# Patient Record
Sex: FEMALE | Race: WHITE
[De-identification: names, ages, dates, MRNs, and addresses within clinical notes are randomized per-mention and may not be internally consistent; named-entity substitution may affect disease eponyms.]

---

## 2018-04-04 NOTE — HP
Admitting History and Physical





- Primary Care Physician


PCP: Fred Wynn





- Admission


Chief Complaint: Left breast cancer with axillary node metastatic disease


History of Present Illness: 





42 year old premanapausal  female with strong family H/O breast and ovarian 

cancer. She had a H/O bilateral breast augmentations with implants originally 

placed  with exchange  due to ruptures. The patient noticed a density 

towards the lateral aspect of left breast around 2017. mammogram  and US 

showed suspicious left retroareolar calcifications and mutiple suspicious 

densities. She underwent left breast bx at 3:00 retroareolar region, 2:00 5 cm 

FN (intramammary LN) and axilla showing invasive ductal carcinoma  10/2017. , 

left breast 2:00and 9:00 bx  turned out to be benign.   Breast MRI showed 

exstensive enhancement left breast , right negative She is S/P neoadjuvant 

chemotherapy. 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  3/2018 repeat breast MRI 

showed right breast birad 3 residual  parenchymal enhancement, known residual 

enhancement left 3:00 retroareolar region birad 6 area of cancer , small 

residual enhancement 9:00 previously bx region, and decrease size of axillary 

node. 


History Source: Patient


Limitations to Obtaining History: No Limitations





- Past Medical History


Pulmonary: Yes: Asthma


Rheumatology: Yes: Rheumatoid Arthritis (?autoimmune dz)


Additional Past Medical History: 





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


Mansi dz





- Past Surgical History


Past Surgical History: Yes: Hernia Repair (umbilical hernia incarcerated at 15  

Bilateral breast augmentation  replaced  for ruptures bilateral 

myringotomies )





- Smoking History


Smoking history: Former smoker


Have you smoked in the past 12 months: Yes


Aproximately how many cigarettes per day: 1





- Alcohol/Substance Use


Hx Alcohol Use: Yes (socially)





Home Medications





- Allergies


Allergies/Adverse Reactions: 


 Allergies











Allergy/AdvReac Type Severity Reaction Status Date / Time


 


Penicillins Allergy   Verified 18 12:02


 


silver Allergy   Verified 18 12:03





[From Tegaderm AG Mesh]     














- Home Medications


Home Medications (free text): proair HFA inhaler





Family Disease History





- Family Disease History


Other Family History: mat 2nd cousin breast ca late 40's.  mat [place] ovarian ca.  

mat GF CRC skin ca.  mat [place] mouth and throat ca.  pat GF CRC.  mat [place] skin ca.  

mat  x4 esophageal ca.  pat [place] uterine ca cervical ca.  pat  lung ca





Physical Examination


Constitutional: Yes: Well Nourished


Breast(s): Yes: Other (obviosus bilateral augmentation with ptotic D cup breast 

, on palpitation no longer canc feel any suspicious densities in left breast or 

axilla, right breast negative)





Problem List





- Problems


(1) Breast cancer, left


Code(s): C50.912 - MALIGNANT NEOPLASM OF UNSPECIFIED SITE OF LEFT FEMALE BREAST

   


Qualifiers: 


   Breast location: upper outer quadrant of breast   Patient sex: female 





Assessment/Plan





Bilateral mastectomies with left sentenel node biopsy, lymphoscintogram 

posssible axillary node dissection, needle localization x2 of left axillary 

nodes with reconstruction

## 2018-04-10 ENCOUNTER — HOSPITAL ENCOUNTER (INPATIENT)
Dept: HOSPITAL 74 - FM/S | Age: 43
LOS: 2 days | Discharge: HOME | DRG: 580 | End: 2018-04-12
Attending: SURGERY | Admitting: SURGERY
Payer: COMMERCIAL

## 2018-04-10 VITALS — BODY MASS INDEX: 26.3 KG/M2

## 2018-04-10 DIAGNOSIS — C50.412: Primary | ICD-10-CM

## 2018-04-10 DIAGNOSIS — C77.3: ICD-10-CM

## 2018-04-10 DIAGNOSIS — Z17.0: ICD-10-CM

## 2018-04-10 PROCEDURE — 07B60ZX EXCISION OF LEFT AXILLARY LYMPHATIC, OPEN APPROACH, DIAGNOSTIC: ICD-10-PCS | Performed by: SURGERY

## 2018-04-10 PROCEDURE — 0KXF0Z2 TRANSFER RIGHT TRUNK MUSCLE WITH SKIN AND SUBCUTANEOUS TISSUE, OPEN APPROACH: ICD-10-PCS | Performed by: SURGERY

## 2018-04-10 PROCEDURE — A9541 TC99M SULFUR COLLOID: HCPCS

## 2018-04-10 PROCEDURE — 0HTV0ZZ RESECTION OF BILATERAL BREAST, OPEN APPROACH: ICD-10-PCS | Performed by: SURGERY

## 2018-04-10 PROCEDURE — 0HHV0NZ INSERTION OF TISSUE EXPANDER INTO BILATERAL BREAST, OPEN APPROACH: ICD-10-PCS | Performed by: SURGERY

## 2018-04-10 RX ADMIN — FAMOTIDINE SCH MG: 20 TABLET ORAL at 21:53

## 2018-04-10 RX ADMIN — PANTOPRAZOLE SODIUM SCH MG: 20 TABLET, DELAYED RELEASE ORAL at 21:53

## 2018-04-10 NOTE — OP
Operative Note





- Note:


Operative Date: 04/10/18


Pre-Operative Diagnosis: left breast cancer


Operation: bilateral mastectomy with left sentinel lymph node biopsy, bilateral 

breast reconstruction with alloderm/tissue expanders


Surgeon: Andres Mcgee


Assistant: Ila James


Anesthesiologist/CRNA: Rio Escobar


Anesthesia: General


Estimated Blood Loss (mls): 100


Drains & Tubes with Location: 19French blakmore drains


Fluid Volume Replaced (mls): 900


Operative Report Dictated: Yes

## 2018-04-11 LAB
DEPRECATED RDW RBC AUTO: 12.7 % (ref 11.6–15.6)
HCT VFR BLD CALC: 32.3 % (ref 32.4–45.2)
HGB BLD-MCNC: 10.9 GM/DL (ref 10.7–15.3)
MCH RBC QN AUTO: 35.1 PG (ref 25.7–33.7)
MCHC RBC AUTO-ENTMCNC: 33.9 G/DL (ref 32–36)
MCV RBC: 103.5 FL (ref 80–96)
PLATELET # BLD AUTO: 210 K/MM3 (ref 134–434)
PMV BLD: 7.3 FL (ref 7.5–11.1)
RBC # BLD AUTO: 3.12 M/MM3 (ref 3.6–5.2)
WBC # BLD AUTO: 4.9 K/MM3 (ref 4–10.8)

## 2018-04-11 RX ADMIN — FAMOTIDINE SCH: 20 TABLET ORAL at 21:57

## 2018-04-11 RX ADMIN — CLINDAMYCIN PHOSPHATE SCH MLS/HR: 300 INJECTION, SOLUTION INTRAVENOUS at 17:04

## 2018-04-11 RX ADMIN — DEXTROSE AND SODIUM CHLORIDE SCH: 5; 450 INJECTION, SOLUTION INTRAVENOUS at 08:26

## 2018-04-11 RX ADMIN — SODIUM CHLORIDE, POTASSIUM CHLORIDE, SODIUM LACTATE AND CALCIUM CHLORIDE SCH: 600; 310; 30; 20 INJECTION, SOLUTION INTRAVENOUS at 20:42

## 2018-04-11 RX ADMIN — ACETAMINOPHEN SCH MG: 325 TABLET ORAL at 03:12

## 2018-04-11 RX ADMIN — ACETAMINOPHEN SCH MG: 325 TABLET ORAL at 17:04

## 2018-04-11 RX ADMIN — FAMOTIDINE SCH MG: 20 TABLET ORAL at 09:10

## 2018-04-11 RX ADMIN — PANTOPRAZOLE SODIUM SCH MG: 20 TABLET, DELAYED RELEASE ORAL at 09:09

## 2018-04-11 RX ADMIN — ACETAMINOPHEN SCH MG: 325 TABLET ORAL at 05:23

## 2018-04-11 RX ADMIN — PANTOPRAZOLE SODIUM SCH: 20 TABLET, DELAYED RELEASE ORAL at 21:57

## 2018-04-11 RX ADMIN — HEPARIN SODIUM SCH UNIT: 5000 INJECTION, SOLUTION INTRAVENOUS; SUBCUTANEOUS at 07:52

## 2018-04-11 RX ADMIN — CLINDAMYCIN PHOSPHATE SCH MLS/HR: 300 INJECTION, SOLUTION INTRAVENOUS at 02:12

## 2018-04-11 RX ADMIN — SODIUM CHLORIDE, POTASSIUM CHLORIDE, SODIUM LACTATE AND CALCIUM CHLORIDE SCH: 600; 310; 30; 20 INJECTION, SOLUTION INTRAVENOUS at 08:26

## 2018-04-11 RX ADMIN — ACETAMINOPHEN SCH MG: 325 TABLET ORAL at 11:42

## 2018-04-11 RX ADMIN — CLINDAMYCIN PHOSPHATE SCH MLS/HR: 300 INJECTION, SOLUTION INTRAVENOUS at 09:09

## 2018-04-11 RX ADMIN — CLINDAMYCIN PHOSPHATE SCH: 300 INJECTION, SOLUTION INTRAVENOUS at 08:26

## 2018-04-11 RX ADMIN — ACETAMINOPHEN SCH: 325 TABLET ORAL at 08:26

## 2018-04-11 RX ADMIN — DEXTROSE AND SODIUM CHLORIDE SCH: 5; 450 INJECTION, SOLUTION INTRAVENOUS at 15:21

## 2018-04-11 RX ADMIN — HEPARIN SODIUM SCH UNIT: 5000 INJECTION, SOLUTION INTRAVENOUS; SUBCUTANEOUS at 20:49

## 2018-04-11 NOTE — SURG
Surgery First Assist Note


First Assist: Ila James PA-C


Date of Service: 04/10/18


Diagnosis: 





 left breast cancer





Procedure: 








 bilateral mastectomy with left sentinel lymph node biopsy, bilateral breast 

reconstruction with alloderm/tissue expander


I was present for the entirety of the operative procedure. For further detail, 

please refer to operative report.








Visit type





- Case Type


Case Type: Scheduled Admission





- Emergency


Emergency Visit: No





- New patient


This patient is new to me today: Yes


Date on this admission: 04/10/18

## 2018-04-11 NOTE — OP
DATE OF OPERATION:  04/10/2018 

 

PREOPERATIVE DIAGNOSIS:  Left breast multicentric breast cancer.

 

POSTOPERATIVE DIAGNOSIS:  Left breast multicentric breast cancer.

 

PROCEDURE PERFORMED:  Bilateral total mastectomies through a Wise reduction pattern

incision with left axillary sentinel lymph node biopsy and bilateral expander

reconstruction and AlloDerm. 

 

PRIMARY SURGEON:  Leno Harris M.D. 

 

FIRST ASSISTANT:  SYLVESTER Robin

 

Primary surgeon for the bilateral expander reconstruction and AlloDerm is Dr. Leno Mcgee, with his first assistant SYLVESTER Thorpe.    

 

ANESTHESIA:  General endotracheal anesthesia.

 

ESTIMATED BLOOD LOSS:  About 120 mL. 

 

COMPLICATIONS:  There were no complications. 

 

INDICATIONS:  Briefly, the patient is a 42-year-old , premenopausal white female

with a strong family history with her maternal great aunt who passed away from

ovarian cancer and her maternal 2nd cousin who had breast cancer in her late 40s. 

There is also other cancer in the family.  The patient was found to have suspicious

findings in the lateral left breast in 2017, and has a history of bilateral

augmentation implants.  There were multiple densities in the left breast at the 2 and

3 o'clock regions, as well as a suspicious left axillary lymph node.  The left breast

biopsies came back with invasive duct cancer, and she was found to have metastatic

lower left axillary lymph node and also an intramammary lymph node towards the upper

outer aspect of the left breast, which were both biopsied and positive for metastatic

disease.  MRI showed extensive cancer in the left breast, but the right breast was

negative.  The cancer was ER positive, AR negative and HER-II/ADRIA positive, and she

underwent neoadjuvant chemotherapy with TCHP, and followup MRI just showed some

minimal residual enhancement to the left breast 3 and 9 o'clock regions.  The patient

was advised on undergoing a mastectomy on the left side, and due to the proximity to

the nipple was advised to remove the nipple and decided to undergo prophylactic total

mastectomy on the right side at the same sitting.  She was advised of the need for a

sentinel lymph node biopsy and possible axillary lymph node dissection.  She was seen

by the plastic surgeon preoperatively, and decided to go forward with bilateral

expander reconstructions since she will need radiation on the left chest wall

postoperatively. 

 

DESCRIPTION OF PROCEDURE:  The patient was brought in for the procedure on April 10,

2018.  She underwent lymphoscintigraphy at [facility] prior to the

surgery, with periareolar injections of technetium-99.  She had needle localizations

of the intramammary and left lower axillary lymph node, which had been biopsied prior

to the chemotherapy.

 

She was then brought to the [facility] holding area, and in the holding area, site

verification was made and informed consent was obtained.  She was seen by Dr. Mcgee, who marked the patient preoperatively.  She underwent bilateral prepectoral

nerve blocks for postoperative pain control, and was brought into the operating room

and laid on the OR table in the supine position.  Venodynes were placed on the lower

extremities prior to induction.  She did receive 1 g of Ancef prior to incision, with

the KNOWN PENICILLIN ALLERGY, but had no reaction.  She was given general

endotracheal anesthesia.  Both breasts were sterilely prepped and draped in the usual

fashion.  Then 3 mL of Lymphazurin blue was injected intradermally, around the

periareolar region of the left breast nipple-areolar complex.

 

The sentinel lymph node biopsy was first performed.  We first remained the

intramammary lymph node with needle localization towards the upper outer aspect of

the left breast over the implant.  This was removed with the wire intact.  Specimen

radiograph showed removal of the clip in question, and frozen section of this came

back with just benign breast tissue with no daniel seen anymore.

 

An incision was made just below the hair-bearing area of the left axilla at this

point, and dissection was undertaken, and the wire was removed with the surrounding

lymph nodes, but specimen radiograph did not show any clip in that initial specimen. 

These had no significant radioactive count and were sent to Pathology as non-sentinel

lymph nodes, in formalin.

 

Using the Navigator probe, I was able to find 2 other hot nodes, labeled sentinel

lymph node number 2 and 3, with 10-second gamma counts of 2513 and 4111.  The 3rd

specimen did have the clip, which was seen within the node.  All 3 sentinel nodes

were sent for frozen section and came back negative.  No other blue or hot nodes were

found and background count was 46 after removal of these 3 sentinel nodes and the

non-sentinel lymph nodes.  

 

Hemostasis was achieved.  At this point the left mastectomy was performed through a

Wise-pattern reduction incision, removing the nipple-areolar complex.  Skin flaps

were raised superiorly to the level of the clavicle, medially to the level of the

sternum, laterally to the level of the latissimus and inferiorly below the level of

the inframammary fold.  The breast was taken off the pectoralis major muscle, after

removing the prior augmentation implant.  Electrocautery was used to remove the

breast off the muscle from inferomedial to superolateral.  It was completely removed

intact and oriented with a long-lateral and short-superior surgery.  Specimen

radiograph of the breast showed removal of the remaining clips in the left breast.  A

separate anterior margin was taken just on the upper skin flap and sent to Pathology

as anterior margin with suture marking the biopsy cavity side.  Hemostasis was

achieved and the wound was copiously irrigated with warm sterile saline.  The breast

was placed in formalin, after the specimen radiograph, to be sent to Pathology.

 

At this point, gloves, gowns and instruments were changed, and the right breast was

approached.  Again, using a Wise-pattern reduction mastopexy incision, the

nipple-areolar complex was completely removed and a skin flap was raised superiorly

to the level of the clavicle, medially to the level of the sternum, laterally to the

level of the latissimus and inferiorly below the level of the inframammary fold.  The

breast was taken down off the pectoralis major muscle, after removing the prior

augmentation implant.  The breast was taken down off the muscle from inferior-medial

to superior-lateral, using electrocautery.  The specimen with the breast and nipple

was removed and oriented with a long-lateral and short-superior suture, and weighed

to allow for an appropriate cosmetic result, placed in formalin and sent to Pathology

as "right total mastectomy."  Hemostasis was achieved and the wound was copiously

irrigated with warm sterile saline.   

 

At this point Dr. Mcgee became the primary surgeon for the bilateral augmentation

implants placed in the subpectoral location using AlloDerm to allow for ease in

expansion.  Two Buck drains were placed around each implant, one on each side, and

brought through separate stab incisions on the lateral skin flap and secured in place

using 3-0 nylon suture. 

 

The wounds were all closed by Plastic Surgery using interrupted 3-0 deep dermal PDS

suture and a running 4-0 subcuticular PDS suture.  The axillary wound was closed in a

similar fashion, and Dermabond was placed over the skin, since SHE HAD AN ADHESIVE

ALLERGY.  Compressive dressing and bra were placed over the patient.  We did use the

Spy skin perfusion device, which showed good bilateral skin perfusion after the

mastectomy as well as after the implant reconstruction.  

 

The patient was extubated at the end of the case and brought to the postanesthesia

care unit in stable condition.  She will be admitted postoperatively for pain

management and wound management.  

 

Estimated blood loss was about 120 mL, and she was hemodynamically stable throughout.

 

 

 

LENO HARRIS M.D.

 

[id number]

DD: 04/10/2018 20:28

DT: 2018 06:52

Job #:  [id number]

## 2018-04-11 NOTE — PN
Progress Note, Physician


Chief Complaint: 





Left breast cancer S/P neoadjuvant chemotherapy ,Bilateral totlal mastectomies 

left sentenel node biopsy expander and alloderm reconstruction POD #1


History of Present Illness: 





patient is  eating, no nausea or vomiting,pain managed with currrent protocol





- Current Medication List


Current Medications: 


Active Medications





Acetaminophen (Tylenol -)  650 mg PO Q6HPO [facility]


   Last Admin: 04/11/18 08:26 Dose:  Not Given


Diazepam (Valium -)  5 mg PO BID [facility]


   Last Admin: 04/10/18 21:54 Dose:  Not Given


Famotidine (Pepcid -)  20 mg PO BID [facility]


   Last Admin: 04/10/18 21:53 Dose:  20 mg


Heparin Sodium (Porcine) (Heparin -)  5,000 unit SQ BID@0800,2000 [facility]


   Last Admin: 04/11/18 07:52 Dose:  5,000 unit


Dextrose/Sodium Chloride (D5-1/2ns -)  1,000 mls @ 100 mls/hr IV ASDIR [facility]


   Last Admin: 04/11/18 08:26 Dose:  Not Given


Clindamycin Phosphate (Cleocin 300 Mg Premix Ivpb)  300 mg in 50 mls @ 100 mls/

hr IVPB Q8H-IV [facility]


   Last Admin: 04/11/18 08:26 Dose:  Not Given


Lactated Ringer's (Lactated Ringers Solution)  1,000 mls @ 75 mls/hr IV ASDIR 

[facility]


   Last Admin: 04/11/18 08:26 Dose:  Not Given


Ondansetron HCl (Zofran Injection)  4 mg IVPUSH Q6H PRN


   PRN Reason: NAUSEA AND/OR VOMITING


Oxycodone HCl (Roxicodone -)  5 mg PO Q4H PRN


   PRN Reason: PAIN LEVEL 1-5


   Last Admin: 04/11/18 07:47 Dose:  5 mg


Oxycodone HCl (Roxicodone -)  10 mg PO Q4H PRN


   PRN Reason: PAIN LEVEL 6-10


Pantoprazole Sodium (Protonix -)  20 mg PO BID [facility]


   Last Admin: 04/10/18 21:53 Dose:  20 mg


Tramadol HCl (Ultram -)  50 mg PO Q6H [facility]


   Last Admin: 04/11/18 08:25 Dose:  Not Given


Zolpidem Tartrate (Ambien -)  5 mg PO HS PRN


   PRN Reason: Insomnia











- Objective


Vital Signs: 


 Vital Signs











Temperature  98.3 F   04/11/18 04:00


 


Pulse Rate  57 L  04/11/18 04:00


 


Respiratory Rate  18   04/11/18 04:00


 


Blood Pressure  99/54   04/11/18 04:00


 


O2 Sat by Pulse Oximetry (%)  100   04/11/18 08:03











Constitutional: Yes: No Distress


Breast(s): Yes: Other (Flaps viable incision intact drains inplace and 

funtioning some echymosis bilaterally)





Problem List





- Problems


(1) Breast cancer, left


Code(s): C50.912 - MALIGNANT NEOPLASM OF UNSPECIFIED SITE OF LEFT FEMALE BREAST

   


Qualifiers: 


   Breast location: upper outer quadrant of breast   Patient sex: female 





Assessment/Plan





continue current pain management protocol


spirometry


OOb


SCD whiile in bed ,heparin SQ


IV antibiotics


Plan for discharge tomorrow


CBC pending

## 2018-04-11 NOTE — PN
Progress Note, Physician


Chief Complaint: 


Pt. pain controlled, no GA complaints.








- Current Medication List


Current Medications: 


Active Medications





Acetaminophen (Tylenol -)  650 mg PO Q6HPO [facility]


   Last Admin: 04/11/18 11:42 Dose:  650 mg


Diazepam (Valium -)  5 mg PO BID [facility]


   Last Admin: 04/11/18 09:08 Dose:  5 mg


Famotidine (Pepcid -)  20 mg PO BID [facility]


   Last Admin: 04/11/18 09:10 Dose:  20 mg


Heparin Sodium (Porcine) (Heparin -)  5,000 unit SQ BID@0800,2000 [facility]


   Last Admin: 04/11/18 07:52 Dose:  5,000 unit


Dextrose/Sodium Chloride (D5-1/2ns -)  1,000 mls @ 100 mls/hr IV ASDIR [facility]


   Last Admin: 04/11/18 08:26 Dose:  Not Given


Clindamycin Phosphate (Cleocin 300 Mg Premix Ivpb)  300 mg in 50 mls @ 100 mls/

hr IVPB Q8H-IV [facility]


   Last Admin: 04/11/18 09:09 Dose:  100 mls/hr


Lactated Ringer's (Lactated Ringers Solution)  1,000 mls @ 75 mls/hr IV ASDIR 

[facility]


   Last Admin: 04/11/18 08:26 Dose:  Not Given


Ondansetron HCl (Zofran Injection)  4 mg IVPUSH Q6H PRN


   PRN Reason: NAUSEA AND/OR VOMITING


Oxycodone HCl (Roxicodone -)  5 mg PO Q4H PRN


   PRN Reason: PAIN LEVEL 1-5


   Last Admin: 04/11/18 07:47 Dose:  5 mg


Oxycodone HCl (Roxicodone -)  10 mg PO Q4H PRN


   PRN Reason: PAIN LEVEL 6-10


   Last Admin: 04/11/18 11:46 Dose:  10 mg


Pantoprazole Sodium (Protonix -)  20 mg PO BID [facility]


   Last Admin: 04/11/18 09:09 Dose:  20 mg


Tramadol HCl (Ultram -)  50 mg PO Q6H [facility]


   Last Admin: 04/11/18 09:08 Dose:  50 mg


Zolpidem Tartrate (Ambien -)  5 mg PO HS PRN


   PRN Reason: Insomnia











- Objective


Vital Signs: 


 Vital Signs











Temperature  98.3 F   04/11/18 04:00


 


Pulse Rate  57 L  04/11/18 04:00


 


Respiratory Rate  18   04/11/18 04:00


 


Blood Pressure  99/54   04/11/18 04:00


 


O2 Sat by Pulse Oximetry (%)  100   04/11/18 08:03











Constitutional: Yes: Well Nourished, No Distress, Calm


Musculoskeletal: Yes: WNL


Neurological: Yes: WNL, Alert, Oriented


Labs: 


 CBC, BMP





 04/11/18 07:50 











Assessment/Plan


POD#1 s/p bilateral breast mastectomy under GA with PEC blocks. Doing well.


D/C from anesthesia care.

## 2018-04-12 VITALS — DIASTOLIC BLOOD PRESSURE: 55 MMHG | SYSTOLIC BLOOD PRESSURE: 91 MMHG | HEART RATE: 68 BPM | TEMPERATURE: 98.9 F

## 2018-04-12 RX ADMIN — FAMOTIDINE SCH MG: 20 TABLET ORAL at 09:27

## 2018-04-12 RX ADMIN — ACETAMINOPHEN SCH MG: 325 TABLET ORAL at 02:40

## 2018-04-12 RX ADMIN — ACETAMINOPHEN SCH MG: 325 TABLET ORAL at 06:32

## 2018-04-12 RX ADMIN — PANTOPRAZOLE SODIUM SCH MG: 20 TABLET, DELAYED RELEASE ORAL at 09:27

## 2018-04-12 RX ADMIN — CLINDAMYCIN PHOSPHATE SCH MLS/HR: 300 INJECTION, SOLUTION INTRAVENOUS at 02:42

## 2018-04-12 RX ADMIN — HEPARIN SODIUM SCH UNIT: 5000 INJECTION, SOLUTION INTRAVENOUS; SUBCUTANEOUS at 08:00

## 2018-04-12 RX ADMIN — CLINDAMYCIN PHOSPHATE SCH MLS/HR: 300 INJECTION, SOLUTION INTRAVENOUS at 09:27

## 2018-04-12 NOTE — PN
Progress Note, Physician


Chief Complaint: 





Left breast cancer S/P neoadjuvant chemotherapy and bilateral total 

mastectomies sentenel node biopsy reconstruction alloderm and expander POD#2


History of Present Illness: 





patient is using valium and oxycodone for pain eating no nausea OOb, ready for 

discharge today





- Current Medication List


Current Medications: 


Active Medications





Acetaminophen (Tylenol -)  650 mg PO Q6HPO [facility]


   Last Admin: 04/12/18 06:32 Dose:  650 mg


Diazepam (Valium -)  5 mg PO BID [facility]


   Last Admin: 04/12/18 09:27 Dose:  5 mg


Diphenhydramine HCl (Benadryl -)  25 mg PO HS [facility]


   Last Admin: 04/11/18 21:55 Dose:  25 mg


Famotidine (Pepcid -)  20 mg PO BID [facility]


   Last Admin: 04/12/18 09:27 Dose:  20 mg


Heparin Sodium (Porcine) (Heparin -)  5,000 unit SQ BID@0800,2000 [facility]


   Last Admin: 04/11/18 20:49 Dose:  5,000 unit


Dextrose/Sodium Chloride (D5-1/2ns -)  1,000 mls @ 100 mls/hr IV ASDIR [facility]


   Last Admin: 04/11/18 15:21 Dose:  Not Given


Clindamycin Phosphate (Cleocin 300 Mg Premix Ivpb)  300 mg in 50 mls @ 100 mls/

hr IVPB Q8H-IV [facility]


   Last Admin: 04/12/18 09:27 Dose:  100 mls/hr


Lactated Ringer's (Lactated Ringers Solution)  1,000 mls @ 75 mls/hr IV ASDIR 

[facility]


   Last Admin: 04/11/18 20:42 Dose:  Not Given


Ondansetron HCl (Zofran Injection)  4 mg IVPUSH Q6H PRN


   PRN Reason: NAUSEA AND/OR VOMITING


Oxycodone HCl (Roxicodone -)  5 mg PO Q4H PRN


   PRN Reason: PAIN LEVEL 1-5


   Last Admin: 04/11/18 07:47 Dose:  5 mg


Oxycodone HCl (Roxicodone -)  10 mg PO Q4H PRN


   PRN Reason: PAIN LEVEL 6-10


   Last Admin: 04/12/18 06:32 Dose:  10 mg


Pantoprazole Sodium (Protonix -)  20 mg PO BID [facility]


   Last Admin: 04/12/18 09:27 Dose:  20 mg


Tramadol HCl (Ultram -)  50 mg PO Q6H [facility]


   Last Admin: 04/12/18 09:26 Dose:  50 mg


Zolpidem Tartrate (Ambien -)  5 mg PO HS PRN


   PRN Reason: Insomnia











- Objective


Vital Signs: 


 Vital Signs











Temperature  98.9 F   04/12/18 06:00


 


Pulse Rate  68   04/12/18 06:00


 


Respiratory Rate  18   04/12/18 06:00


 


Blood Pressure  91/55   04/12/18 06:00


 


O2 Sat by Pulse Oximetry (%)  97   04/12/18 06:00











Constitutional: Yes: Well Nourished


Breast(s): Yes: Other (flaps viable bilaterally incision intact,FELIPE drains 

functioning dressing changed no infection or drainage some echymosis bilaterally

)


Labs: 


 CBC, BMP





 04/11/18 07:50 











Problem List





- Problems


(1) Breast cancer, left


Code(s): C50.912 - MALIGNANT NEOPLASM OF UNSPECIFIED SITE OF LEFT FEMALE BREAST

   


Qualifiers: 


   Breast location: upper outer quadrant of breast   Patient sex: female 





Assessment/Plan





patient ready for discharge today


follow up next week with Dr cMgee and Dr Kingsley dunne showpeggy


cipro BID


VNS

## 2018-04-12 NOTE — DS
Physical Examination


Vital Signs: 


 Vital Signs











Temperature  98.9 F   04/12/18 06:00


 


Pulse Rate  68   04/12/18 06:00


 


Respiratory Rate  18   04/12/18 06:00


 


Blood Pressure  91/55   04/12/18 06:00


 


O2 Sat by Pulse Oximetry (%)  97   04/12/18 06:00











Constitutional: Yes: No Distress


Breast(s): Yes: Other (bilateral flaps viable incision intact marshal drains 

functioning no infection or drainage)


Labs: 


 CBC, BMP





 04/11/18 07:50 











Discharge Summary


Reason For Visit: LEFT BREAST CA


Condition: Good





- Instructions


Diet, Activity, Other Instructions: 


Post Operative Instructions - [facility]





We hope your recovery will be uneventful. For those of you who have been given


general anesthesia, there is a possibility you might have some lightheadedness


and possibly nausea.





It is important that each patient, especially those who have had general 


anesthesia, follow these instructions, please:





1. Do NOT operate a motor vehicle for 24 hours.


2. Do NOT drink any alcoholic beverages for 24 hours.


3. Do NOT take any sedatives, narcotics, or tranquilizers for 24 hours


    unless specifically ordered by your surgeon.


4. Do NOT undertake any strenuous exercise or outside activity for 24 hours 


    unless specifically permitted by your surgeon.


5. Eat light foods that are easy to digest. If you have any problems with nausea


   and vomiting, lie down and rest. If it continues, call your surgeon.


6. Call your surgeon AT ONCE if you have problems with:


   a. Bleeding


   b. Urinating


   c. Excessive pain or drainage


   d. Numbness


      


If any problems occur, call your physician first. If you cannot reach him/her, 

call


the Ambulatory Surgery Unit at [phone], or the Emergency Room at [phone].


Follow up with Lexie Wynn / Carly in 7 days.





Medication: Vicodin E-S OR Percocet 1-2 tablets every 4-6 hrs as needed for 5-7 

days.





Wound Care: Keep wound dry and clean for 48 hours. You may remove the dressing 

after 


                  48 hours and may shower. Keep steri-strips in place until 

follow-up appointment


                  No heavy lifting or strenuous activities.                    

                                  BREAST SURGERY INSTRUCTIONS





Isidro Wynn M.D., KEYSHAWN Wynn M.D., FACS         Naye Carroll M.D., FACS





1. Please call the office at [phone] to make a follow up appointment 

with your surgeon.


    This number can be also used for any urgent issues you may have.





2. Call us immediately if any of the following occur:


   *Bleeding from the incision or drain site (a small amount is normal)


   *Fever or chills


   *Redness and worsening tenderness around the surgical site


   *Drainage of pus or fluid from the incision or drain site





3. You may change the surgical dressing two (2) days after your surgery, and 

may shower then.


    If you have drains, you may shower after they have been removed, until then 

take a sponge


    bath.





4. It is normal for there to be some bruising and tenderness around the 

surgical site, and the 


    breast may also be firm in this area.





5. Please wear a comfortable bra (sports or surgical bra) all day and all night 

until your first 


    follow-up visit with your surgeon.





6. The pain medicine you have been prescribed may make you constipated; make 

sure you


    drink plenty of water. You may use an over the counter laxative if needed.





7. You may resume your normal diet after surgery, although you may want to 

avoid rich foods


    for the first twenty-four (24) hours after surgery. Alcoholic drinks should 

be avoided while 


    taking the prescribed pain medicine.





8. You may resume normal activities as long as there is no discomfort, but do 

not do upper body


    exercises until after your follow-up appointment. Do not lift anything 

heavier than a large 


    phone book. You may resume driving once you have stopped taking the 

prescribed pain medicine


    and feel comfortable doing arm movements. NO SHOWER,WEAR BRA, EMPTY AND 

RECORD MARSHAL OUTPUT TWICE DAILY


Referrals: 


Fred Wynn MD [Staff Physician] - 


Andres Mcgee MD [Staff Physician] - 


Disposition: HOME





- Home Medications


Comprehensive Discharge Medication List: 


Ambulatory Orders





Acetaminophen [Tylenol] 650 mg PO ASDIR PRN 04/05/18 


Diphenhydramine [Benadryl Capsule -] 50 mg PO HS 04/05/18 


Famotidine [Pepcid] 20 mg PO BID 04/05/18 


Lansoprazole [Prevacid] 15 mg PO BID 04/05/18 


Ciprofloxacin HCl [Cipro] 500 mg PO BID #20 tablet 04/12/18 


Diazepam [Valium] 5 mg PO Q8H PRN #30 tablet MDD 3 04/12/18 


Oxycodone HCl 5 mg PO Q4HWA PRN #30 tablet MDD 6 04/12/18

## 2018-04-13 NOTE — PATH
Surgical Pathology Report



Patient Name:  MICHAEL MATIAS

Accession #:  

Med. Rec. #:  [id number]                                                        

   /Age/Gender:  1975 (Age: 42) / F

Account:  [id number]                                                          

             Location: [facility] MED-SURG

Taken:  4/10/2018

Received:  4/10/2018

Reported:  2018

Physicians:  Fred Wynn M.D.

  



Specimen(s) Received

A: LEFT BREAST SENTINAL NODE #1(299 COUNT) 

B: LEFT BREAST SENTINAL NODE #2 (2513 COUNT) 

C: LEFT BREAST SENTINAL NODE #3 (4117 count) 

D: LEFT BREAST NON SENTINAL NODES 

E: LEFT BREAST ANTERIORIOR MARGIN 

F: LEFT BREAST SUTURE PERALES 

G: RIGHT BREAST SUTURE PERALES 

H: BILATERAL BREAST IMPLANTS 





Clinical History

Status post neoadjuvant chemotherapy, known positive lymph node

pre-chemotherapy.





Intraoperative Consult Diagnosis

A. Left breast sentinel node #1, frozen section and touch prep: Benign breast

tissue. No lymph node identified on representative frozen section or touch prep.

STEVE Morrison M.D., 4/10/2018



B. Left breast sentinel node #2, touch prep: Benign lymph node.

STEVE Morrison M.D., 4/10/2018



C. Left breast sentinel node #3, touch prep: Benign lymph node.

STEVE Morrison M.D., 4/10/2018











Final Diagnosis

A. LYMPH NODE, LEFT AXILLARY SENTINEL #1 EXCISION:

ONE BENIGN INTRAMAMMARY LYMPH NODE (0/1) BY STANDARD HEMATOXYLIN AND EOSIN STAIN

(MULTIPLE LEVELS EXAMINED) AND AE1/3 IMMUNOSTAIN.

AREA OF SCLEROSIS SUGGESTIVE OF PRIOR BIOPSY SITE OR RESOLVED TUMOR BED PRESENT.

REMAINING BREAST TISSUE WITH FIBROCYSTIC CHANGES AND INCLUDES THE CHANGE,

STROMAL FIBROSIS, DUCTAL DILATATION, AND CYSTIC APOCRINE METAPLASIA.



B. LYMPH NODE, LEFT AXILLARY SENTINEL NODE #2, EXCISION:

ONE BENIGN LYMPH NODE (0/1) BY STANDARD HEMATOXYLIN AND EOSIN STAIN (MULTIPLE

LEVELS EXAMINED) AND AE1/3 IMMUNOSTAIN.



C. LYMPH NODE, LEFT AXILLARY SENTINEL NODE #3, EXCISION:

ONE BENIGN LYMPH NODE (0/1) BY STANDARD HEMATOXYLIN AND EOSIN STAIN (MULTIPLE

LEVELS EXAMINED) AND AE1/3 IMMUNOSTAIN.



D. LYMPH NODES, LEFT AXILLARY, EXCISION:

NO METASTATIC CARCINOMA IDENTIFIED IN 12 LYMPH NODES (0/12).



E. LEFT BREAST, ANTERIOR MARGIN, EXCISION:

BENIGN FIBROFATTY TISSUE.



F. LEFT BREAST, MASTECTOMY:

BENIGN BREAST TISSUE WITH MULTIPLE FIBROADENOMATA, ALONG WITH FIBROCYSTIC

CHANGES INCLUDING USUAL DUCTAL HYPERPLASIA, STROMAL FIBROSIS, DUCTAL DILATATION,

AND CYSTIC AND CYSTIC APOCRINE METAPLASIA.

FIBROUS TISSUE CONSISTENT WITH BREAST IMPLANT CAPSULE PRESENT.

NO RESIDUAL CARCINOMA IDENTIFIED.



G. RIGHT BREAST, MASTECTOMY:

SCLEROSING ADENOSIS WITH ASSOCIATED CAUSE PATIENT, ARISING IN A BACKGROUND OF

FIBROCYSTIC CHANGES INCLUDING COLUMNAR CELL CHANGE, FIBROADENOMATOID CHANGE,

STROMAL FIBROSIS, DUCTAL DILATATION, AND ASSOCIATED CALCIFICATION.

FIBROUS TISSUE CONSISTENT WITH BREAST IMPLANT CAPSULE PRESENT.



H. MEDICAL DEVICE, BILATERAL BREAST, REMOVAL:

TWO BREAST IMPLANTS (GROSS ONLY).



Comment: Also see Slide Review .





Comments

Breast Invasive Carcinoma: Surgical Pathology Cancer Case Summary

Based on AJCC/UICC TNM, 7th edition



Procedure 

_X__ Total mastectomy (including nipple and skin)



Lymph Node Sampling (select all that apply) (required only if lymph nodes are

present in the specimen)

_X__ [place] lymph node(s) 

_X__ Axillary dissection (partial or complete dissection) 

_X__ Lymph nodes present within the breast specimen (ie, intramammary lymph

nodes)



Specimen Laterality

_X__ Left



Tumor Size: Size of Largest Invasive Carcinoma 

_X__ No residual invasive carcinoma 



Tumor Focality 

_X__ No residual invasive carcinoma 



Macroscopic and Microscopic Extent of Tumor 



Skin

     _X__ Invasive carcinoma does not invade into the dermis or epidermis



Nipple

     _X__ DCIS does not involve the nipple epidermis



Ductal Carcinoma In Situ (DCIS) 

_X__ No DCIS is present 



Histologic Type of Invasive Carcinoma : 

_X__ Invasive carcinoma of no special type (ductal, not otherwise specified)



Histologic Grade: ([place] Histologic Score) 

Tubular Differentiation

_X__ No residual invasive carcinoma 



Nuclear Pleomorphism

_X__ No residual invasive carcinoma 



Mitotic Rate

_X__ No residual invasive carcinoma 



Overall Grade

_X__ No residual invasive carcinoma after presurgical (neoadjuvant) therapy



Margins 

_X__ Margins uninvolved by invasive carcinoma (required only if residual

invasive carcinoma is present in specimen)

     Distance from closest margin: Not applicable (No residual carcinoma)



_X__ Margins uninvolved by DCIS (required only if residual DCIS is present in

specimen) 

     Distance from closest margin: Not applicable (No residual carcinoma)



Lymph-Vascular Invasion 

_X__ Not identified



Lymph Nodes 

Total number of lymph nodes examined (sentinel and nonsentinel): 15

Number of sentinel lymph nodes examined: 3

Number of lymph nodes with macrometastases ( > 2 mm):  0

     Number of lymph nodes with micrometastases (>0.2 mm to 2 mm and/or

>200cells):0

Number of lymph nodes with isolated tumor cells (=0.2 mm and =200 cells): 0



Extranodal Extension

_X__ Not applicable



Pathologic Staging (pTNM) 

Primary Tumor (Invasive Carcinoma): ypT0

Regional Lymph Nodes (pN): ypN0

     

Biomarker Studies 

Results of ER and OK studies performed on a prior biopsy (Slide Review )

[facility] [place], [place] are as follows:

ER (clone [id number]): 80-90% nuclear staining with moderate intensity (Positive).

OK ([id number]) :   up to 25% nuclear staining weak to moderate intensity

(Positive).

Her2 IHC (EP3 from [employer], formerly known as [id number]): 3+ Positive

Positive and negative controls (internal if applicable) show appropriate

results.

Formalin fixation and cold ischemic times are within current ASCO/CAP

recommendations for ER, OK and Her2 testing.



***Electronically Signed***

Darnell Morrison M.D.





Gross Description

A. Received fresh labeled "left breast sentinel node #1" is a 3.5 x 2.7 x 0.9 cm

portion of yellow and grey fibrofatty tissue with blue discoloration. The

specimen is accompanied by a localizing wire. Cut surface reveals yellow and

grey tissue with no distinct lymph node identified. Touch preps are prepared. A

representative portion is frozen and frozen the remainder is submitted in

cassette FSA1. All of the remaining tissue is submitted in cassettes 2-5.



B. Received fresh labeled "left breast sentinel node #2" is a 2.8 x 2.8 x 1.2 cm

portion of yellow fatty tissue containing a 1.2 cm greatest dimension lymph node

with a fatty hilum. Cut surface reveals no focal lesions. Touch imprints are

prepared. The lymph node is sectioned and totally smooth in cassette B.



C. Received fresh labeled "left breast sentinel node #3" is a 1.5 x 1.1 x 0.8 cm

portion of fatty tissue containing a 0.8 cm in greatest dimension ovoid lymph

node. Cut surface reveals a uniform surface with no lesion identified. Touch

preps are prepared. The specimen is sectioned and totally submitted in a

cassette C.



D. Received in formalin labeled "left breast non-sentinel nodes" is a 6 x 3.5 x

2.5 cm aggregate of yellow fatty tissue with a localizing wire in place.

Multiple possible lymph nodes are noted. All of the possible lymph nodes are

submitted as follows: 1 through 4-one sectioned lymph node each cassette; 5

through 8-multiple lymph nodes each cassette.



E.  Received in formalin labelled "left breast anterior margin" is a 4.5 x 3.3 x

0.9 cm portion of yellow fibrofatty tissue with a suture marking one aspect

indicated to be the biopsy cavity side. The opposite margin is inked blue.

Sectioned and totally submitted in 6 cassettes.



F.  Received in formalin, labeled "left breast mastectomy," is a 409 gram, 18 x

22 x 4 cm. left mastectomy specimen with a short suture marking the superior

aspect and a long suture marking the lateral aspect of the specimen, per the

surgeon. There is a 12 x 10 cm triangular shaped portion of skin present which

includes the nipple and areola at its apex. The areola shows blue discoloration.

There is attached fibromembranous tissue at the inferior aspect of the deep

margin suggestive of a breast implant capsule. The deep margin is inked black

and the anterior soft tissue margin is inked blue. The specimen is serially

sectioned from superior to inferior. Sectioning reveals yellow adipose tissue

and gray fibrous tissue. A 0.8 cm in greatest dimension apparent fibroadenoma is

present in the superior aspect of the breast. Elsewhere, vaguely nodular tissue

is present but no grossly obvious invasive carcinoma is identified. Ectatic

ducts are note. In area with a biopsy clip is identified in the retroareolar

region. Representative sections are submitted in 12 cassettes as follows: 1

through 3-nipple and retroareolar tissue; 4-retroareolar tissue with biopsy

clip; 5 through 8-additional periareolar tissue; 9 and 10-fibroadenoma; 11-deep

margin with fibromembranous tissue; 12-deep margin towards lateral aspect with

ectatic duct material.



G.  Received in formalin, labeled "right breast mastectomy," is a 307 gram, 15 x

14.5 x 3.5 cm. right mastectomy specimen with a short suture marking the

superior aspect and a long suture marking the lateral aspect of the specimen,

per the surgeon. There is an 11 x 9.5 cm ellipse of skin with deep nipple and

areola at its apex present. Fibromembranous tissue is present at the inferior

aspect of the deep margin. The deep margin is inked black and the anterior soft

tissue margin is inked blue. An additional 9 x 3.5 x 1.3 cm portion of yellow

fatty tissue is present within the specimen container The specimen is serially

sectioned from superior to inferior. Sectioning reveals gray fibrous tissue and

yellow adipose tissue. Representative sections are submitted in 10 cassettes as

follows: 1 and 2-nipple; 3-upper outer quadrant; 4-lower outer quadrant; 5-upper

inner quadrant; 6-lower inner quadrant; 7 through 9-central breast tissue;

10-additional tissue from specimen container.



H. Received dry labeled "bilateral breast implants" are 2 gel filled plastic

sacs consistent with breast implants. Each of these is designated [place] 380 cc.

This is for gross identification only.

 

[facility]/4/10/2018



[facility]/4/10/2018

## 2018-06-18 ENCOUNTER — HOSPITAL ENCOUNTER (INPATIENT)
Dept: HOSPITAL 74 - FM/S | Age: 43
LOS: 3 days | Discharge: HOME | DRG: 920 | End: 2018-06-21
Attending: SURGERY | Admitting: SURGERY
Payer: COMMERCIAL

## 2018-06-18 VITALS — BODY MASS INDEX: 26.3 KG/M2

## 2018-06-18 DIAGNOSIS — Z80.7: ICD-10-CM

## 2018-06-18 DIAGNOSIS — Z85.3: ICD-10-CM

## 2018-06-18 DIAGNOSIS — Z87.891: ICD-10-CM

## 2018-06-18 DIAGNOSIS — Z80.3: ICD-10-CM

## 2018-06-18 DIAGNOSIS — C79.89: ICD-10-CM

## 2018-06-18 DIAGNOSIS — J45.909: ICD-10-CM

## 2018-06-18 DIAGNOSIS — Z98.890: ICD-10-CM

## 2018-06-18 DIAGNOSIS — M06.9: ICD-10-CM

## 2018-06-18 DIAGNOSIS — Z88.0: ICD-10-CM

## 2018-06-18 DIAGNOSIS — E80.4: ICD-10-CM

## 2018-06-18 DIAGNOSIS — L76.82: Primary | ICD-10-CM
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
## 2018-06-18 DIAGNOSIS — Q92.9: ICD-10-CM

## 2018-06-18 DIAGNOSIS — C50.412: ICD-10-CM

## 2018-06-18 DIAGNOSIS — N61.0: ICD-10-CM

## 2018-06-18 LAB
ALBUMIN SERPL-MCNC: 4 G/DL (ref 3.5–5)
ALP SERPL-CCNC: 58 U/L (ref 32–92)
ALT SERPL-CCNC: 14 U/L (ref 10–40)
ANION GAP SERPL CALC-SCNC: 7 MMOL/L (ref 8–16)
AST SERPL-CCNC: 15 U/L (ref 10–42)
BASOPHILS # BLD: 0.6 % (ref 0–2)
BILIRUB SERPL-MCNC: 0.6 MG/DL (ref 0.2–1)
BUN SERPL-MCNC: 17 MG/DL (ref 7–18)
CALCIUM SERPL-MCNC: 9.2 MG/DL (ref 8.4–10.2)
CHLORIDE SERPL-SCNC: 104 MMOL/L (ref 98–107)
CO2 SERPL-SCNC: 29 MMOL/L (ref 22–28)
CREAT SERPL-MCNC: 0.8 MG/DL (ref 0.6–1.3)
DEPRECATED RDW RBC AUTO: 11.8 % (ref 11.6–15.6)
EOSINOPHIL # BLD: 5.9 % (ref 0–4.5)
GLUCOSE SERPL-MCNC: 116 MG/DL (ref 74–106)
HCT VFR BLD CALC: 34.6 % (ref 32.4–45.2)
HGB BLD-MCNC: 11.7 GM/DL (ref 10.7–15.3)
LYMPHOCYTES # BLD: 46.2 % (ref 8–40)
MCH RBC QN AUTO: 31.9 PG (ref 25.7–33.7)
MCHC RBC AUTO-ENTMCNC: 33.7 G/DL (ref 32–36)
MCV RBC: 94.6 FL (ref 80–96)
MONOCYTES # BLD AUTO: 6.8 % (ref 3.8–10.2)
NEUTROPHILS # BLD: 40.5 % (ref 42.8–82.8)
PLATELET # BLD AUTO: 221 K/MM3 (ref 134–434)
PMV BLD: 7.7 FL (ref 7.5–11.1)
POTASSIUM SERPLBLD-SCNC: 3.5 MMOL/L (ref 3.5–5.1)
PROT SERPL-MCNC: 6.6 G/DL (ref 6.4–8.3)
RBC # BLD AUTO: 3.66 M/MM3 (ref 3.6–5.2)
SODIUM SERPL-SCNC: 140 MMOL/L (ref 136–145)
WBC # BLD AUTO: 3.9 K/MM3 (ref 4–10.8)

## 2018-06-18 NOTE — PN
Progress Note (short form)





- Note


Progress Note: 


ID Consult dictated


Cellulitis 


PCN allergy


"Red man syndrome" secondary to Vancomycin


Benadryl ordered    Head and neck rash improving


Continue vancomycin with benadryl pre-medication

## 2018-06-18 NOTE — HP
Admitting History and Physical





- Primary Care Physician


PCP: Fred Wynn





- Admission


Chief Complaint: Left breast cancer S/P bilateral mastectomies left breast  

revision now with bilateral  cellulitis


History of Present Illness: 





42 year old  premenapuausal female S/P bilateral  mastectomies  4/10/2018 

and Bilateral expanders for  Left breast invasive ductal carcinoma with 

axillary metastatic dz. She is currently on projetta and herceptin. She 

required a small wound revision left breast  by Dr Espitia on . . 

Sutures were removed 2018 and at that time there was right breast redness 

and she was started on levaquin for a cellulitis.  On 6/15/2018  when she 

returned to plastics for suture removal left breast redness/ cellulitis was 

noted with petichiae and  levaquin was changed to  clindamycin.  She reports 

some night sweats but no fever.  She saw Dr Wynn in office today and was 

admitted for left breast cellulitis and petichia. Additionally she was 

receiving Physical therapy for left arm banding and paresthesia which has shown 

some improvement with the PT. She does have a history of bilateral augmentation 

with implants  and mutiplle ruptures and replacements with last exchange 

 prior to her cancer surgery. 


History Source: Patient


Limitations to Obtaining History: No Limitations





- Past Medical History


Pulmonary: Yes: Asthma


Rheumatology: Yes: Rheumatoid Arthritis (?autoimmune dz)


Additional Past Medical History: 





chromosone duplication syndrome zakia zdz





- Past Surgical History


Past Surgical History: Yes: Hernia Repair (umbilical hernia incarcerated at 15  

Bilateral breast augmentation  replaced  for ruptures bilateral 

myringotomies ), Mastectomy (Bilateral mastectomies  left breast cancer)


Additional Past Surgical History: 





Bilateral augmentation  requiring mutiple replacements for ruptures last 

exchange being . 





- Smoking History


Smoking history: Former smoker


Have you smoked in the past 12 months: Yes


Aproximately how many cigarettes per day: 1


If you are a former smoker, when did you quit?: 10/17





- Alcohol/Substance Use


Hx Alcohol Use: Yes (socially)





Home Medications





- Allergies


Allergies/Adverse Reactions: 


 Allergies











Allergy/AdvReac Type Severity Reaction Status Date / Time


 


adhesive Allergy Severe BLISTERS,HI Verified 18 09:59





   VES,ITCHING  


 


latex Allergy Severe Itching,IRR Verified 18 09:57





   ITATED  


 


mold Allergy Severe THROAT Verified 18 09:57





   SWELLING/ITCHING  


 


Penicillins Allergy Severe THROAT Verified 18 09:56





   SWELLING/ITCHING  


 


CHEESE AdvReac   Uncoded 18 13:26


 


TEGADERM AdvReac   Uncoded 18 13:26














- Home Medications


Home Medications: 


Ambulatory Orders





Diphenhydramine [Benadryl Capsule -] 50 mg PO HS 18 


Famotidine [Pepcid] 20 mg PO BID 18 


Loperamide HCl [Imodium -] 2 mg PO Q8H 18 


Ondansetron HCl [Zofran] 4 mg PRN 18 











Family Disease History





- Family Disease History


Family Disease History: CA: Mother (breast ca 55), Brother (CLL), Sister (

breast ca 45)


Other Family History: nephew  lymphoma





Physical Examination


Constitutional: Yes: No Distress


Breast(s): Yes: Other (Bilateral Bilateral wise pattern reduction incisions 

with expanders and steristrips in place left due to small revision. Redness 

inferior aspect left breast with some petichiae axillary banding left . Right 

breast minimal resolvng erythema lowere inner aspect.)





Problem List





- Problems


(1) Cellulitis of left breast


Code(s): N61.0 - MASTITIS WITHOUT ABSCESS   





Assessment/Plan





CBC with diff, chemistry


Infectious disease consult


Vancomycin 1 gram  x once


dilaudid 2mg prn


bacid probiotic


immodium prn


plastic consult


Bilateral breast US

## 2018-06-19 LAB
ANION GAP SERPL CALC-SCNC: 3 MMOL/L (ref 8–16)
BASOPHILS # BLD: 0.6 % (ref 0–2)
BUN SERPL-MCNC: 16 MG/DL (ref 7–18)
CALCIUM SERPL-MCNC: 9 MG/DL (ref 8.4–10.2)
CHLORIDE SERPL-SCNC: 106 MMOL/L (ref 98–107)
CO2 SERPL-SCNC: 28 MMOL/L (ref 22–28)
CREAT SERPL-MCNC: 0.8 MG/DL (ref 0.6–1.3)
DEPRECATED RDW RBC AUTO: 12.2 % (ref 11.6–15.6)
EOSINOPHIL # BLD: 6.6 % (ref 0–4.5)
GLUCOSE SERPL-MCNC: 101 MG/DL (ref 74–106)
HCT VFR BLD CALC: 34.8 % (ref 32.4–45.2)
HGB BLD-MCNC: 12.1 GM/DL (ref 10.7–15.3)
LYMPHOCYTES # BLD: 39.4 % (ref 8–40)
MCH RBC QN AUTO: 33 PG (ref 25.7–33.7)
MCHC RBC AUTO-ENTMCNC: 34.9 G/DL (ref 32–36)
MCV RBC: 94.6 FL (ref 80–96)
MONOCYTES # BLD AUTO: 7.8 % (ref 3.8–10.2)
NEUTROPHILS # BLD: 45.6 % (ref 42.8–82.8)
PLATELET # BLD AUTO: 207 K/MM3 (ref 134–434)
PMV BLD: 7.3 FL (ref 7.5–11.1)
POTASSIUM SERPLBLD-SCNC: 4 MMOL/L (ref 3.5–5.1)
RBC # BLD AUTO: 3.68 M/MM3 (ref 3.6–5.2)
SODIUM SERPL-SCNC: 137 MMOL/L (ref 136–145)
WBC # BLD AUTO: 3.9 K/MM3 (ref 4–10.8)

## 2018-06-19 RX ADMIN — DIPHENHYDRAMINE HCL SCH MG: 25 CAPSULE ORAL at 02:21

## 2018-06-19 RX ADMIN — DIPHENHYDRAMINE HCL SCH MG: 25 CAPSULE ORAL at 14:02

## 2018-06-19 RX ADMIN — VANCOMYCIN HYDROCHLORIDE SCH MLS/HR: 1 INJECTION, POWDER, LYOPHILIZED, FOR SOLUTION INTRAVENOUS at 03:02

## 2018-06-19 RX ADMIN — VANCOMYCIN HYDROCHLORIDE SCH MLS/HR: 1 INJECTION, POWDER, LYOPHILIZED, FOR SOLUTION INTRAVENOUS at 14:05

## 2018-06-19 RX ADMIN — Medication SCH TAB: at 09:34

## 2018-06-19 NOTE — PN
Progress Note, Physician


History of Present Illness: 





No c/o breast pain


No c/o fever/ chills


Tolerated second dose of vancomycin 


  after benadryl premedication





- Current Medication List


Current Medications: 


Active Medications





Diphenhydramine HCl (Benadryl -)  25 mg PO BID@0200,1400 [facility]


   Last Admin: 06/19/18 02:21 Dose:  25 mg


Hydromorphone HCl (Dilaudid -)  2 mg PO Q6H PRN


   PRN Reason: PAIN LEVEL 1-5


Vancomycin HCl (Vancomycin (Pre-Docked))  1,000 mg in 250 mls @ 166.667 mls/hr 

IVPB BID@0230,1430 MELISSA; Protocol


   Last Admin: 06/19/18 03:02 Dose:  166.667 mls/hr


Lactobacillus Acidophilus (Bacid -)  1 tab PO DAILY [facility]


   Last Admin: 06/19/18 09:34 Dose:  1 tab


Loperamide HCl (Imodium -)  2 mg PO Q8H PRN


   PRN Reason: DIARRHEA


Ondansetron HCl (Zofran -)  4 mg PO Q8H PRN


   PRN Reason: NAUSEA AND/OR VOMITING











- Objective


Vital Signs: 


 Vital Signs











Temperature  97.8 F   06/19/18 06:00


 


Pulse Rate  65   06/19/18 06:00


 


Respiratory Rate  16   06/19/18 08:09


 


Blood Pressure  109/73   06/19/18 06:00


 


O2 Sat by Pulse Oximetry (%)  98   06/19/18 08:09











Constitutional: Yes: No Distress


Eyes: Yes: Conjunctiva Clear


Cardiovascular: Yes: Regular Rate and Rhythm, S1, S2


Respiratory: Yes: CTA Bilaterally


Gastrointestinal: Yes: Normal Bowel Sounds, Soft.  No: Tenderness


Breast(s): Yes: Other (small residual  area of erythema L  breast outer , lower 

quadrant)


Labs: 


 CBC, BMP





 06/18/18 12:58 





 06/18/18 12:58 











Assessment/Plan





Breast cellulitis


PCN allergy


S/P " red man syndrome" secondary to vancomycin





Await c/s


Continue vancomycin with benadryl premedication

## 2018-06-19 NOTE — CONS
INFECTIOUS DISEASE CONSULTATION

 

DATE OF CONSULTATION:

 

DATE OF DICTATION:  06/19/2018

 

The patient is a 42-year-old female evaluated for breast cellulitis.

 

Patient was diagnosed with left breast invasive ductal carcinoma with axillary lymph

node involvement.  She underwent a bilateral mastectomy on April 10, 2018, with

placement of bilateral tissue expanders.

 

She reports that a wound revision was performed in late May.  Sutures were removed on

or about June 13, 2018.  She developed erythema involving the left lower outer

quadrant as well as the right breast inner upper and lower quadrants.  She was

evaluated as an outpatient, was felt to have cellulitis.  She was given Levaquin and

later clindamycin without significant improvement.  She is now admitted for IV

antibiotic therapy.  A sonogram was performed and showed fluid outside the left

tissue expander.  She denies any wound drainage.  No associated fever or chills.

 

PAST MEDICAL HISTORY:  Positive for left breast invasive ductal carcinoma with

axillary lymph node involvement, asthma, rheumatoid arthritis.

 

PAST SURGICAL HISTORY:  Status post bilateral breast augmentation in 1996.

 

ALLERGIES:  PENICILLIN (throat swelling) and LATEX.

 

MEDICATIONS:  Benadryl, Pepcid, Imodium, Zofran.

 

SOCIAL HISTORY:  Former smoker.

 

SYSTEMS REVIEW:

Neurologic:  No loss of consciousness, seizure activity, focal weakness.

Cardiac:  Negative chest pain or palpitations.

Respiratory:  Negative cough or sputum production.

Gastrointestinal:  Negative vomiting or diarrhea.

Genitourinary:  Negative for urinary tract infection.

 

LABORATORY DATA:  White count 3.9; neutrophils 40, lymphocytes 46, monocytes 6,

eosinophils 6; hematocrit 34.6; platelet count 221.  BUN 17, creatinine 0.8.  Liver

enzymes normal.

 

PHYSICAL EXAMINATION:

General:  Patient is awake and alert, in no acute distress.

Vital Signs:  Temperature 98.8; blood pressure 117/75; pulse 82, regular;

respirations 18 per minute.

HEENT:  Sclerae are anicteric.

Heart:  Sounds S1, S2.

Lungs:  Clear.

Breasts:  There is ill-defined erythema present on the right inner upper and lower

quadrants as well as the left lower quadrant of the breast.  The surgical wound, left

breast, with Steri-Strips in place.  No drainage is noted.

Abdomen:  Soft and nontender.

Extremities:  Negative for edema.

 

IMPRESSION:

1.  Cellulitis.

2.  PENICILLIN allergy.

3.  Left breast invasive ductal carcinoma.

 

Continue empiric vancomycin.  Patient had developed a "red man syndrome" secondary to

the vancomycin with erythema and flushing of the head and neck.  Benadryl was

ordered.  We will continue vancomycin with Benadryl premedication pending cultures. 

Surgical followup.

 

Thank you for the kind referral.

 

 

YUNIOR MURPHY M.D.

 

POLLY/[id number]

DD: 06/19/2018 11:05

DT: 06/19/2018 11:27

Job #:  [id number]

## 2018-06-19 NOTE — PN
Progress Note, Physician


Chief Complaint: 





Left breast cellulitis S/P bilateral mastectomies for left breast cancer left 

revision  with bilateral  breast cellulitis


History of Present Illness: 





Patient  had reaction to vancomycin "Red man syndrome" and was treated with 

benadryl   with improved symptoms, pain managed with dilaudid prn,  CBC 3.9 

from 6/18/2018, CBC today pending and blood cultures from 6/18/2018.Bilateral 

breast us 6/18/2018 showed small amount of fluid left side but not enough to 

aspirate according to Dr Wynn. 





- Current Medication List


Current Medications: 


Active Medications





Diphenhydramine HCl (Benadryl -)  25 mg PO BID@0200,1400 [facility]


   Last Admin: 06/19/18 02:21 Dose:  25 mg


Hydromorphone HCl (Dilaudid -)  2 mg PO Q6H PRN


   PRN Reason: PAIN LEVEL 1-5


Vancomycin HCl (Vancomycin (Pre-Docked))  1,000 mg in 250 mls @ 166.667 mls/hr 

IVPB BID@0230,1430 [facility]EL[facility]SA; Protocol


   Last Admin: 06/19/18 03:02 Dose:  166.667 mls/hr


Lactobacillus Acidophilus (Bacid -)  1 tab PO DAILY [facility]


   Last Admin: 06/19/18 09:34 Dose:  1 tab


Loperamide HCl (Imodium -)  2 mg PO Q8H PRN


   PRN Reason: DIARRHEA


Ondansetron HCl (Zofran -)  4 mg PO Q8H PRN


   PRN Reason: NAUSEA AND/OR VOMITING











- Objective


Vital Signs: 


 Vital Signs











Temperature  97.8 F   06/19/18 06:00


 


Pulse Rate  65   06/19/18 06:00


 


Respiratory Rate  16   06/19/18 08:09


 


Blood Pressure  109/73   06/19/18 06:00


 


O2 Sat by Pulse Oximetry (%)  98   06/19/18 08:09











Constitutional: Yes: No Distress


Breast(s): Yes: Other (Dr Carrasco was also present during exam Bilateral 

erythema with mild petichiae left more than right improving, patient is afebrile

)


Labs: 


 CBC, BMP





 06/18/18 12:58 





 06/18/18 12:58 











Problem List





- Problems


(1) Cellulitis of left breast


Code(s): N61.0 - MASTITIS WITHOUT ABSCESS   





Assessment/Plan





continue  IV vancomycin


cont dilaudid prn


CBC pending


blood cultures pending

## 2018-06-19 NOTE — PN
Progress Note (short form)





- Note


Progress Note: 





Pt well known during reconstruction had sutures out last week with Dr. Espitia....developed dereck/redness over weekend and given po Levaquin


Admitted yesterday to r/o infection. Now on Vanco,no fluid to speak of and no 

real improvement in quality of her feeling. 





This may be low grade but because she needs to start RT beest to treat 

aggressively





Shape and size good.


Will follow in 48 hrs and prob dc.

## 2018-06-20 RX ADMIN — VANCOMYCIN HYDROCHLORIDE SCH MLS/HR: 1 INJECTION, POWDER, LYOPHILIZED, FOR SOLUTION INTRAVENOUS at 14:53

## 2018-06-20 RX ADMIN — DIPHENHYDRAMINE HCL SCH MG: 25 CAPSULE ORAL at 14:17

## 2018-06-20 RX ADMIN — Medication SCH TAB: at 09:56

## 2018-06-20 RX ADMIN — VANCOMYCIN HYDROCHLORIDE SCH MLS/HR: 1 INJECTION, POWDER, LYOPHILIZED, FOR SOLUTION INTRAVENOUS at 02:08

## 2018-06-20 RX ADMIN — DIPHENHYDRAMINE HCL SCH MG: 25 CAPSULE ORAL at 01:33

## 2018-06-20 NOTE — PN
Progress Note, Physician


History of Present Illness: 


Feeling better


No c/o breast pain


No c/o fever/ chills


Tolerated  vancomycin 


  after benadryl premedication


Afebrile


Leukopenic





- Current Medication List


Current Medications: 


Active Medications





Diphenhydramine HCl (Benadryl -)  25 mg PO BID@0200,1400 [facility]


   Last Admin: 06/20/18 01:33 Dose:  25 mg


Hydromorphone HCl (Dilaudid -)  2 mg PO Q6H PRN


   PRN Reason: PAIN LEVEL 1-5


Vancomycin HCl (Vancomycin (Pre-Docked))  1,000 mg in 250 mls @ 166.667 mls/hr 

IVPB BID@0230,1430 MEL[facility]; Protocol


   Last Admin: 06/20/18 02:08 Dose:  166.667 mls/hr


Lactobacillus Acidophilus (Bacid -)  1 tab PO DAILY [facility]


   Last Admin: 06/20/18 09:56 Dose:  1 tab


Loperamide HCl (Imodium -)  2 mg PO Q8H PRN


   PRN Reason: DIARRHEA


Ondansetron HCl (Zofran -)  4 mg PO Q8H PRN


   PRN Reason: NAUSEA AND/OR VOMITING











- Objective


Vital Signs: 


 Vital Signs











Temperature  97.5 F L  06/20/18 06:00


 


Pulse Rate  64   06/20/18 06:00


 


Respiratory Rate  18   06/20/18 06:00


 


Blood Pressure  124/72   06/20/18 06:00


 


O2 Sat by Pulse Oximetry (%)  100   06/20/18 06:00











Constitutional: Yes: No Distress


Eyes: Yes: Conjunctiva Clear


Cardiovascular: Yes: Regular Rate and Rhythm, S1, S2


Respiratory: Yes: CTA Bilaterally


Gastrointestinal: Yes: Normal Bowel Sounds, Soft.  No: Tenderness


Breast(s): Yes: Other (minimal residual erythema L lower breast)


Edema: No


Labs: 


 CBC, BMP





 06/19/18 12:25 





 06/19/18 12:25 











Assessment/Plan





Breast cellulitis  improved


PCN allergy


S/P " red man syndrome" secondary to vancomycin








Continue vancomycin with benadryl premedication


? po Bactrim next 24hr

## 2018-06-20 NOTE — PN
Progress Note, Physician


Chief Complaint: 





Left breast cancer S/P bilateral mastectomies 4/2018, left breast revision with 

bilateral breast cellulitis


History of Present Illness: 





patient is comfortable,no pain, tolerating vancomycin with benadryl, blood 

cultures no growth after 24 hours with continued incubation, CBC 3.9,ready for 

discharge tomorrow from surgical standpoint 





- Current Medication List


Current Medications: 


Active Medications





Diphenhydramine HCl (Benadryl -)  25 mg PO BID@0200,1400 [facility]


   Last Admin: 06/20/18 01:33 Dose:  25 mg


Hydromorphone HCl (Dilaudid -)  2 mg PO Q6H PRN


   PRN Reason: PAIN LEVEL 1-5


Vancomycin HCl (Vancomycin (Pre-Docked))  1,000 mg in 250 mls @ 166.667 mls/hr 

IVPB BID@0230,1430 MEL[facility]SA; Protocol


   Last Admin: 06/20/18 02:08 Dose:  166.667 mls/hr


Lactobacillus Acidophilus (Bacid -)  1 tab PO DAILY [facility]


   Last Admin: 06/19/18 09:34 Dose:  1 tab


Loperamide HCl (Imodium -)  2 mg PO Q8H PRN


   PRN Reason: DIARRHEA


Ondansetron HCl (Zofran -)  4 mg PO Q8H PRN


   PRN Reason: NAUSEA AND/OR VOMITING











- Objective


Vital Signs: 


 Vital Signs











Temperature  97.5 F L  06/20/18 06:00


 


Pulse Rate  64   06/20/18 06:00


 


Respiratory Rate  18   06/20/18 06:00


 


Blood Pressure  124/72   06/20/18 06:00


 


O2 Sat by Pulse Oximetry (%)  100   06/20/18 06:00











Constitutional: Yes: No Distress


Breast(s): Yes: Other (Bilateral incisions intact, decrease redness and 

petichiae bilaterallly no discharge or pain on palpation)


Labs: 


 CBC, BMP





 06/19/18 12:25 





 06/19/18 12:25 











Problem List





- Problems


(1) Cellulitis of left breast


Code(s): N61.0 - MASTITIS WITHOUT ABSCESS   





Assessment/Plan





continue IV vanvomycin with benadryl


dilaudid prn pain


blood cultures currently no growth will follow results


ready for discharge tomorrow from surgical standpoint

## 2018-06-21 VITALS — SYSTOLIC BLOOD PRESSURE: 104 MMHG | HEART RATE: 72 BPM | TEMPERATURE: 97.7 F | DIASTOLIC BLOOD PRESSURE: 60 MMHG

## 2018-06-21 RX ADMIN — Medication SCH TAB: at 09:53

## 2018-06-21 RX ADMIN — DIPHENHYDRAMINE HCL SCH MG: 25 CAPSULE ORAL at 01:55

## 2018-06-21 RX ADMIN — VANCOMYCIN HYDROCHLORIDE SCH MLS/HR: 1 INJECTION, POWDER, LYOPHILIZED, FOR SOLUTION INTRAVENOUS at 02:30

## 2018-06-21 NOTE — PN
Progress Note, Physician


History of Present Illness: 





No c/o breast pain


No c/o fever/ chills


Tolerated  vancomycin 


  after benadryl premedication


Afebrile


BC (-)





- Current Medication List


Current Medications: 


Active Medications





Diphenhydramine HCl (Benadryl -)  25 mg PO BID@0200,1400 [facility]


   Last Admin: 06/21/18 01:55 Dose:  25 mg


Hydromorphone HCl (Dilaudid -)  2 mg PO Q6H PRN


   PRN Reason: PAIN LEVEL 1-5


Vancomycin HCl (Vancomycin (Pre-Docked))  1,000 mg in 250 mls @ 166.667 mls/hr 

IVPB BID@0230,1430 MELISSA; Protocol


   Last Admin: 06/21/18 02:30 Dose:  166.667 mls/hr


Lactobacillus Acidophilus (Bacid -)  1 tab PO DAILY [facility]


   Last Admin: 06/21/18 09:53 Dose:  1 tab


Loperamide HCl (Imodium -)  2 mg PO Q8H PRN


   PRN Reason: DIARRHEA


Ondansetron HCl (Zofran -)  4 mg PO Q8H PRN


   PRN Reason: NAUSEA AND/OR VOMITING











- Objective


Vital Signs: 


 Vital Signs











Temperature  97.7 F   06/21/18 06:52


 


Pulse Rate  72   06/21/18 06:52


 


Respiratory Rate  18   06/21/18 06:52


 


Blood Pressure  104/60   06/21/18 06:52


 


O2 Sat by Pulse Oximetry (%)  100   06/21/18 06:52











Constitutional: Yes: No Distress


Cardiovascular: Yes: Regular Rate and Rhythm, S1, S2


Respiratory: Yes: CTA Bilaterally


Gastrointestinal: Yes: Normal Bowel Sounds, Soft


Breast(s): Yes: Other (Bilateral breast erythema resolved)


Edema: No


Labs: 


 CBC, BMP





 06/19/18 12:25 





 06/19/18 12:25 











Assessment/Plan





Breast cellulitis resolved 


PCN allergy


S/P " red man syndrome" secondary to vancomycin











Substitute  po Bactrim DS bid x 7d


Outpatient surgical follow up

## 2018-06-21 NOTE — PN
Progress Note, Physician


Chief Complaint: 





Cellulitis of chest flaps HD#4


History of Present Illness: 





Patient was seen at the bedside with Dr. Woodruff and reports no chest pain, fever 

or chills.  Patient denies SOB as well.  





- Current Medication List


Current Medications: 


Active Medications





Diphenhydramine HCl (Benadryl -)  25 mg PO BID@0200,1400 [facility]


   Last Admin: 06/21/18 01:55 Dose:  25 mg


Hydromorphone HCl (Dilaudid -)  2 mg PO Q6H PRN


   PRN Reason: PAIN LEVEL 1-5


Vancomycin HCl (Vancomycin (Pre-Docked))  1,000 mg in 250 mls @ 166.667 mls/hr 

IVPB BID@0230,1430 [facility]EL[facility]; Protocol


   Last Admin: 06/21/18 02:30 Dose:  166.667 mls/hr


Lactobacillus Acidophilus (Bacid -)  1 tab PO DAILY [facility]


   Last Admin: 06/20/18 09:56 Dose:  1 tab


Loperamide HCl (Imodium -)  2 mg PO Q8H PRN


   PRN Reason: DIARRHEA


Ondansetron HCl (Zofran -)  4 mg PO Q8H PRN


   PRN Reason: NAUSEA AND/OR VOMITING











- Objective


Vital Signs: 


 Vital Signs











Temperature  97.7 F   06/21/18 06:52


 


Pulse Rate  72   06/21/18 06:52


 


Respiratory Rate  18   06/21/18 06:52


 


Blood Pressure  104/60   06/21/18 06:52


 


O2 Sat by Pulse Oximetry (%)  100   06/21/18 06:52











Constitutional: Yes: Well Nourished, Calm


Breast(s): Yes: Other (Bilateral flaps with good color.  No erythema or 

discharge noted.  Well healed incisions.)


Labs: 


 CBC, BMP





 06/19/18 12:25 





 06/19/18 12:25 











Problem List





- Problems


(1) Breast cancer, left


Code(s): C50.912 - MALIGNANT NEOPLASM OF UNSPECIFIED SITE OF LEFT FEMALE BREAST

   


Qualifiers: 


   Breast location: upper outer quadrant of breast   Patient sex: female 





(2) Cellulitis of left breast


Code(s): N61.0 - MASTITIS WITHOUT ABSCESS   





Assessment/Plan





Assessment/Plan:


Cellulitis of chest has resolved


   -plan to discharge today on po Bactrim DS x 7 days


   -patient to followup with Dr. Wynn and Dr. Mcgee next week


   -patient instructed to go to the [facility] if she notices recurrent erythema, 

fevers or chills.

## 2019-02-14 ENCOUNTER — HOSPITAL ENCOUNTER (OUTPATIENT)
Dept: HOSPITAL 74 - FASU | Age: 44
Discharge: HOME | End: 2019-02-14
Attending: SURGERY
Payer: COMMERCIAL

## 2019-02-14 VITALS — SYSTOLIC BLOOD PRESSURE: 109 MMHG | HEART RATE: 67 BPM | DIASTOLIC BLOOD PRESSURE: 68 MMHG

## 2019-02-14 VITALS — BODY MASS INDEX: 30.4 KG/M2

## 2019-02-14 VITALS — TEMPERATURE: 98.1 F

## 2019-02-14 DIAGNOSIS — N65.1: ICD-10-CM

## 2019-02-14 DIAGNOSIS — M95.4: ICD-10-CM

## 2019-02-14 DIAGNOSIS — Z92.3: ICD-10-CM
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
## 2019-02-14 DIAGNOSIS — Z90.13: Primary | ICD-10-CM

## 2019-02-14 DIAGNOSIS — Z85.3: ICD-10-CM

## 2019-02-14 DIAGNOSIS — Z92.21: ICD-10-CM

## 2019-02-14 DIAGNOSIS — T85.41XA: ICD-10-CM

## 2019-02-14 PROCEDURE — 0HRV07Z REPLACEMENT OF BILATERAL BREAST WITH AUTOLOGOUS TISSUE SUBSTITUTE, OPEN APPROACH: ICD-10-PCS | Performed by: SURGERY

## 2019-02-14 PROCEDURE — 0HRV0JZ REPLACEMENT OF BILATERAL BREAST WITH SYNTHETIC SUBSTITUTE, OPEN APPROACH: ICD-10-PCS | Performed by: SURGERY

## 2019-02-14 PROCEDURE — 0HPT0JZ REMOVAL OF SYNTHETIC SUBSTITUTE FROM RIGHT BREAST, OPEN APPROACH: ICD-10-PCS | Performed by: SURGERY

## 2019-02-14 PROCEDURE — 05H533Z INSERTION OF INFUSION DEVICE INTO RIGHT SUBCLAVIAN VEIN, PERCUTANEOUS APPROACH: ICD-10-PCS | Performed by: SURGERY

## 2019-02-14 PROCEDURE — 0HPU0JZ REMOVAL OF SYNTHETIC SUBSTITUTE FROM LEFT BREAST, OPEN APPROACH: ICD-10-PCS | Performed by: SURGERY

## 2019-02-14 NOTE — SURG
Surgery First Assist Note


First Assist: Jocy Amezcua PA-C


Date of Service: 02/14/19


Diagnosis: 





B/L Acquired chest wall abnormality and asymmetry s/p mastectomy with tissue 

expanders


Procedure: 





B/L breast capsulectomies, removal of tissue expanders and insertion of implants

, Subcutaneous tissue transfer from Abdomen and flanks to both breasts.


I was present for the entirety of the operative procedure. For further detail, 

please refer to operative report.








Visit type





- Case Type


Case Type: Scheduled





- Emergency


Emergency Visit: No





- New patient


This patient is new to me today: Yes


Date on this admission: 02/14/19

## 2019-02-14 NOTE — OP
Operative Note





- Note:


Operative Date: 02/14/19


Pre-Operative Diagnosis: B/L Aquired chest wall abnormality and asymmetry s/p 

mastectomy with tissue expanders


Operation: B/L breast capsulectomies, removal of tissue expanders and insertion 

of implants, Subcutaneous tissue transfer from Abdomen and flanks to both 

breasts.


Post-Operative Diagnosis: Same as Pre-op


Surgeon: Andres Mcgee


Assistant: Jocy Amezcua


Anesthesiologist/CRNA: Leventhal,Joshua


Anesthesia: General, Local


Specimens Removed: b/l tissue expanders


Estimated Blood Loss (mls): 20


Fluid Volume Replaced (mls): 900


Operative Report Dictated: Yes

## 2019-02-15 NOTE — OP
DATE OF OPERATION:  02/14/2019

 

PREOPERATIVE DIAGNOSIS:  History of left breast upper outer quadrant breast cancer

status post bilateral mastectomies and chemotherapy.

 

POSTOPERATIVE DIAGNOSIS:  History of left breast upper outer quadrant breast cancer

status post bilateral mastectomies and chemotherapy.

 

PROCEDURE:  Removal of right-sided subclavian vein single-lumen portacath.  

 

ANESTHESIA:  General laryngeal mask airway anesthesia.  

 

PRIMARY SURGEON:  Leno Wynn MD

 

COMPLICATIONS:  There were no complications.  

 

DESCRIPTION OF PROCEDURE:  Briefly, the patient is a 43-year-old G-5, P-2 white

female with a history of a left breast cancer, which was diagnosed back in October of 2017.  This developed over an augmentation implant.  She had a few separate foci of

cancer in the left breast and was found to have no positive disease, which was ER

positive, TX negative, and HER2 3+.  She underwent neoadjuvant chemotherapy and

eventually underwent bilateral total mastectomies through a reduction pattern

incision on April 10, 2018, with bilateral expander reconstructions.  The patient has

been doing well and continues on Herceptin and Perjeta through her right-sided

portacath.  The patient then required exchange of the expanders for implants, had

finished chemotherapy and now presents for removal of her right-sided portacath at

the same time as her exchange of expanders for implants.  

 

The patient was brought in through ambulatory surgery on February 14, 2019.  She was

marked preoperatively by the plastic surgeons for the expander exchange for implants.

 Consent was obtained, and site verification was made.  She was brought into the

operating room and laid on the OR table in the supine position.  Venodynes were

placed on the lower extremities prior to induction.  She received general laryngeal

mask airway anesthesia.  Both breasts were sterilely prepped and draped in the usual

fashion with the abdominal wall prepped as well for fat grafting, which was being

performed by the plastic surgeon.  The breasts were sterilely prepped and draped in

the usual fashion.  

 

The upper right chest wall was approached, and the previous portacath incision was

used, and the portacath was completely removed from the infraclavicular pocket intact

and sent to Pathology.  The capsule was excised, and hemostasis was achieved.  The

incision was then closed using some deep 3-0 Vicryl suture, and the skin was closed

using interrupted 3-0 deep dermal Vicryl suture and a running 4-0 subcuticular Biosyn

suture.  Steri-Strips were placed over the wound.  

 

The patient tolerated the procedure well without difficulty and estimated blood loss

was minimal.  The rest of the procedure will be dictated by Plastic Surgery with the

exchange of the expanders for implants and fat grafting.  The patient will be

discharged home the same day once discharge criteria are met and should follow up in

the office for wound evaluation in about 1 week.  All sponge and needle counts were

correct at this point in the case.  

 

 

LENO WYNN M.D.

 

[id number]

DD: 02/14/2019 12:25

DT: 02/15/2019 07:20

Job #:  [id number]

## 2019-02-15 NOTE — OP
DATE OF OPERATION:  02/14/2019

 

SURGEON:  Leno Mcgee MD 

 

ASSISTANT SURGEON:  Jocy Amezcua PA-C

 

PREOPERATIVE DIAGNOSES:  

1.  Bilateral acquired chest wall deformities status post bilateral mastectomy with

history of breast cancer, chemotherapy, and radiation therapy.  This is a combined

dictation with Dr. Leno Wynn, who removed the right chest wall port.  

2.  Asymmetry of reconstructed chest wall.

3.  Mechanical complication of breast implant.

 

POSTOPERATIVE DIAGNOSES:   

1.  Bilateral acquired chest wall deformities status post bilateral mastectomy with

history of breast cancer, chemotherapy, and radiation therapy.  This is a combined

dictation with Dr. Leno Wynn, who removed the right chest wall port.  

2.  Asymmetry of reconstructed chest wall.

3.  Mechanical complication of breast implant.

 

OPERATIVE PROCEDURE: 

1.  Right breast reconstruction utilizing other technique.

2.  Left breast reconstruction utilizing other technique.

3.  Capsulectomy, removal of right breast implant and replacement.

4.  Capsulectomy, removal and replacement of the left breast implant.  

 

OPERATIVE INDICATIONS:   The patient is a young woman who had been diagnosed with a

breast cancer which was severe in nature, requiring chemotherapy and radiation

therapy.  The patient now presents with gross asymmetry of the chest wall, need for

symmetry, and removal of the Port-A-Cath on the right chest wall by Dr. Leno Wynn.  A combined procedure was carried out to facilitate these procedures.  All

questions were asked and answered.  Patient agreed to the planned procedure.  Was

marked in the standing position preoperatively with the patient's and 's

knowledge.  

 

OPERATIVE PROCEDURE IN DETAIL:  Patient was taken to the operating room, and after

induction of general anesthesia in the usual fashion, attention was turned to the

chest wall and abdomen.  The entire chest wall including the upper chest wall for

port removal was prepped with ChloraPrep solution over its entire extent, as well as

all the way down to the pubis and flanks.  At this point sterile drapes were placed

in usual fashion, and attention was turned to timeout.  After prepping and draping

and timeout, attention was turned to the mastectomy scars.  These were injected with

1% local lidocaine anesthesia with 1:100,000 epinephrine, and then incisions were

planned in the flank area in order to harvest tissue.  These incisions were injected,

and a dilute solution injected for hemostasis.  

 

At this point, Dr. Leno Wynn performed removal of the chest wall Port-A-Cath

from chemotherapy and will be dictated under separate cover.  

 

At this point, attention was turned back to the right chest wall mastectomy scar. 

Incision was made down through skin and subcutaneous tissue in the lateral portion of

the chest wall scar, down through the skin into the subcutaneous tissue and down to

the underlying capsule.  The capsule was then opened and the textured device was

removed.  This showed some minor amounts of fluid around the implant, but the capsule

itself required revision.  Portions of the capsule were then removed and the superior

portion of the breast itself and sent for pathologic diagnosis.  Also the lateral

portion of the capsule required capsulorrhaphy.  After 1% local lidocaine anesthesia,

a No. 1 V-Loc suture was used to perform capsulorrhaphy in the lateral gutter of the

right chest wall.  In 3-layer fashion was tacked down to a new position in order to

centralize the implant and prevent malposition which had previously occurred.  

 

Once this was accomplished, triple antibiotic solution, hemostasis was meticulously

obtained throughout the pocket, and then attention was turned to the left chest wall.

 After incising the chest wall mastectomy scar in similar fashion, attention was

turned to the abdomen.  

 

Incisions were made in the abdominal wall down through the skin into the deep tissue

over the rectus and abdominus fascia, as well as the external oblique fascia

laterally.  Tissue was then harvested from both sides of the abdominal wall,

connecting to the midline.  This tissue was then transferred to the back table,

washed, cleansed, and prepared for reconstruction.  

 

After harvest of the tissue, the donor sites were closed with interrupted running

sutures, and attention was turned back to the chest wall.  The exact same procedure

was carried out, symmetrically removing the implant device on the left chest wall. 

This was the radiated side, so careful attention was paid to tissue handling of the

skin and subcutaneous tissue, which appeared to be indurated from the radiation

effect.  At this point an implant was chosen for the patient.  As per the patient's

wishes, a [employer] [place] SoftTouch implant, style SSX, 650-mL volume, was placed

into the right chest wall pocket using the West funnel, and then the left chest

wall pocket using the West funnel.  At this point good symmetry was seen, although

the status required transfer.  Tissue was then transferred from the back table to the

right breast in the superomedial and central portions of the right breast and to the

lateral, central, medial, and inferior portions of the right breast.  Good shape and

contour was then seen again in the sitting position.  The wounds were closed

symmetrically with 2-0 Vicryl suture on the deep tissue fascia, 3-0 Biosyn in a deep

dermal fashion, and a subcuticular suture with V-Loc on the left breast of the

radiated side and 4-0 Biosyn on the right side.  All wounds were dressed sterilely

with Dermabond and Steri-Strip dressings, including the chest wall Port-A-Cath.  The

patient tolerated the procedure well.  She was awakened, extubated, and transferred

to the recovery room in a Surgi-Bra with fluff dressing.

 

 

LENO MCGEE M.D.

 

[id number]

DD: 02/14/2019 15:39

DT: 02/15/2019 09:57

Job #:  [id number]

## 2019-02-18 NOTE — PATH
Surgical Pathology Report



Patient Name:  MICHAEL MATIAS

Accession #:  

Med. Rec. #:  [id number]                                                        

   /Age/Gender:  1975 (Age: 43) / F

Account:  [id number]                                                          

             Location: [facility] AMBULATORY 

Taken:  2019

Received:  2019

Reported:  2019

Physicians:  Andres Mcgee

  



Specimen(s) Received

A: PORTACATH 

B: BILATERAL BREAST IMPLANTS 

C: LEFT BREAST CAPSULE 

D: LEFT BREAST CAPSULE 





Clinical History

History of left breast cancer status post bilateral mastectomies with expanders







Final Diagnosis

A. PORT-A-CATH, REMOVAL:

PORT-A-CATH, AS DESCRIBED (GROSS EXAMINATION ONLY).



B. BILATERAL BREAST IMPLANTS, REMOVAL:

IMPLANTS, AS DESCRIBED (GROSS EXAMINATION ONLY).



C. CAPSULE, LEFT BREAST, CAPSULECTOMY:

FIBROUS CAPSULE SHOWING MODERATE ACUTE AND CHRONIC INFLAMMATION WITH

HISTIOCYTIC/FOREIGN BODY GIANT CELL REACTION. 



D. CAPSULE, RIGHT BREAST, CAPSULECTOMY:

FIBROUS CAPSULE.







***Electronically Signed***

Jocy Kemp M.D.





Gross Description

A. Received fresh labeled "Port-A-Cath," is a 3.2 cm in diameter x 1.4 cm in

depth a white, circular device, consistent with a bhavin cath. The Port-A-Cath

displays a 19 cm in length portion of blue tubing extending from one aspect. No

soft tissue is present. No sections are submitted, gross only.



B. Received fresh labeled "bilateral breast implants," are 2 tan, rubbery,

intact breast implants averaging 14.0 x 11.5 x 6.0 cm. No soft tissue is

present. No sections are submitted, gross only.



C. Received in formalin labeled "left breast capsule," is a 2.5 x 2.0 x 0.4 cm

aggregate of tan-brown portions of fibrous capsule. Representative sections are

submitted in one cassette.



D. Received in formalin labeled "right breast capsule," is a 2.5 x 0.8 x 0.3 cm

tan-pink portion of fibrous capsule. The specimen is sectioned and entirely

submitted in one cassette.

/2/15/2019



saudi/2/15/2019

## 2019-09-19 ENCOUNTER — FORM ENCOUNTER (OUTPATIENT)
Age: 44
End: 2019-09-19

## 2019-11-14 ENCOUNTER — FORM ENCOUNTER (OUTPATIENT)
Age: 44
End: 2019-11-14

## 2020-05-14 ENCOUNTER — FORM ENCOUNTER (OUTPATIENT)
Age: 45
End: 2020-05-14

## 2020-11-05 ENCOUNTER — FORM ENCOUNTER (OUTPATIENT)
Age: 45
End: 2020-11-05

## 2020-11-20 ENCOUNTER — FORM ENCOUNTER (OUTPATIENT)
Age: 45
End: 2020-11-20

## 2020-12-22 DIAGNOSIS — E80.4 GILBERT SYNDROME: ICD-10-CM

## 2020-12-22 DIAGNOSIS — Z80.8 FAMILY HISTORY OF MALIGNANT NEOPLASM OF OTHER ORGANS OR SYSTEMS: ICD-10-CM

## 2020-12-22 DIAGNOSIS — Z80.41 FAMILY HISTORY OF MALIGNANT NEOPLASM OF OVARY: ICD-10-CM

## 2020-12-22 DIAGNOSIS — T85.43XA LEAKAGE OF BREAST PROSTHESIS AND IMPLANT, INITIAL ENCOUNTER: ICD-10-CM

## 2020-12-22 DIAGNOSIS — Q99.8 OTHER SPECIFIED CHROMOSOME ABNORMALITIES: ICD-10-CM

## 2020-12-22 DIAGNOSIS — Z80.3 FAMILY HISTORY OF MALIGNANT NEOPLASM OF BREAST: ICD-10-CM

## 2020-12-22 DIAGNOSIS — Z80.49 FAMILY HISTORY OF MALIGNANT NEOPLASM OF OTHER GENITAL ORGANS: ICD-10-CM

## 2020-12-22 DIAGNOSIS — Z87.891 PERSONAL HISTORY OF NICOTINE DEPENDENCE: ICD-10-CM

## 2020-12-22 DIAGNOSIS — Z80.0 FAMILY HISTORY OF MALIGNANT NEOPLASM OF DIGESTIVE ORGANS: ICD-10-CM

## 2020-12-22 RX ORDER — ALBUTEROL SULFATE 90 UG/1
AEROSOL, METERED RESPIRATORY (INHALATION)
Refills: 0 | Status: ACTIVE | COMMUNITY

## 2020-12-23 ENCOUNTER — APPOINTMENT (OUTPATIENT)
Dept: HEMATOLOGY ONCOLOGY | Facility: CLINIC | Age: 45
End: 2020-12-23
Payer: COMMERCIAL

## 2020-12-23 VITALS
DIASTOLIC BLOOD PRESSURE: 100 MMHG | HEIGHT: 63 IN | TEMPERATURE: 98.6 F | OXYGEN SATURATION: 97 % | WEIGHT: 196 LBS | HEART RATE: 87 BPM | SYSTOLIC BLOOD PRESSURE: 160 MMHG | BODY MASS INDEX: 34.73 KG/M2 | RESPIRATION RATE: 18 BRPM

## 2020-12-23 PROCEDURE — 99072 ADDL SUPL MATRL&STAF TM PHE: CPT

## 2020-12-23 PROCEDURE — 99205 OFFICE O/P NEW HI 60 MIN: CPT

## 2020-12-23 RX ORDER — GOSERELIN ACETATE 3.6 MG/1
3.6 IMPLANT SUBCUTANEOUS
Refills: 0 | Status: ACTIVE | COMMUNITY

## 2020-12-23 RX ORDER — LOPERAMIDE HCL 2 MG
2 CAPSULE ORAL
Refills: 0 | Status: ACTIVE | COMMUNITY

## 2020-12-23 NOTE — REASON FOR VISIT
[Initial Consultation] : an initial consultation [FreeTextEntry2] : clinical T1cN1a Stage IIa left breast cancer.

## 2020-12-23 NOTE — PHYSICAL EXAM
[Restricted in physically strenuous activity but ambulatory and able to carry out work of a light or sedentary nature] : Status 1- Restricted in physically strenuous activity but ambulatory and able to carry out work of a light or sedentary nature, e.g., light house work, office work [Normal] : affect appropriate [de-identified] : bilateral mastecomty no adenopathy no skin lesions,  no masses

## 2020-12-23 NOTE — REVIEW OF SYSTEMS
[SOB on Exertion] : shortness of breath during exertion [Diarrhea] : diarrhea [Hot Flashes] : hot flashes [Easy Bleeding] : a tendency for easy bleeding [Negative] : Psychiatric [Fever] : no fever [Chills] : no chills [Recent Change In Weight] : ~T no recent weight change [Shortness Of Breath] : no shortness of breath [Wheezing] : no wheezing [Cough] : no cough [Anxiety] : no anxiety [Easy Bruising] : no tendency for easy bruising [Swollen Glands] : no swollen glands [FreeTextEntry2] : frequent hot flashes [FreeTextEntry4] : occasional spasms, trouble swallowing large pills, since radiaiton [FreeTextEntry7] : with Nerlynx, controlled with 2 Imodium daily [FreeTextEntry8] : frequent UTIs on Nerlynx, on prophylactic ABX [de-identified] : numbness and tingling hands and feet - neuropathy

## 2020-12-23 NOTE — HISTORY OF PRESENT ILLNESS
[de-identified] : Ms Jonas is a 45 year old woman who was diagnosed with a pT1cN1 stage 2a , ER + NM+ HER 2 nicole positive breast cancer in October 2017, after self palpating a lump in the left. She had breast imaging at [facility] in [place]. Left breast biopsy was done at [facility] [place] [facility]par \par She had neoadjuvant chemotherapy with Dr Pacheco, 11/2017 [facility] had pCR  followed by a year completion of HP.  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 On b12 shots .  Bilateral mastectomies with reconstruction with Askikari at [facility] which revealed no evidence of residual disease. She underwent post-operative radiation therapy with Dr Purdy at [facility].  She underwent menopause during chemotherapy, and then menses resumed 2019.  She then started ovarian suppression with Zoladex in March of 2019. She  then began exemestane in 2019.  \par Hx of chest pain - [facility] \par \par Began Nerlynx in January of 2020 .  + diarrhea, intermittent toe infections.  She had several breaks in Nerlyx treatments because of infection and respiratory illness.  Tested negative for COVID. Total of 13 weeks of breaks from Nerlynx.  She believes due to complete late April 2021. currently on 4 pills a day \par \par There have been several post operative infections and complication requiring multiple reconstructive surgeries.  Chronic lymphedema of both arms, also has had several episodes of bilateral chest wall cellullitis.\par hx of uti on spressive nitrofuriton.  hx of high bp \par \par She is here to transfer care. \par \par Sees rheumatology. a lot of joint pains.  [de-identified] : Hx of chest pain - [facility] echo , hasn’t seen cardiology ( has family hx ) , dr okeefe \par last year dexa done \par urine test every visit \par on zoladex every month (get auth) \par gained weight 70 pounds  the  lost 14 pounds \par lymphedema - was going to PT has sleeves , needs new ones \par saw laura a few weeks ago \par worsening back and hip pain \par

## 2020-12-23 NOTE — HISTORY OF PRESENT ILLNESS
[de-identified] : Ms Jonas is a 45 year old woman who was diagnosed with a pT1cN1 stage 2a , ER + WI+ HER 2 nicole positive breast cancer in October 2017, after self palpating a lump in the left. She had breast imaging at [facility] in [place]. Left breast biopsy was done at [facility] in [facility].\par \par She had neoadjuvant chemotherapy with Dr Pacheco, 11/2017 [facility] had pCR  followed by a year completion of HP.  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 On b12 shots .  Bilateral mastectomies with reconstruction with Askikari at [facility] which revealed no evidence of residual disease. She underwent post-operative radiation therapy with Dr Purdy at [facility].  She underwent menopause during chemotherapy, and then menses resumed 2019.  She then started ovarian suppression with Zoladex in March of 2019. She  then began exemestane in 2019.  \par Hx of chest pain - [facility] \par \par Began Nerlynx in January of 2020 .  + diarrhea, intermittent toe infections.  She had several breaks in Nerlyx treatments because of infection and respiratory illness.  Tested negative for COVID. Total of 13 weeks of breaks from Nerlynx.  She believes due to complete late April 2021. currently on 4 pills a day \par \par There have been several post operative infections and complication requiring multiple reconstructive surgeries.  Chronic lymphedema of both arms, also has had several episodes of bilateral chest wall cellullitis.\par hx of uti on spressive nitrofuriton.  hx of high bp \par \par She is here to transfer care. \par \par Sees rheumatology. a lot of joint pains.  [de-identified] : Hx of chest pain - [facility] echo , hasn’t seen cardiology ( has family hx ) , dr okeefe \par last year dexa done \par urine test every visit \par on zoladex every month (get auth) \par gained weight 70 pounds  the  lost 14 pounds \par lymphedema - was going to PT has sleeves , needs new ones \par saw laura a few weeks ago \par worsening back and hip pain \par

## 2020-12-23 NOTE — ASSESSMENT
[FreeTextEntry1] : THis is a 46 y/o woman with a hx of her positive er positive a pT1cN1 stage 2a  of left breast s/p neoadjuvant TCHP followed by bilateral mastecomty with node dissection , negative for residual disease , pCR. \par S/p 12 additional doses of Herceptin and perjeta. THen  radiation. Then started on nerlynx in jan 2020 . \par SHe also started on zoladex and exemestane in 2019. \par \par 1) breast cancer \par doing well overalll \par records reviewed from previous onc, consistant with very good treatment so far  agree with henny \par completed 1 year of nerlynx , discussed improvement in DFS particularly in er pos scpace , there may also be CNS penetration \par discussed joint pains may be due to exemestane,  reviewed change to letrozole  but patient declines for now \par cont zoladex , discussed oophorectomy \par genetics negative get report \par repeat markers \par check petct for chest pain and back pain rule out mets \par proceed with zoladex and exemestane \par cont nerlynx until april 2021 \par \par 2) back pain \par could be due to antiestrogen therapy \par discussed change ai \par check petct to rule out mets \par \par 3) lymphedema \par follow up PT \par \par 4) chest pain \par echo ok \par get petct \par advise cardiology consult \par \par 5) diarrhea \par due to nerlynx , cont imodium \par advise colonoscopy given famly hx colon cancer \par \par 6) obesity \par advise weight loss and exercise \par \par 7) chronic uti \par check ua and uc \par \par 8) osteopenia \par consider zometa or prolia \par get result for dexa \par \par 9) vit d def \par cont vit d \par check level \par \par \par dw patient at length , follow up in 2 weeks to review petct \par

## 2020-12-23 NOTE — REVIEW OF SYSTEMS
[SOB on Exertion] : shortness of breath during exertion [Diarrhea] : diarrhea [Hot Flashes] : hot flashes [Easy Bleeding] : a tendency for easy bleeding [Negative] : Psychiatric [Fever] : no fever [Chills] : no chills [Recent Change In Weight] : ~T no recent weight change [Shortness Of Breath] : no shortness of breath [Wheezing] : no wheezing [Cough] : no cough [Anxiety] : no anxiety [Easy Bruising] : no tendency for easy bruising [Swollen Glands] : no swollen glands [FreeTextEntry2] : frequent hot flashes [FreeTextEntry4] : occasional spasms, trouble swallowing large pills, since radiaiton [FreeTextEntry7] : with Nerlynx, controlled with 2 Imodium daily [FreeTextEntry8] : frequent UTIs on Nerlynx, on prophylactic ABX [de-identified] : numbness and tingling hands and feet - neuropathy

## 2020-12-23 NOTE — PHYSICAL EXAM
[Restricted in physically strenuous activity but ambulatory and able to carry out work of a light or sedentary nature] : Status 1- Restricted in physically strenuous activity but ambulatory and able to carry out work of a light or sedentary nature, e.g., light house work, office work [Normal] : affect appropriate [de-identified] : bilateral mastecomty no adenopathy no skin lesions,  no masses

## 2020-12-24 LAB
25(OH)D3 SERPL-MCNC: 18.2 NG/ML
ALBUMIN SERPL ELPH-MCNC: 4.6 G/DL
ALP BLD-CCNC: 104 U/L
ALT SERPL-CCNC: 17 U/L
ANION GAP SERPL CALC-SCNC: 16 MMOL/L
AST SERPL-CCNC: 18 U/L
BASOPHILS # BLD AUTO: 0.03 K/UL
BASOPHILS NFR BLD AUTO: 0.6 %
BILIRUB SERPL-MCNC: 0.7 MG/DL
BUN SERPL-MCNC: 15 MG/DL
CALCIUM SERPL-MCNC: 9.6 MG/DL
CANCER AG15-3 SERPL-ACNC: 9.7 U/ML
CEA SERPL-MCNC: 1.4 NG/ML
CHLORIDE SERPL-SCNC: 101 MMOL/L
CO2 SERPL-SCNC: 25 MMOL/L
CREAT SERPL-MCNC: 1.01 MG/DL
EOSINOPHIL # BLD AUTO: 0.29 K/UL
EOSINOPHIL NFR BLD AUTO: 5.4 %
GLUCOSE SERPL-MCNC: 64 MG/DL
HCT VFR BLD CALC: 38 %
HGB BLD-MCNC: 12.8 G/DL
IMM GRANULOCYTES NFR BLD AUTO: 0.2 %
LYMPHOCYTES # BLD AUTO: 1.85 K/UL
LYMPHOCYTES NFR BLD AUTO: 34.2 %
MAN DIFF?: NORMAL
MCHC RBC-ENTMCNC: 31 PG
MCHC RBC-ENTMCNC: 33.7 GM/DL
MCV RBC AUTO: 92 FL
MONOCYTES # BLD AUTO: 0.37 K/UL
MONOCYTES NFR BLD AUTO: 6.8 %
NEUTROPHILS # BLD AUTO: 2.86 K/UL
NEUTROPHILS NFR BLD AUTO: 52.8 %
PLATELET # BLD AUTO: 230 K/UL
POTASSIUM SERPL-SCNC: 4.2 MMOL/L
PROT SERPL-MCNC: 7.1 G/DL
RBC # BLD: 4.13 M/UL
RBC # FLD: 12 %
SODIUM SERPL-SCNC: 142 MMOL/L
TSH SERPL-ACNC: 2.15 UIU/ML
VIT B12 SERPL-MCNC: 997 PG/ML
WBC # FLD AUTO: 5.41 K/UL

## 2021-01-11 ENCOUNTER — TRANSCRIPTION ENCOUNTER (OUTPATIENT)
Age: 46
End: 2021-01-11

## 2021-01-25 ENCOUNTER — APPOINTMENT (OUTPATIENT)
Dept: HEMATOLOGY ONCOLOGY | Facility: CLINIC | Age: 46
End: 2021-01-25
Payer: COMMERCIAL

## 2021-01-25 VITALS
TEMPERATURE: 98.5 F | HEART RATE: 85 BPM | OXYGEN SATURATION: 97 % | SYSTOLIC BLOOD PRESSURE: 137 MMHG | RESPIRATION RATE: 18 BRPM | DIASTOLIC BLOOD PRESSURE: 96 MMHG | WEIGHT: 196 LBS | BODY MASS INDEX: 34.72 KG/M2

## 2021-01-25 PROCEDURE — 99072 ADDL SUPL MATRL&STAF TM PHE: CPT

## 2021-01-25 PROCEDURE — 99214 OFFICE O/P EST MOD 30 MIN: CPT

## 2021-01-25 NOTE — ASSESSMENT
[FreeTextEntry1] :  THis is a 44 y/o woman with a hx of her positive er positive a pT1cN1 stage 2a  of left breast s/p neoadjuvant TCHP followed by bilateral mastecomty with node dissection , negative for residual disease , pCR. \par S/p 12 additional doses of Herceptin and perjeta. THen  radiation. Then started on nerlynx in jan 2020 . \par SHe also started on zoladex and exemestane in 2019. \par \par 1) breast cancer \par doing well overalll \par PET/CT negative for any evidence of metastases, discussed with patient at length\par Markers negative we will continue to monitor every 3 months\par proceed with zoladex and continue exemestane \par cont nerlynx until april 2021 \par \par 2) back pain \par PET/CT negative for metastases discussed with patient at length\par Exemestane may be contributing as is Zoladex, consider possible switch to a different AI patient would like to wait for now\par \par 3) lymphedema \par Continue PT\par \par 4) chest pain \par echo ok \par PET/CT negative for any evidence of lung or chest wall or bony disease discussed with patient\par Given her family history of cardiac disease she absolutely needs to see a cardiologist, referred her to Dr. sukumar corey \par \par 5) diarrhea \par due to nerlynx , cont imodium \par advise colonoscopy given famly hx colon cancer \par \par 6) obesity \par advise weight loss and exercise \par \par 7) chronic uti \par check ua and uc today\par \par 8) osteopenia \par Continue vitamin D and exercise\par dexa normal feb 2020  \par \par 9) vit d def \par Start vitamin D 2000 units a day, level was low discussed with patient\par \par Discussed with patient at length, follow-up in 1 month while on Nerlynx, once complete completes Nerlynx will change to every 3 months.  Repeat CBC CMP today

## 2021-01-25 NOTE — REVIEW OF SYSTEMS
[SOB on Exertion] : shortness of breath during exertion [Diarrhea] : diarrhea [Hot Flashes] : hot flashes [Easy Bleeding] : a tendency for easy bleeding [Negative] : Psychiatric [Fever] : no fever [Chills] : no chills [Recent Change In Weight] : ~T no recent weight change [Shortness Of Breath] : no shortness of breath [Wheezing] : no wheezing [Cough] : no cough [Anxiety] : no anxiety [Easy Bruising] : no tendency for easy bruising [Swollen Glands] : no swollen glands [FreeTextEntry4] : occasional spasms, trouble swallowing large pills, since radiaiton [FreeTextEntry2] : frequent hot flashes [FreeTextEntry7] : with Nerlynx, controlled with 2 Imodium daily [FreeTextEntry8] : frequent UTIs on Nerlynx, on prophylactic ABX [de-identified] : numbness and tingling hands and feet - neuropathy

## 2021-01-25 NOTE — HISTORY OF PRESENT ILLNESS
[de-identified] : Ms Jonas is a 45 year old woman who was diagnosed with a pT1cN1 stage 2a , ER + [place]+ HER 2 nicole positive breast cancer in October 2017, after self palpating a lump in the left. She had breast imaging at [facility] in [place]. Left breast biopsy was done at [facility] in [facility]par \par She had neoadjuvant chemotherapy with Dr Pacheco, 11/2017 [facility] had pCR  followed by a year completion of HP.  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 On b12 shots .  Bilateral mastectomies with reconstruction with Askikari at [facility] which revealed no evidence of residual disease. She underwent post-operative radiation therapy with Dr Purdy at [facility].  She underwent menopause during chemotherapy, and then menses resumed 2019.  She then started ovarian suppression with Zoladex in March of 2019. She  then began exemestane in 2019.  \par Hx of chest pain - [facility] \par \par Began Nerlynx in January of 2020 .  + diarrhea, intermittent toe infections.  She had several breaks in Nerlyx treatments because of infection and respiratory illness.  Tested negative for COVID. Total of 13 weeks of breaks from Nerlynx.  She believes due to complete late April 2021. currently on 4 pills a day \par \par There have been several post operative infections and complication requiring multiple reconstructive surgeries.  Chronic lymphedema of both arms, also has had several episodes of bilateral chest wall cellullitis.\par hx of uti on spressive nitrofuriton.  hx of high bp \par \par She is here to transfer care. \par \par Sees rheumatology. a lot of joint pains.  [de-identified] : \par \par \par INTERVAL HX \par PETCT negative for any mets or lesions jan 2021 \par vit d is low \par Patient has not restarted PT yet\par Continues on Nerlynx continues to have mild diarrhea controlled with 2 Imodium a day\par Labs from last visit show a low vitamin D level markers are normal

## 2021-01-25 NOTE — REASON FOR VISIT
[Initial Consultation] : an initial consultation [FreeTextEntry2] : clinical T1cN1a Stage IIa left breast cancer.,  Here to review PET scan and blood work

## 2021-02-09 ENCOUNTER — NON-APPOINTMENT (OUTPATIENT)
Age: 46
End: 2021-02-09

## 2021-02-23 ENCOUNTER — APPOINTMENT (OUTPATIENT)
Dept: HEMATOLOGY ONCOLOGY | Facility: CLINIC | Age: 46
End: 2021-02-23
Payer: COMMERCIAL

## 2021-02-23 VITALS
WEIGHT: 195 LBS | SYSTOLIC BLOOD PRESSURE: 140 MMHG | BODY MASS INDEX: 34.54 KG/M2 | DIASTOLIC BLOOD PRESSURE: 89 MMHG | OXYGEN SATURATION: 97 % | RESPIRATION RATE: 18 BRPM | HEART RATE: 81 BPM | TEMPERATURE: 98 F

## 2021-02-23 PROCEDURE — 99072 ADDL SUPL MATRL&STAF TM PHE: CPT

## 2021-02-23 PROCEDURE — 99214 OFFICE O/P EST MOD 30 MIN: CPT

## 2021-02-23 NOTE — PHYSICAL EXAM
[Restricted in physically strenuous activity but ambulatory and able to carry out work of a light or sedentary nature] : Status 1- Restricted in physically strenuous activity but ambulatory and able to carry out work of a light or sedentary nature, e.g., light house work, office work [Normal] : affect appropriate [de-identified] : Mild ungual swelling on the right large toe, there is no erythema or drainage, and the prior photo the area was much more erythematous

## 2021-02-23 NOTE — HISTORY OF PRESENT ILLNESS
Nursing notes reviewed and accepted.    Jessee Holm is a 16 month old male who presents for 15 month well child exam.  Patient presents with Mother and with sibling(s).    Concerns raised today include: none     Diet:   Milk - refuses all milk, but eat all dairy products and drinks some smoothies.  Mom stopped nursing about 2 months ago.   Vegetables-good   Fruit-good   Meat/Protein--good   No food allergies or reactions.  No bowel or bladder problems.    Developmental:   He is just starting to walk and can take about 5 steps at a time.  10+ words, no concerns with hearing or vision.  Good with spoon, OK with fork.  Drinking from a sippy cup.      NKDA  Meds: none    Birth history, medical history, surgical history, and family history reviewed and updated.    PHYSICAL EXAM:  Height 32.25\" (81.9 cm), weight 10.5 kg, head circumference 48 cm (18.9\").    GEN:  Well appearing  male child, nontoxic, no acute distress.  Alert and interactive.  SKIN: Warm, normal turgor.  No cyanosis.  No bruises or lesions.  HEAD:  Normocephalic, atraumatic.  Anterior fontannel open, soft and flat.  EYES:  Conjunctiva appear normal, non-injected, non-icteric.  NOSE:  Appears normal, no flaring.  EARS:  Normal pinnae.  TMs are transparent with good landmarks.  THROAT:  Oropharynx with moist mucus membranes and no lesions.  NECK:  Supple, no lymphadenopathy or masses.  HEART:  Regular rate and rhythm.  Quiet precordium.  Normal S1, S2.  No murmurs, rubs, gallops.   LUNGS:  Clear to auscultation bilaterally.  No wheezes, rales, rhonchi.  Normal work of breathing.  ABD:  Soft, nontender.  No organomegaly or masses.  :  Magdaleno 1 male and Testes descended bilaterally.   MSK:  Hips within normal range of motion. Spine straight.    EXT:  Warm, dry, without abnormalities.  NEURO:  Normal tone, bulk, strength.    ASSESSMENT:  16 month old male well child.    PLAN:    All parental concerns and questions discussed.    Anticipatory guidance  [de-identified] : Ms Jonas is a 45 year old woman who was diagnosed with a pT1cN1 stage 2a , ER + ID+ HER 2 nicole positive breast cancer in October 2017, after self palpating a lump in the left. She had breast imaging at [facility] in [place]. Left breast biopsy was done at [facility] in [facility]par \par She had neoadjuvant chemotherapy with Dr Pacheco, 11/2017 [facility]P had pCR  followed by a year completion of HP.  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 On b12 shots .  Bilateral mastectomies with reconstruction with Askikari at [facility] which revealed no evidence of residual disease. She underwent post-operative radiation therapy with Dr Purdy at [facility].  She underwent menopause during chemotherapy, and then menses resumed 2019.  She then started ovarian suppression with Zoladex in March of 2019. She  then began exemestane in 2019.  \par Hx of chest pain - [facility] \par \par Began Nerlynx in January of 2020 .  + diarrhea, intermittent toe infections.  She had several breaks in Nerlyx treatments because of infection and respiratory illness.  Tested negative for COVID. Total of 13 weeks of breaks from Nerlynx.  She believes due to complete late April 2021. currently on 4 pills a day \par \par There have been several post operative infections and complication requiring multiple reconstructive surgeries.  Chronic lymphedema of both arms, also has had several episodes of bilateral chest wall cellullitis.\par hx of uti on spressive nitrofuriton.  hx of high bp \par \par She is here to transfer care. \par \par Sees rheumatology. a lot of joint pains. \par \par PETCT negative for any mets or lesions jan 2021  provided, handout given.              Development              Diet - no change.              Accident prevention: childproof home, car seats, etc              Drink from cup              Analgesics/antipyretics for teething, vaccines              Dental care              Lead exposure risk: none    The patient/parents were counseled about each component of the following vaccines:    DTaP  Hib  Hepatitis A vaccine  Influenza Vaccine  This discussion included benefits and possible side effects.      Current VIS sheets provided as well for all vaccine given today.    RTC for 18 MO  WCE or sooner prn illness/concerns.       [de-identified] : \par poor sleep \par Recently had a toe infection\par Completed clindamycin for toe infection , was soaking toe also , not any better however in looking at her photos the erythema and induration are improved\par nerlynx on 2 imodium a day for diarrhea \par has appt with cardiology \par due for zoladex today  \par \par

## 2021-02-23 NOTE — REASON FOR VISIT
[Follow-Up Visit] : a follow-up [FreeTextEntry2] : clinical T1cN1a Stage IIa left breast cancer.,  Here for follow up and zoladex

## 2021-02-23 NOTE — ASSESSMENT
[FreeTextEntry1] :  THis is a 44 y/o woman with a hx of her positive er positive a pT1cN1 stage 2a  of left breast s/p neoadjuvant TCHP followed by bilateral mastecomty with node dissection , negative for residual disease , pCR. \par S/p 12 additional doses of Herceptin and perjeta. THen  radiation. Then started on nerlynx in jan 2020 . \par SHe also started on zoladex and exemestane in 2019. \par \par 1) breast cancer \par doing well overalll , DUYEN \par PET/CT negative for any evidence of metastases, discussed with patient at length\par Markers negative we will continue to monitor every 3 months\par proceed with zoladex and continue exemestane , again discussed possible switch to a different aromatase inhibitor due to the patient's joint pains but she continues to decline so for now we will continue with the exemestane\par cont nerlynx until april 2021 , hold for 1 week to allow for healing of the toe infection then resume\par \par 2) back pain \par PET/CT negative for metastases discussed with patient at length\par still sore but same not worse \par Exemestane may be contributing as is Zoladex, consider possible switch to a different AI patient would like to wait for now\par \par 3) lymphedema \par advise PT \par \par 4) chest pain \par echo ok \par PET/CT negative for any evidence of lung or chest wall or bony disease discussed with patient\par Given her family history of cardiac disease she absolutely needs to see a cardiologist, referred her to Dr. sukumar corey \par will see in march 2021 \par \par 5) diarrhea \par due to nerlynx , cont imodium \par advise colonoscopy given famly hx colon cancer will do once she completes the Nerlynx in April\par \par 6) obesity \par advise weight loss and exercise \par \par 7) chronic uti \par check ua and uc again today today\par \par 8) osteopenia \par Continue vitamin D and exercise\par dexa normal feb 2020  \par \par 9) vit d def \par cont vit d \par \par 10) cellultitis right toe \par see podiatry, there is not appear to be any active infection now so I have not prescribed any additional antibiotics but we will hold the Nerlynx for 1 week to allow the wound to heal more completely\par \par 11) joint pains \par likely due to exemestane \par Again discussed the possibility of changing to a different aromatase inhibitor the patient declines for the moment\par \par advised  gyn and colonosocpy \par \par Repeat labs today\par Follow-up in 1 month\par

## 2021-02-23 NOTE — REVIEW OF SYSTEMS
[SOB on Exertion] : shortness of breath during exertion [Diarrhea] : diarrhea [Hot Flashes] : hot flashes [Easy Bleeding] : a tendency for easy bleeding [Negative] : Psychiatric [Fever] : no fever [Chills] : no chills [Recent Change In Weight] : ~T no recent weight change [Shortness Of Breath] : no shortness of breath [Wheezing] : no wheezing [Cough] : no cough [Anxiety] : no anxiety [Easy Bruising] : no tendency for easy bruising [Swollen Glands] : no swollen glands [FreeTextEntry2] : hot flashes [FreeTextEntry4] : occasional spasms [FreeTextEntry7] : with Nerlynx, controlled with 2 Imodium daily [FreeTextEntry8] : frequent UTIs on Nerlynx, on prophylactic ABX [FreeTextEntry9] : Big toe infection on the right [de-identified] : numbness and tingling hands and feet - neuropathy

## 2021-02-24 ENCOUNTER — APPOINTMENT (OUTPATIENT)
Dept: HEMATOLOGY ONCOLOGY | Facility: CLINIC | Age: 46
End: 2021-02-24

## 2021-02-26 ENCOUNTER — NON-APPOINTMENT (OUTPATIENT)
Age: 46
End: 2021-02-26

## 2021-03-15 ENCOUNTER — APPOINTMENT (OUTPATIENT)
Dept: BREAST CENTER | Facility: CLINIC | Age: 46
End: 2021-03-15
Payer: COMMERCIAL

## 2021-03-15 VITALS
DIASTOLIC BLOOD PRESSURE: 90 MMHG | HEIGHT: 63 IN | WEIGHT: 196 LBS | SYSTOLIC BLOOD PRESSURE: 149 MMHG | BODY MASS INDEX: 34.73 KG/M2

## 2021-03-15 DIAGNOSIS — Z87.898 PERSONAL HISTORY OF OTHER SPECIFIED CONDITIONS: ICD-10-CM

## 2021-03-15 DIAGNOSIS — N63.20 UNSPECIFIED LUMP IN THE LEFT BREAST, UNSPECIFIED QUADRANT: ICD-10-CM

## 2021-03-15 PROCEDURE — 99214 OFFICE O/P EST MOD 30 MIN: CPT

## 2021-03-15 PROCEDURE — 99072 ADDL SUPL MATRL&STAF TM PHE: CPT

## 2021-03-15 NOTE — REVIEW OF SYSTEMS
[Recent Weight Gain (___ Lbs)] : recent [unfilled] ~Ulb weight gain [Diarrhea] : diarrhea [As Noted in HPI] : as noted in HPI [Joint Pain] : joint pain [Joint Swelling] : joint swelling [Joint Stiffness] : joint stiffness [Limb Pain] : limb pain [Breast Pain] : breast pain [Breast Lump] : breast lump [Negative] : Heme/Lymph

## 2021-03-15 NOTE — HISTORY OF PRESENT ILLNESS
[FreeTextEntry1] : The patient is a 45-year-old  still premenopausal white female of Sudanese, English, swished, Barbadian, [place], Spanish, and  descent.  She underwent menarche at age 12 and had her first child at age 32.  She has a strong family history of breast and ovarian cancer with her maternal great aunt who had ovarian cancer who passed away.  She has a maternal second cousin who had breast cancer in her late 40s.  Her paternal grandfather passed away from colorectal cancer at age 65.  Her maternal grandfather had colon cancer around age 75.  She has 4 maternal great uncles who had esophageal and throat cancer and a paternal great aunt who had uterine/cervical cancer.  A paternal great uncle had lung cancer.  The patient underwent bilateral augmentation implants in  requiring multiple replacements for ruptures last exchanged in .  She noticed a density towards the lateral aspect of the left breast and mammography showed suspicious left retroareolar calcifications.  Ultrasound showed multiple suspicious areas in the left breast 2:00, 3:00, as well as a suspicious left axillary lymph node.  Ultrasound-guided core biopsies on 2017 showed the left retroareolar density to be an intermediate grade invasive duct cancer with surrounding DCIS which was ER positive at 80%, OR negative, and HER-2 3+ by IHC.  A separate left breast 2:00 density 5 cm from the nipple turned out to be metastatic cancer and an intramammary lymph node which was ER/OR positive and HER-2 positive.  The left axillary suspicious lymph node was also positive.  She had clips placed on all the biopsied lesions as well as in a separate left breast 2:00 and 9:00 density which turned out to be a fibroadenoma and fibrocystic change.  An MRI performed on 2017 showed the right breast to be negative but the left breast showed extensive enhancement and PET scan showed no distant disease.  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  She underwent neoadjuvant TCHP chemotherapy through Dr. Pacheco.  MRI after neoadjuvant chemotherapy on 2018 showed no further enhancement.  I then performed bilateral total mastectomies to a Wise reduction pattern incision on April 10, 2018 and she had bilateral expander reconstructions with AlloDerm.  She had a sentinel lymph node biopsy with localization of the nodes preoperatively and both the intramammary as well as axillary lymph nodes were all negative.  She ended up with another 12 nodes removed which were negative since the localization was not perfectly placed.  The left mastectomy showed no residual cancer in the right mastectomy was negative.  She did undergo a small wound revision of the left breast by Dr. Nava on May 23, 2018.  She is following up with Dr. Pacheco and finished Herceptin and Perjeta and underwent postmastectomy radiation therapy at [facility] which finished on 2018.  She developed some redness in the left breast and was admitted to [place] and placed on IV vancomycin for 3 days and the redness resolved.  She developed bilateral upper extremity lymphedema for which she wears compression sleeves.  She developed redness over the left implant and was placed on Levaquin in 2018 and the redness improved.  She again was placed on Cipro in 2019 for some cellulitis over the right implant.  She underwent fat grafting and removal of her port in 2019 and then had her implants removed and exchanged by Dr. Ye in 2019 for cosmesis.  She now follows up with Dr. Holly Perrin and is continuing on Zoladex as well as Aromasin and is finishing out neratinib.  She did have some neuropathy and follows up with Dr. Stephen.  She comes in now for routine follow-up.

## 2021-03-15 NOTE — ASSESSMENT
[FreeTextEntry1] : The patient is a 45-year-old  still premenopausal white female of Danish, English, swished, Cypriot, Darlin, Albanian, and  descent.  She underwent menarche at age 12 and had her first child at age 32.  She has a strong family history of breast and ovarian cancer with her maternal great aunt who had ovarian cancer who passed away.  She has a maternal second cousin who had breast cancer in her late 40s.  Her paternal grandfather passed away from colorectal cancer at age 65.  Her maternal grandfather had colon cancer around age 75.  She has 4 maternal great uncles who had esophageal and throat cancer and a paternal great aunt who had uterine/cervical cancer.  A paternal great uncle had lung cancer.  The patient underwent bilateral augmentation implants in  requiring multiple replacements for ruptures last exchanged in .  She noticed a density towards the lateral aspect of the left breast and mammography showed suspicious left retroareolar calcifications.  Ultrasound showed multiple suspicious areas in the left breast 2:00, 3:00, as well as a suspicious left axillary lymph node.  Ultrasound-guided core biopsies on 2017 showed the left retroareolar density to be an intermediate grade invasive duct cancer with surrounding DCIS which was ER positive at 80%, IA negative, and HER-2 3+ by IHC.  A separate left breast 2:00 density 5 cm from the nipple turned out to be metastatic cancer and an intramammary lymph node which was ER/IA positive and HER-2 positive.  The left axillary suspicious lymph node was also positive.  She had clips placed on all the biopsied lesions as well as in a separate left breast 2:00 and 9:00 density which turned out to be a fibroadenoma and fibrocystic change.  An MRI performed on 2017 showed the right breast to be negative but the left breast showed extensive enhancement and PET scan showed no distant disease.  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  She underwent neoadjuvant TCHP chemotherapy through Dr. Pacheco.  MRI after neoadjuvant chemotherapy on 2018 showed no further enhancement.  I then performed bilateral total mastectomies to a Wise reduction pattern incision on April 10, 2018 and she had bilateral expander reconstructions with AlloDerm.  She had a sentinel lymph node biopsy with localization of the nodes preoperatively and both the intramammary as well as axillary lymph nodes were all negative.  She ended up with another 12 nodes removed which were negative since the localization was not perfectly placed.  The left mastectomy showed no residual cancer in the right mastectomy was negative.  She did undergo a small wound revision of the left breast by Dr. Nava on May 23, 2018.  She is following up with Dr. Pacheco and finished Herceptin and Perjeta and underwent postmastectomy radiation therapy at [facility] which finished on 2018.  She developed some redness in the left breast and was admitted to [place] and placed on IV vancomycin for 3 days and the redness resolved.  She developed bilateral upper extremity lymphedema for which she wears compression sleeves.  She developed redness over the left implant and was placed on Levaquin in 2018 and the redness improved.  She again was placed on Cipro in 2019 for some cellulitis over the right implant.  She underwent fat grafting and removal of her port in 2019 and then had her implants removed and exchanged by Dr. Ye in 2019 for cosmesis.  She now follows up with Dr. Holly Perrin and is continuing on Zoladex as well as Aromasin and is finishing out neratinib.  She did have some neuropathy and follows up with Dr. Stephen.  On exam, I cannot feel any suspicious densities over either implant.  She has stable bilateral arm lymphedema.  There is a small density felt over the upper inner aspect of the left implant which I believe is related to the skin but there were no suspicious findings on directed ultrasound.  I would like her to get an official ultrasound at radiology.  This is likely benign and I will see her again in May.  If she notices worsening of the density she is to call me for an earlier appointment.  Otherwise she should remain on Zoladex, Aromasin, and neratinib through Dr. Perrin.

## 2021-03-15 NOTE — PAST MEDICAL HISTORY
[Menarche Age ____] : age at menarche was [unfilled] [Menopause Age____] : age at menopause was [unfilled] [History of Hormone Replacement Treatment] : has a history of hormone replacement treatment [Total Preg ___] : G[unfilled] [Live Births ___] : P[unfilled]  [Age At Live Birth ___] : Age at live birth: [unfilled]

## 2021-03-15 NOTE — REASON FOR VISIT
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  The cancer was ER positive, MA negative, and HER-2 3+ and she underwent neoadjuvant TCHP chemotherapy through Dr. Pacheco.  She had bilateral total mastectomies through a Wise reduction pattern on April 10, 2018 and had bilateral expander reconstructions with AlloDerm.  Her nodes were all negative after neoadjuvant chemotherapy.  She finished Herceptin and Perjeta and had postmastectomy radiation and did develop some bouts of cellulitis and bilateral arm lymphedema.  She had her expanders exchanged for implants in 2019 and is currently on Zoladex, Aromasin, and is on neratinib and comes in for routine follow-up.

## 2021-03-15 NOTE — PHYSICAL EXAM
[Normocephalic] : normocephalic [Atraumatic] : atraumatic [EOMI] : extra ocular movement intact [Supple] : supple [No Supraclavicular Adenopathy] : no supraclavicular adenopathy [No Cervical Adenopathy] : no cervical adenopathy [Examined in the supine and seated position] : examined in the supine and seated position [Breast Mass Right Breast ___cm] : no masses [Breast Mass Left Breast ___cm] : no masses [___cm] : ~M [unfilled] ~Ucm upper inner quadrant mass [No Axillary Lymphadenopathy] : no left axillary lymphadenopathy [de-identified] : On exam, the patient has obvious bilateral mastectomies with implant reconstructions iwith a Nielsen pattern incision.  I cannot feel any suspicious densities over either implant.  She has no axillary, supraclavicular, or cervical adenopathy.  She does have this reddened slightly tender area in the upper inner aspect of the left implant and I performed a directed ultrasound but cannot see any suspicious findings.  I think this is just a superficial inflammation related to the skin. [de-identified] : Status post mastectomy and implant reconstruction with Wise pattern approach [de-identified] : Status post mastectomy and implant reconstruction with Wise pattern approach [de-identified] : Stable upper extremity mild lymphedema [de-identified] : Slight rash on upper inner aspect of left breast with superficial palpable density

## 2021-03-23 ENCOUNTER — APPOINTMENT (OUTPATIENT)
Dept: HEMATOLOGY ONCOLOGY | Facility: CLINIC | Age: 46
End: 2021-03-23
Payer: COMMERCIAL

## 2021-03-23 PROCEDURE — 99214 OFFICE O/P EST MOD 30 MIN: CPT

## 2021-03-23 PROCEDURE — 99072 ADDL SUPL MATRL&STAF TM PHE: CPT

## 2021-03-23 NOTE — HISTORY OF PRESENT ILLNESS
[de-identified] : Ms Jonas is a 45 year old woman who was diagnosed with a pT1cN1 stage 2a , ER + KS+ HER 2 nicole positive breast cancer in October 2017, after self palpating a lump in the left. She had breast imaging at [facility] in [place]. [place] [facility] [place] [facility] [place] \par She had neoadjuvant chemotherapy with Dr Pacheco, 11/2017 TCHP had pCR  followed by a year completion of HP.  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 On b12 shots .  Bilateral mastectomies with reconstruction with Askikari at [facility] which revealed no evidence of residual disease. She underwent post-operative radiation therapy with Dr Purdy at [facility].  She underwent menopause during chemotherapy, and then menses resumed 2019.  She then started ovarian suppression with Zoladex in March of 2019. She  then began exemestane in 2019.  \par Hx of chest pain - [facility] \par \par Began Nerlynx in January of 2020 .  + diarrhea, intermittent toe infections.  She had several breaks in Nerlyx treatments because of infection and respiratory illness.  Tested negative for COVID. Total of 13 weeks of breaks from Nerlynx.  She believes due to complete late April 2021. currently on 4 pills a day \par \par There have been several post operative infections and complication requiring multiple reconstructive surgeries.  Chronic lymphedema of both arms, also has had several episodes of bilateral chest wall cellullitis.\par hx of uti on spressive nitrofuriton.  hx of high bp \par \par She is here to transfer care. \par \par Sees rheumatology. a lot of joint pains. \par \par PETCT negative for any mets or lesions jan 2021  [de-identified] : \par saw podiatry , healed well , removed part of toe  thought was infections \par went back to see again , now better no antibotics \par got shakila and shakila shot \par urinary  symptoms \par nerlynx restarted - march 10th - diarrhea , imodium , nothing new \par saw dr corey , insurance denied cardiac CT , has to do a ct and stress test first (friday nuclear stress, then has second half tomorrow) \par thurs is ct scans \par felt a lump on right chest wall did ultraosund  negative \par rheumatologist on the next month \par here for zoladex \par \par

## 2021-03-23 NOTE — PHYSICAL EXAM
[Restricted in physically strenuous activity but ambulatory and able to carry out work of a light or sedentary nature] : Status 1- Restricted in physically strenuous activity but ambulatory and able to carry out work of a light or sedentary nature, e.g., light house work, office work [Normal] : affect appropriate [de-identified] : Left mastectomy, tiny rubbery 1 to 2 mm nodule in the inner upper portion of the left chest, right breast is unremarkable, no adenopathy [de-identified] : toe surgey

## 2021-03-23 NOTE — ASSESSMENT
[FreeTextEntry1] :  THis is a 46 y/o woman with a hx of her positive er positive a pT1cN1 stage 2a  of left breast s/p neoadjuvant TCHP followed by bilateral mastecomty with node dissection , negative for residual disease , pCR. \par S/p 12 additional doses of Herceptin and perjeta. THen  radiation. Then started on nerlynx in jan 2020 . \par SHe also started on zoladex and exemestane in 2019. \par \par 1) breast cancer \par doing well overalll , DUYEN \par PET/CT negative for any evidence of metastases, jan 2021 \par Markers negative we will continue to monitor every 3 months\par proceed with zoladex and continue exemestane\par Nodule on left chest wall is rubbery and movable and not overly suspicious, ultrasound was unrevealing.  I will monitor again next month when she comes in however she was encouraged to follow-up with Dr. Combs  if he feels that any additional imaging or biopsy is indicated, I did text him today to let him know that the ultrasound was completed, in case he did not see result \par cont nerlynx until april 2021 , to complete 1 full year which excludes numerous interuptions \par \par 2) back pain \par PET/CT negative for metastases discussed with patient at length\par stable \par repeat markers \par Exemestane may be contributing as is Zoladex, consider possible switch to a different AI patient would like to wait for now\par \par 3) lymphedema \par advise PT \par \par 4) chest pain \par echo ok \par PET/CT negative for any evidence of lung or chest wall or bony disease discussed with patient\par Status post follow-up with cardiology, will be going for a stress chest and numerous other exams patient will request that the reports be sent to me.\par \par 5) diarrhea \par due to nerlynx , cont imodium \par advise colonoscopy given famly hx colon cancer will do once she completes the Nerlynx in April\par \par 6) obesity \par advise weight loss and exercise \par \par 7) chronic uti \par check ua and uc again today today\par \par 8) osteopenia \par Continue vitamin D and exercise\par dexa normal feb 2020  \par \par 9) vit d def \par cont vit d \par \par 10) cellultitis right toe \par improving status post surgery with podiatry\par \par 11) joint pains \par likely due to exemestane \par Again discussed the possibility of changing to a different aromatase inhibitor the patient declines for the moment\par \par advised  gyn and colonosocpy \par \par Repeat labs today\par Follow-up in 1 month\par

## 2021-03-23 NOTE — REVIEW OF SYSTEMS
[SOB on Exertion] : shortness of breath during exertion [Diarrhea] : diarrhea [Hot Flashes] : hot flashes [Easy Bleeding] : a tendency for easy bleeding [Fever] : no fever [Chills] : no chills [Recent Change In Weight] : ~T no recent weight change [Shortness Of Breath] : no shortness of breath [Wheezing] : no wheezing [Cough] : no cough [Anxiety] : no anxiety [Easy Bruising] : no tendency for easy bruising [Swollen Glands] : no swollen glands [Negative] : ENT [FreeTextEntry2] : hot flashes [FreeTextEntry7] : diarrhea with Nerlynx, controlled with 2 Imodium daily, stable  [FreeTextEntry8] : frequent UTIs on Nerlynx, on prophylactic ABX [FreeTextEntry9] : s/p surgery right first toe  [de-identified] : numbness and tingling hands and feet - neuropathy

## 2021-03-30 ENCOUNTER — NON-APPOINTMENT (OUTPATIENT)
Age: 46
End: 2021-03-30

## 2021-04-01 ENCOUNTER — NON-APPOINTMENT (OUTPATIENT)
Age: 46
End: 2021-04-01

## 2021-04-09 ENCOUNTER — APPOINTMENT (OUTPATIENT)
Dept: BREAST CENTER | Facility: CLINIC | Age: 46
End: 2021-04-09
Payer: COMMERCIAL

## 2021-04-09 VITALS
WEIGHT: 196 LBS | DIASTOLIC BLOOD PRESSURE: 92 MMHG | SYSTOLIC BLOOD PRESSURE: 144 MMHG | HEIGHT: 63 IN | BODY MASS INDEX: 34.73 KG/M2

## 2021-04-09 DIAGNOSIS — L53.9 ERYTHEMATOUS CONDITION, UNSPECIFIED: ICD-10-CM

## 2021-04-09 PROCEDURE — 99213 OFFICE O/P EST LOW 20 MIN: CPT

## 2021-04-09 PROCEDURE — 99072 ADDL SUPL MATRL&STAF TM PHE: CPT

## 2021-04-09 NOTE — ASSESSMENT
[FreeTextEntry1] : The patient is a 45-year-old  still premenopausal white female of Serbian, English, swished, British, Darlin, Bengali, and  descent.  She underwent menarche at age 12 and had her first child at age 32.  She has a strong family history of breast and ovarian cancer with her maternal great aunt who had ovarian cancer who passed away.  She has a maternal second cousin who had breast cancer in her late 40s.  Her paternal grandfather passed away from colorectal cancer at age 65.  Her maternal grandfather had colon cancer around age 75.  She has 4 maternal great uncles who had esophageal and throat cancer and a paternal great aunt who had uterine/cervical cancer.  A paternal great uncle had lung cancer.  The patient underwent bilateral augmentation implants in  requiring multiple replacements for ruptures last exchanged in .  She noticed a density towards the lateral aspect of the left breast and mammography showed suspicious left retroareolar calcifications.  Ultrasound showed multiple suspicious areas in the left breast 2:00, 3:00, as well as a suspicious left axillary lymph node.  Ultrasound-guided core biopsies on 2017 showed the left retroareolar density to be an intermediate grade invasive duct cancer with surrounding DCIS which was ER positive at 80%, DC negative, and HER-2 3+ by IHC.  A separate left breast 2:00 density 5 cm from the nipple turned out to be metastatic cancer and an intramammary lymph node which was ER/DC positive and HER-2 positive.  The left axillary suspicious lymph node was also positive.  She had clips placed on all the biopsied lesions as well as in a separate left breast 2:00 and 9:00 density which turned out to be a fibroadenoma and fibrocystic change.  An MRI performed on 2017 showed the right breast to be negative but the left breast showed extensive enhancement and PET scan showed no distant disease.  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  She underwent neoadjuvant TCHP chemotherapy through Dr. Pacheco.  MRI after neoadjuvant chemotherapy on 2018 showed no further enhancement.  I then performed bilateral total mastectomies to a Wise reduction pattern incision on April 10, 2018 and she had bilateral expander reconstructions with AlloDerm.  She had a sentinel lymph node biopsy with localization of the nodes preoperatively and both the intramammary as well as axillary lymph nodes were all negative.  She ended up with another 12 nodes removed which were negative since the localization was not perfectly placed.  The left mastectomy showed no residual cancer in the right mastectomy was negative.  She did undergo a small wound revision of the left breast by Dr. Nava on May 23, 2018.  She is following up with Dr. Pacheco and finished Herceptin and Perjeta and underwent postmastectomy radiation therapy at [facility] which finished on 2018.  She developed some redness in the left breast and was admitted to [place] and placed on IV vancomycin for 3 days and the redness resolved.  She developed bilateral upper extremity lymphedema for which she wears compression sleeves.  She developed redness over the left implant and was placed on Levaquin in 2018 and the redness improved.  She again was placed on Cipro in 2019 for some cellulitis over the right implant.  She underwent fat grafting and removal of her port in 2019 and then had her implants removed and exchanged by Dr. Ye in 2019 for cosmesis.  She now follows up with Dr. Holly Perrin and is continuing on Zoladex as well as Aromasin and is finishing out neratinib.  She did have some neuropathy and follows up with Dr. Stephen.  On exam, I cannot feel any suspicious densities over either implant.  She has stable bilateral arm lymphedema.  There was a small density felt over the upper inner aspect of the left implant and I sent her for directed left breast ultrasound which was performed on 3/22/2021 showing no suspicious findings in that area.  Her redness has since improved and the density is no longer palpable.  She was reassured and can just follow-up again in 6 months.  She can give me a call back if she notices any worsening redness or return of any mass. Otherwise she should remain on Zoladex, Aromasin, and neratinib through Dr. Kay.

## 2021-04-09 NOTE — REASON FOR VISIT
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  The cancer was ER positive, WA negative, and HER-2 3+ and she underwent neoadjuvant TCHP chemotherapy through Dr. Pacheco.  She had bilateral total mastectomies through a Wise reduction pattern on April 10, 2018 and had bilateral expander reconstructions with AlloDerm.  Her nodes were all negative after neoadjuvant chemotherapy.  She finished Herceptin and Perjeta and had postmastectomy radiation and did develop some bouts of cellulitis and bilateral arm lymphedema.  She had her expanders exchanged for implants in 2019 and is currently on Zoladex, Aromasin, and is on neratinib and comes in for an interval follow-up of some left upper inner quadrant breast redness.

## 2021-04-09 NOTE — REVIEW OF SYSTEMS
[Negative] : Heme/Lymph [Recent Weight Gain (___ Lbs)] : recent [unfilled] ~Ulb weight gain [Diarrhea] : diarrhea [As Noted in HPI] : as noted in HPI [Joint Pain] : joint pain [Joint Swelling] : joint swelling [Joint Stiffness] : joint stiffness [Limb Pain] : limb pain [Breast Pain] : breast pain [Breast Lump] : breast lump

## 2021-04-09 NOTE — PHYSICAL EXAM
[Normocephalic] : normocephalic [Atraumatic] : atraumatic [EOMI] : extra ocular movement intact [Supple] : supple [No Supraclavicular Adenopathy] : no supraclavicular adenopathy [No Cervical Adenopathy] : no cervical adenopathy [Examined in the supine and seated position] : examined in the supine and seated position [No dominant masses] : no dominant masses in right breast  [No dominant masses] : no dominant masses left breast [Breast Mass Right Breast ___cm] : no masses [Breast Mass Left Breast ___cm] : no masses [___cm] : ~M [unfilled] ~Ucm upper inner quadrant mass [No Axillary Lymphadenopathy] : no left axillary lymphadenopathy [de-identified] : On exam, the patient has obvious bilateral mastectomies with implant reconstructions with a Nielsen pattern incision.  I cannot feel any suspicious densities over either implant.  She has no axillary, supraclavicular, or cervical adenopathy.  She does have this reddened slightly tender area in the upper inner aspect of the left implant which seems to be improved since her last visit about a month ago [de-identified] : Status post mastectomy and implant reconstruction with Wise pattern approach [de-identified] : Status post mastectomy and implant reconstruction with Wise pattern approach.  Improving redness over upper aspect of the left implant with resolution of density [de-identified] : Stable upper extremity mild lymphedema [de-identified] : Slight rash on upper inner aspect of left breast which has improved

## 2021-04-09 NOTE — HISTORY OF PRESENT ILLNESS
[FreeTextEntry1] : The patient is a 45-year-old  still premenopausal white female of Albanian, English, swished, Pakistani, Darlin, Kazakh, and  descent.  She underwent menarche at age 12 and had her first child at age 32.  She has a strong family history of breast and ovarian cancer with her maternal great aunt who had ovarian cancer who passed away.  She has a maternal second cousin who had breast cancer in her late 40s.  Her paternal grandfather passed away from colorectal cancer at age 65.  Her maternal grandfather had colon cancer around age 75.  She has 4 maternal great uncles who had esophageal and throat cancer and a paternal great aunt who had uterine/cervical cancer.  A paternal great uncle had lung cancer.  The patient underwent bilateral augmentation implants in  requiring multiple replacements for ruptures last exchanged in .  She noticed a density towards the lateral aspect of the left breast and mammography showed suspicious left retroareolar calcifications.  Ultrasound showed multiple suspicious areas in the left breast 2:00, 3:00, as well as a suspicious left axillary lymph node.  Ultrasound-guided core biopsies on 2017 showed the left retroareolar density to be an intermediate grade invasive duct cancer with surrounding DCIS which was ER positive at 80%, FL negative, and HER-2 3+ by IHC.  A separate left breast 2:00 density 5 cm from the nipple turned out to be metastatic cancer and an intramammary lymph node which was ER/FL positive and HER-2 positive.  The left axillary suspicious lymph node was also positive.  She had clips placed on all the biopsied lesions as well as in a separate left breast 2:00 and 9:00 density which turned out to be a fibroadenoma and fibrocystic change.  An MRI performed on 2017 showed the right breast to be negative but the left breast showed extensive enhancement and PET scan showed no distant disease.  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  She underwent neoadjuvant TCHP chemotherapy through Dr. Pacheco.  MRI after neoadjuvant chemotherapy on 2018 showed no further enhancement.  I then performed bilateral total mastectomies to a Wise reduction pattern incision on April 10, 2018 and she had bilateral expander reconstructions with AlloDerm.  She had a sentinel lymph node biopsy with localization of the nodes preoperatively and both the intramammary as well as axillary lymph nodes were all negative.  She ended up with another 12 nodes removed which were negative since the localization was not perfectly placed.  The left mastectomy showed no residual cancer in the right mastectomy was negative.  She did undergo a small wound revision of the left breast by Dr. Nava on May 23, 2018.  She is following up with Dr. Pacheco and finished Herceptin and Perjeta and underwent postmastectomy radiation therapy at [facility] which finished on 2018.  She developed some redness in the left breast and was admitted to [facility] and placed on IV vancomycin for 3 days and the redness resolved.  She developed bilateral upper extremity lymphedema for which she wears compression sleeves.  She developed redness over the left implant and was placed on Levaquin in 2018 and the redness improved.  She again was placed on Cipro in 2019 for some cellulitis over the right implant.  She underwent fat grafting and removal of her port in 2019 and then had her implants removed and exchanged by Dr. Ye in 2019 for cosmesis.  She now follows up with Dr. Holly Perrin and is continuing on Zoladex as well as Aromasin and is finishing out neratinib.  She did have some neuropathy and follows up with Dr. Stephen.  She comes in now for an interval follow-up due to some redness on the upper inner aspect over the left implant.

## 2021-04-09 NOTE — PAST MEDICAL HISTORY
[Menarche Age ____] : age at menarche was [unfilled] [Menopause Age____] : age at menopause was [unfilled] [Total Preg ___] : G[unfilled] [Live Births ___] : P[unfilled]  [Age At Live Birth ___] : Age at live birth: [unfilled] [History of Hormone Replacement Treatment] : has a history of hormone replacement treatment

## 2021-04-22 ENCOUNTER — APPOINTMENT (OUTPATIENT)
Dept: HEMATOLOGY ONCOLOGY | Facility: CLINIC | Age: 46
End: 2021-04-22
Payer: COMMERCIAL

## 2021-04-22 ENCOUNTER — LABORATORY RESULT (OUTPATIENT)
Age: 46
End: 2021-04-22

## 2021-04-22 VITALS
HEART RATE: 82 BPM | WEIGHT: 198 LBS | OXYGEN SATURATION: 98 % | RESPIRATION RATE: 17 BRPM | BODY MASS INDEX: 35.08 KG/M2 | TEMPERATURE: 98.1 F | HEIGHT: 63 IN | DIASTOLIC BLOOD PRESSURE: 97 MMHG | SYSTOLIC BLOOD PRESSURE: 146 MMHG

## 2021-04-22 PROCEDURE — 99214 OFFICE O/P EST MOD 30 MIN: CPT

## 2021-04-22 PROCEDURE — 99072 ADDL SUPL MATRL&STAF TM PHE: CPT

## 2021-04-22 NOTE — HISTORY OF PRESENT ILLNESS
[de-identified] : Ms Jonas is a 45 year old woman who was diagnosed with a pT1cN1 stage 2a , ER + IN+ HER 2 nicole positive breast cancer in October 2017, after self palpating a lump in the left. She had breast imaging at [facility] in [place]. Left breast biopsy was done at [facility] in [facility].\par \par She had neoadjuvant chemotherapy with Dr Pacheco, 11/2017 TCHP had pCR  followed by a year completion of HP.  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 On b12 shots .  Bilateral mastectomies with reconstruction with Askikari at [facility] which revealed no evidence of residual disease. She underwent post-operative radiation therapy with Dr Purdy at [facility].  She underwent menopause during chemotherapy, and then menses resumed 2019.  She then started ovarian suppression with Zoladex in March of 2019. She  then began exemestane in 2019.  \par Hx of chest pain - [facility] \par \par Began Nerlynx in January of 2020 .  + diarrhea, intermittent toe infections.  She had several breaks in Nerlyx treatments because of infection and respiratory illness.  Tested negative for COVID. Total of 13 weeks of breaks from Nerlynx.  She believes due to complete late April 2021. currently on 4 pills a day \par \par There have been several post operative infections and complication requiring multiple reconstructive surgeries.  Chronic lymphedema of both arms, also has had several episodes of bilateral chest wall cellullitis.\par hx of uti on spressive nitrofuriton.  hx of high bp \par \par She is here to transfer care. \par \par Sees rheumatology. a lot of joint pains. \par \par PETCT negative for any mets or lesions jan 2021  [de-identified] : saw podiatry, healed well , removed part of toe  thought was infections .\par got shakila and shakila shot on right arm\par still feels a lump on right chest anterior aspect. Saw Dr. Combs \par urinary symptoms still having frequency  on macrobid\par nerlynx to be continued until 05/2021- pt will have reached a full year due to stopping  intermittantly \par cont to have diarrhea \par had stress test/ cardiac cath/ ECHO. per patient, "everything was good." \par had a horrible experience with Delfin post cath\par rheumatologist rescheduled for 4/30/2021. \par wants to find a dermatologist, concerned about a growing lesion in the inner aspect of her right eyelid. Has had it for 1.5 years \par here for zoladex \par \par

## 2021-04-22 NOTE — PHYSICAL EXAM
[Restricted in physically strenuous activity but ambulatory and able to carry out work of a light or sedentary nature] : Status 1- Restricted in physically strenuous activity but ambulatory and able to carry out work of a light or sedentary nature, e.g., light house work, office work [Normal] : affect appropriate [de-identified] : i [de-identified] : Left mastectomy, tiny rubbery 2 mm nodule in the inner upper portion of the left chest, right breast is unremarkable,  nodule near port scar measuring 3-4mm  small node in lefft axilla none on right [de-identified] : toe surgey  [de-identified] : irregular papular 0.2cm lesion noted to inner cantus of R eye lid. no drainage or erythema

## 2021-04-22 NOTE — ASSESSMENT
[FreeTextEntry1] :  THis is a 44 y/o woman with a hx of her positive er positive a pT1cN1 stage 2a  of left breast s/p neoadjuvant TCHP followed by bilateral mastecomty with node dissection , negative for residual disease , pCR. \par S/p 12 additional doses of Herceptin and perjeta. THen  radiation. Then started on nerlynx in jan 2020 . \par SHe also started on zoladex and exemestane in 2019. \par \par 1) breast cancer \par doing well overalll , DUYEN \par PET/CT negative for any evidence of metastases, jan 2021 \par Markers negative we will continue to monitor every 3 months\par proceed with zoladex and continue exemestane, overall tolerating well \par cont having intermittent tiny nodule in the left chest of unclear etiology, this did not does not appear to be growing significantly.  Patient will let Dr. Fidencio Anaya know however I will be following up with her next month to monitor the size.\par In addition she has a nodular area near Port-A-Cath scar which is also likely not significant but will continue to monitor and check at next visit\par cont nerlynx until end of may 2021 ( due to intereptions) \par \par 2) back pain \par PET/CT negative for metastases discussed with patient at length\par stable , no increase \par repeat markers \par Continue exemestane, patient declines switch at this point until she is stable after completing the Nerlynx\par \par 3) lymphedema \par advise PT \par \par 4) chest pain \par echo ok \par PET/CT negative for any evidence of lung or chest wall or bony disease discussed with patient\par s/p extensive cardiac evaluation , get records fan \par \par 5) diarrhea \par due to nerlynx , cont imodium \par advise colonoscopy given famly hx colon cancer will do once she completes the Nerlynx in April\par \par 6) obesity \par advise weight loss and exercise \par \par 7) chronic uti \par check ua and uc again today today\par \par 8) osteopenia \par Continue vitamin D and exercise\par dexa normal feb 2020  \par \par 9) vit d def \par cont vit d \par \par 10) cellultitis right toe \par improving status post surgery with podiatry. \par \par 11) joint pains \par likely due to exemestane \par Again discussed the possibility of changing to a different aromatase inhibitor\par pt cont to decline \par \par advised gyn and colonosocpy \par \par Repeat labs today\par Follow-up in 1 month\par

## 2021-04-22 NOTE — REASON FOR VISIT
[Follow-Up Visit] : a follow-up [FreeTextEntry2] : clinical T1cN1a Stage IIa left breast cancer.,  Here for tox check on nerlynx and zoladex

## 2021-04-22 NOTE — REVIEW OF SYSTEMS
[SOB on Exertion] : shortness of breath during exertion [Diarrhea] : diarrhea [Hot Flashes] : hot flashes [Easy Bleeding] : a tendency for easy bleeding [Negative] : Psychiatric [Fever] : no fever [Chills] : no chills [Recent Change In Weight] : ~T no recent weight change [Shortness Of Breath] : shortness of breath [Wheezing] : no wheezing [Cough] : no cough [Anxiety] : no anxiety [Easy Bruising] : no tendency for easy bruising [Swollen Glands] : no swollen glands [FreeTextEntry2] : hot flashes [FreeTextEntry7] : diarrhea only  with Nerlynx, controlled with 2 Imodium daily, stable  [FreeTextEntry8] : frequent UTIs on Nerlynx, on prophylactic ABX [FreeTextEntry9] : s/p surgery right first toe  [de-identified] : numbness and tingling hands and feet - neuropathy

## 2021-04-25 LAB
25(OH)D3 SERPL-MCNC: 19.6 NG/ML
APPEARANCE: CLEAR
BILIRUBIN URINE: NEGATIVE
BLOOD URINE: NEGATIVE
COLOR: NORMAL
GLUCOSE QUALITATIVE U: NEGATIVE
KETONES URINE: NEGATIVE
LEUKOCYTE ESTERASE URINE: ABNORMAL
NITRITE URINE: NEGATIVE
PH URINE: 7
PROTEIN URINE: NEGATIVE
SPECIFIC GRAVITY URINE: 1.01
UROBILINOGEN URINE: NORMAL

## 2021-04-27 ENCOUNTER — NON-APPOINTMENT (OUTPATIENT)
Age: 46
End: 2021-04-27

## 2021-05-07 ENCOUNTER — NON-APPOINTMENT (OUTPATIENT)
Age: 46
End: 2021-05-07

## 2021-05-07 ENCOUNTER — APPOINTMENT (OUTPATIENT)
Dept: BREAST CENTER | Facility: CLINIC | Age: 46
End: 2021-05-07

## 2021-05-07 ENCOUNTER — APPOINTMENT (OUTPATIENT)
Dept: BREAST CENTER | Facility: CLINIC | Age: 46
End: 2021-05-07
Payer: COMMERCIAL

## 2021-05-07 VITALS
DIASTOLIC BLOOD PRESSURE: 92 MMHG | SYSTOLIC BLOOD PRESSURE: 134 MMHG | BODY MASS INDEX: 35.26 KG/M2 | HEIGHT: 63 IN | OXYGEN SATURATION: 96 % | WEIGHT: 199 LBS | HEART RATE: 94 BPM

## 2021-05-07 PROCEDURE — 99072 ADDL SUPL MATRL&STAF TM PHE: CPT

## 2021-05-07 PROCEDURE — 99213 OFFICE O/P EST LOW 20 MIN: CPT

## 2021-05-07 NOTE — PAST MEDICAL HISTORY
[Menarche Age ____] : age at menarche was [unfilled] [Menopause Age____] : age at menopause was [unfilled] [Total Preg ___] : G[unfilled] [Live Births ___] : P[unfilled]  [Abortions ___] : Abortions:[unfilled] [Age At Live Birth ___] : Age at live birth: [unfilled] [History of Hormone Replacement Treatment] : has a history of hormone replacement treatment

## 2021-05-07 NOTE — PHYSICAL EXAM
[Normocephalic] : normocephalic [Atraumatic] : atraumatic [EOMI] : extra ocular movement intact [Supple] : supple [No Supraclavicular Adenopathy] : no supraclavicular adenopathy [No Cervical Adenopathy] : no cervical adenopathy [Examined in the supine and seated position] : examined in the supine and seated position [No dominant masses] : no dominant masses in right breast  [No dominant masses] : no dominant masses left breast [Breast Mass Right Breast ___cm] : no masses [___cm] : ~M [unfilled] ~Ucm upper inner quadrant mass [Breast Mass Left Breast ___cm] : no masses [No Axillary Lymphadenopathy] : no left axillary lymphadenopathy [No Rashes] : no rashes [No Ulceration] : no ulceration [de-identified] : On exam, the patient has obvious bilateral mastectomies with implant reconstructions with a Nielsen pattern incision.  I cannot feel any suspicious densities over either implant.  She does have a slightly enlarged lymph node felt higher up in the right axilla but this is fairly soft and slightly tender.  She did get her COVID vaccination second dose in March 2021.  She has no supraclavicular, or cervical adenopathy.  Her left skin redness has improved and the previous medial density is no longer palpable. [de-identified] : Status post mastectomy and implant reconstruction with Wise pattern approach [de-identified] : Status post mastectomy and implant reconstruction with Wise pattern approach.  Improved redness over upper aspect of the left implant with resolution of density [de-identified] : Soft slightly enlarged right axillary lymph node [de-identified] : Stable upper extremity mild lymphedema

## 2021-05-07 NOTE — REASON FOR VISIT
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  The cancer was ER positive, CT negative, and HER-2 3+ and she underwent neoadjuvant TCHP chemotherapy through Dr. Pacheco.  She had bilateral total mastectomies through a Wise reduction pattern on April 10, 2018 and had bilateral expander reconstructions with AlloDerm.  Her nodes were all negative after neoadjuvant chemotherapy.  She finished Herceptin and Perjeta and had postmastectomy radiation and did develop some bouts of cellulitis and bilateral arm lymphedema.  She had her expanders exchanged for implants in 2019 and is currently on Zoladex, Aromasin, and is on neratinib and comes in for an interval follow-up with a questionable palpable right axillary lymph node felt by her medical oncologist.

## 2021-05-07 NOTE — REVIEW OF SYSTEMS
[Feeling Poorly] : feeling poorly [Feeling Tired] : feeling tired [Recent Weight Gain (___ Lbs)] : recent [unfilled] ~Ulb weight gain [Nasal Discharge] : nasal discharge [Sore Throat] : sore throat [Hoarseness] : hoarseness [Chest Pain] : chest pain [Shortness Of Breath] : shortness of breath [Diarrhea] : diarrhea [Joint Pain] : joint pain [Joint Swelling] : joint swelling [Joint Stiffness] : joint stiffness [Limb Pain] : limb pain [Breast Lump] : breast lump [Hot Flashes] : hot flashes [As Noted in HPI] : as noted in HPI [Breast Pain] : breast pain [Negative] : Endocrine [FreeTextEntry8] : burning urination

## 2021-05-07 NOTE — HISTORY OF PRESENT ILLNESS
[FreeTextEntry1] : The patient is a 45-year-old  still premenopausal white female of Bulgarian, English, swished, Afghan, Darlin, Irish, and  descent.  She underwent menarche at age 12 and had her first child at age 32.  She has a strong family history of breast and ovarian cancer with her maternal great aunt who had ovarian cancer who passed away.  She has a maternal second cousin who had breast cancer in her late 40s.  Her paternal grandfather passed away from colorectal cancer at age 65.  Her maternal grandfather had colon cancer around age 75.  She has 4 maternal great uncles who had esophageal and throat cancer and a paternal great aunt who had uterine/cervical cancer.  A paternal great uncle had lung cancer.  The patient underwent bilateral augmentation implants in  requiring multiple replacements for ruptures last exchanged in .  She noticed a density towards the lateral aspect of the left breast and mammography showed suspicious left retroareolar calcifications.  Ultrasound showed multiple suspicious areas in the left breast 2:00, 3:00, as well as a suspicious left axillary lymph node.  Ultrasound-guided core biopsies on 2017 showed the left retroareolar density to be an intermediate grade invasive duct cancer with surrounding DCIS which was ER positive at 80%, AK negative, and HER-2 3+ by IHC.  A separate left breast 2:00 density 5 cm from the nipple turned out to be metastatic cancer and an intramammary lymph node which was ER/AK positive and HER-2 positive.  The left axillary suspicious lymph node was also positive.  She had clips placed on all the biopsied lesions as well as in a separate left breast 2:00 and 9:00 density which turned out to be a fibroadenoma and fibrocystic change.  An MRI performed on 2017 showed the right breast to be negative but the left breast showed extensive enhancement and PET scan showed no distant disease.  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  She underwent neoadjuvant TCHP chemotherapy through Dr. Pacheco.  MRI after neoadjuvant chemotherapy on 2018 showed no further enhancement.  I then performed bilateral total mastectomies to a Wise reduction pattern incision on April 10, 2018 and she had bilateral expander reconstructions with AlloDerm.  She had a sentinel lymph node biopsy with localization of the nodes preoperatively and both the intramammary as well as axillary lymph nodes were all negative.  She ended up with another 12 nodes removed which were negative since the localization was not perfectly placed.  The left mastectomy showed no residual cancer in the right mastectomy was negative.  She did undergo a small wound revision of the left breast by Dr. Nava on May 23, 2018.  She is following up with Dr. Pacheco and finished Herceptin and Perjeta and underwent postmastectomy radiation therapy at [facility] which finished on 2018.  She developed some redness in the left breast and was admitted to [place] and placed on IV vancomycin for 3 days and the redness resolved.  She developed bilateral upper extremity lymphedema for which she wears compression sleeves.  She developed redness over the left implant and was placed on Levaquin in 2018 and the redness improved.  She again was placed on Cipro in 2019 for some cellulitis over the right implant.  She underwent fat grafting and removal of her port in 2019 and then had her implants removed and exchanged by Dr. Ye in 2019 for cosmesis.  She now follows up with Dr. Holly Walker and is continuing on Zoladex as well as Aromasin and is finishing out neratinib.  She did have some neuropathy and follows up with Dr. Stephen.  She comes in now for an interval follow-up due to questionable lymph node felt in her lower right axilla.

## 2021-05-10 ENCOUNTER — NON-APPOINTMENT (OUTPATIENT)
Age: 46
End: 2021-05-10

## 2021-05-21 ENCOUNTER — LABORATORY RESULT (OUTPATIENT)
Age: 46
End: 2021-05-21

## 2021-05-21 ENCOUNTER — APPOINTMENT (OUTPATIENT)
Dept: HEMATOLOGY ONCOLOGY | Facility: CLINIC | Age: 46
End: 2021-05-21
Payer: COMMERCIAL

## 2021-05-21 VITALS
RESPIRATION RATE: 18 BRPM | WEIGHT: 200 LBS | DIASTOLIC BLOOD PRESSURE: 95 MMHG | BODY MASS INDEX: 35.44 KG/M2 | SYSTOLIC BLOOD PRESSURE: 140 MMHG | OXYGEN SATURATION: 98 % | HEIGHT: 63 IN | HEART RATE: 96 BPM | TEMPERATURE: 98.4 F

## 2021-05-21 DIAGNOSIS — Z80.3 FAMILY HISTORY OF MALIGNANT NEOPLASM OF BREAST: ICD-10-CM

## 2021-05-21 DIAGNOSIS — Z87.09 PERSONAL HISTORY OF OTHER DISEASES OF THE RESPIRATORY SYSTEM: ICD-10-CM

## 2021-05-21 DIAGNOSIS — Z98.890 OTHER SPECIFIED POSTPROCEDURAL STATES: ICD-10-CM

## 2021-05-21 DIAGNOSIS — Z87.898 PERSONAL HISTORY OF OTHER SPECIFIED CONDITIONS: ICD-10-CM

## 2021-05-21 PROCEDURE — 99214 OFFICE O/P EST MOD 30 MIN: CPT

## 2021-05-21 PROCEDURE — 99072 ADDL SUPL MATRL&STAF TM PHE: CPT

## 2021-05-21 NOTE — REVIEW OF SYSTEMS
[Shortness Of Breath] : shortness of breath [SOB on Exertion] : shortness of breath during exertion [Diarrhea] : diarrhea [Hot Flashes] : hot flashes [Easy Bleeding] : a tendency for easy bleeding [Negative] : Psychiatric [Fever] : no fever [Chills] : no chills [Night Sweats] : no night sweats [Fatigue] : no fatigue [Recent Change In Weight] : ~T no recent weight change [Wheezing] : no wheezing [Cough] : no cough [Anxiety] : no anxiety [Easy Bruising] : no tendency for easy bruising [Swollen Glands] : no swollen glands [FreeTextEntry4] : see interval hx [FreeTextEntry7] : see interval hx [FreeTextEntry8] : frequent UTIs on Nerlynx, on prophylactic ABX [FreeTextEntry9] : see interval hx [de-identified] : numbness and tingling hands and feet - neuropathy

## 2021-05-21 NOTE — ASSESSMENT
[FreeTextEntry1] :  THis is a 44 y/o woman with a hx of her positive er positive a pT1cN1 stage 2a  of left breast s/p neoadjuvant TCHP followed by bilateral mastecomty with node dissection , negative for residual disease , pCR. \par S/p 12 additional doses of Herceptin and perjeta. THen  radiation. Then started on nerlynx in jan 2020 . \par SHe also started on zoladex and exemestane in 2019. \par \par 1) breast cancer \par doing well overalll , DUYEN \par PET/CT negative for any evidence of metastases, jan 2021 \par Markers negative we will continue to monitor every 3 months\par proceed with zoladex , discussed possible ovary removal in future \par cont exemestane - discussed possible switch to diff ai due to side effects patient declinds for now \par chest wall nodule resolved \par s/p follow up dr gracia \par cont nerlynx until end of this month ( complete 1 year - with interuptions) \par \par 2) back pain \par PET/CT negative for metastases discussed with patient at length\par stable , no increase \par repeat markers \par Continue exemestane, patient declines switch at this point until she is stable after completing the Nerlynx\par \par 3) lymphedema \par cont PT \par \par 4) chest pain \par echo ok \par PET/CT negative for any evidence of lung or chest wall or bony disease discussed with patient\par s/p extensive cardiac evaluation , get records fan \par \par 5) diarrhea \par due to nerlynx , cont imodium \par advise colonoscopy given famly hx colon cancer will do once she completes the Nerlynx in April\par \par 6) obesity \par advise weight loss and exercise \par \par 7) chronic uti \par check ua and uc again today today\par \par 8) osteopenia \par Continue vitamin D and exercise\par dexa normal feb 2020  \par \par 9) vit d def \par cont vit d \par \par 10) cellultitis right toe \par improved status post surgery with podiatry. \par \par 11) joint pains \par likely due to exemestane \par discussed switch to diff ai \par patient declines for now \par \par 12) headache \par check esr \par if fails to resolve will get MRI \par \par advised gyn and colonosocpy \par \par Repeat labs today\par Follow-up in 1 month\par

## 2021-05-21 NOTE — HISTORY OF PRESENT ILLNESS
[de-identified] : Ms Jonas is a 45 year old woman who was diagnosed with a pT1cN1 stage 2a , ER + MI+ HER 2 nicole positive breast cancer in October 2017, after self palpating a lump in the left. She had breast imaging at [facility] in [place]. Left breast biopsy was done at [facility] in [facility].\par \par She had neoadjuvant chemotherapy with Dr Pacheco, 11/2017 [facility] had pCR  followed by a year completion of HP.  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 On b12 shots .  Bilateral mastectomies with reconstruction with Askikari at [facility] which revealed no evidence of residual disease. She underwent post-operative radiation therapy with Dr Purdy at [facility].  She underwent menopause during chemotherapy, and then menses resumed 2019.  She then started ovarian suppression with Zoladex in March of 2019. She  then began exemestane in 2019.  \par Hx of chest pain - [facility] \par \par Began Nerlynx in January of 2020 .  + diarrhea, intermittent toe infections.  She had several breaks in Nerlyx treatments because of infection and respiratory illness.  Tested negative for COVID. Total of 13 weeks of breaks from Nerlynx.  She believes due to complete late April 2021. currently on 4 pills a day \par \par There have been several post operative infections and complication requiring multiple reconstructive surgeries.  Chronic lymphedema of both arms, also has had several episodes of bilateral chest wall cellullitis.\par hx of uti on spressive nitrofuriton.  hx of high bp \par \par She is here to transfer care. \par \par Sees rheumatology. a lot of joint pains. \par \par PETCT negative for any mets or lesions jan 2021  [de-identified] : overall, doing ok. \par saw Dr. Combs- did R axilla US due to palpable lymph node ( was not sure if this was r/t J&J shot)\par will see Derm in 07/2021 for Left eye lid lesion\par tolerating nerlynx- will complete end of this month \par doing ok on exemestane- does not want to change to another AI. wants to wait a little bit more\par chest pain is better\par going for physical therapy \par had intermittent sharp pain noted to Left upper eye x 10 days. no visual changes. \par R toe dull pain from previous cellulitis- better but still discolored\par left foot burning sensation noted to dorsal aspect. \par saw Dr. Murray did XR of ankles and feet. + calcaneal spur\par due for zoladex today\par due for gyn and colonscopy.

## 2021-05-21 NOTE — PHYSICAL EXAM
[Restricted in physically strenuous activity but ambulatory and able to carry out work of a light or sedentary nature] : Status 1- Restricted in physically strenuous activity but ambulatory and able to carry out work of a light or sedentary nature, e.g., light house work, office work [Normal] : affect appropriate [de-identified] : left breast s/p mastectomy. R breast normal no LN palpated to R axilla , nodule in left chest wall no longer appreciated  [de-identified] : irregular papular 0.2cm lesion noted to inner cantus of R eye lid. no drainage or erythema. no point tenderness to left big toe

## 2021-05-21 NOTE — REASON FOR VISIT
[Follow-Up Visit] : a follow-up [FreeTextEntry2] : clinical T1cN1a Stage IIa left breast cancer.,  Follow up on  nerlynx and zoladex  and exemestane

## 2021-05-23 LAB
BACTERIA UR CULT: NORMAL
FERRITIN SERPL-MCNC: 62 NG/ML
FOLATE SERPL-MCNC: >20 NG/ML
IRON SATN MFR SERPL: 44 %
IRON SERPL-MCNC: 134 UG/DL
TIBC SERPL-MCNC: 307 UG/DL
UIBC SERPL-MCNC: 173 UG/DL
VIT B12 SERPL-MCNC: 848 PG/ML

## 2021-05-24 LAB — CRP SERPL-MCNC: <3 MG/L

## 2021-06-18 ENCOUNTER — APPOINTMENT (OUTPATIENT)
Dept: HEMATOLOGY ONCOLOGY | Facility: CLINIC | Age: 46
End: 2021-06-18
Payer: COMMERCIAL

## 2021-06-18 VITALS
RESPIRATION RATE: 18 BRPM | HEIGHT: 63 IN | SYSTOLIC BLOOD PRESSURE: 132 MMHG | WEIGHT: 202 LBS | DIASTOLIC BLOOD PRESSURE: 93 MMHG | TEMPERATURE: 98.3 F | BODY MASS INDEX: 35.79 KG/M2 | OXYGEN SATURATION: 100 % | HEART RATE: 79 BPM

## 2021-06-18 PROCEDURE — 99072 ADDL SUPL MATRL&STAF TM PHE: CPT

## 2021-06-18 PROCEDURE — 99214 OFFICE O/P EST MOD 30 MIN: CPT

## 2021-06-18 NOTE — HISTORY OF PRESENT ILLNESS
[de-identified] : Ms Jonas is a 45 year old woman who was diagnosed with a pT1cN1 stage 2a , ER + MD+ HER 2 nicole positive breast cancer in October 2017, after self palpating a lump in the left. She had breast imaging at [facility] in [place]. Left breast biopsy was done at [facility] in [facility].\par \par She had neoadjuvant chemotherapy with Dr Pacheco, 11/2017 [facility] had pCR  followed by a year completion of HP.  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 On b12 shots .  Bilateral mastectomies with reconstruction with Askikari at [facility] which revealed no evidence of residual disease. She underwent post-operative radiation therapy with Dr Purdy at [facility].  She underwent menopause during chemotherapy, and then menses resumed 2019.  She then started ovarian suppression with Zoladex in March of 2019. She  then began exemestane in 2019.  \par Hx of chest pain - [facility] \par \par Began Nerlynx in January of 2020 .  + diarrhea, intermittent toe infections.  She had several breaks in Nerlyx treatments because of infection and respiratory illness.  Tested negative for COVID. Total of 13 weeks of breaks from Nerlynx.  She believes due to complete late April 2021. currently on 4 pills a day \par \par There have been several post operative infections and complication requiring multiple reconstructive surgeries.  Chronic lymphedema of both arms, also has had several episodes of bilateral chest wall cellullitis.\par hx of uti on spressive nitrofuriton.  hx of high bp \par \par She is here to transfer care. \par \par Sees rheumatology. a lot of joint pains. \par \par PETCT negative for any mets or lesions jan 2021 \par \par saw Dr. Combs- did R axilla US due to palpable lymph node ( was not sure if this was r/t J&J shot) [de-identified] : \par 2 weeks ago stopped nerlynx \par more hot flashes this month \par in general feeling better off nerlynx \par still having a lot joint pains \par on zoladex \par gained a little weight \par still having a lot of pain in ankles (bone spurs and arthritis) \par headache is better

## 2021-06-18 NOTE — PHYSICAL EXAM
[Restricted in physically strenuous activity but ambulatory and able to carry out work of a light or sedentary nature] : Status 1- Restricted in physically strenuous activity but ambulatory and able to carry out work of a light or sedentary nature, e.g., light house work, office work [Normal] : affect appropriate [de-identified] : nodule no longer in left chest wall   [de-identified] : irregular papular 0.2cm lesion noted to inner cantus of R eye lid. no drainage or erythema. no point tenderness to left big toe

## 2021-06-18 NOTE — ASSESSMENT
[FreeTextEntry1] :  THis is a 44 y/o woman with a hx of her positive er positive a pT1cN1 stage 2a  of left breast s/p neoadjuvant [facility] followed by bilateral mastecomty with node dissection , negative for residual disease , pCR. \par S/p 12 additional doses of Herceptin and perjeta. THen  radiation. Then started on nerlynx in jan 2020 . \par SHe also started on zoladex and exemestane in 2019. \par \par 1) breast cancer \par doing well overalll , DUYEN \par PET/CT negative for any evidence of metastases, jan 2021 \par Markers negative we will continue to monitor every 3 months\par proceed with zoladex , discussed possible ovary removal in future \par chest wall nodule resolved \par s/p follow up dr gracia , may 2021 \par completed nerlynx ( 1 year ) \par And continues on exemestane continues to have a lot of joint pains.  We discussed today switching to a different aromatase inhibitor.  We will try letrozole.  Discussed the side effects including but not limited to joint pains hot flashes bone thinning, weight gain, hair loss etc.\par \par 2) back pain \par PET/CT negative for metastases discussed with patient at length\par stable , no increase \par repeat markers \par Continue exemestane, patient declines switch at this point until she is stable after completing the Nerlynx\par \par 3) lymphedema \par cont PT \par \par 4) chest pain \par echo ok \par PET/CT negative for any evidence of lung or chest wall or bony disease discussed with patient\par s/p extensive cardiac evaluation\par \par 5) diarrhea \par was due to nerlynx now improved \par i still think she should do a colonoscopy given family hx \par \par 6) obesity \par advise weight loss and exercise \par \par 7) chronic uti \par check ua and uc again today today\par needs to see gyn \par pt should stop nitrofuotoin now that done with nerlynx \par \par 8) osteopenia \par Continue vitamin D and exercise\par dexa normal feb 2020  \par check every 2 year \par 9) vit d def \par cont vit d \par \par 10) cellultitis right toe \par now resolved \par \par 11) joint pains \par could be due to ai or zoladex or other etiology ( autoimmune) \par change from exemestane to femara , reviewed risks and benefits at length as above\par \par 12) headache \par check esr \par improved \par \par again advised gyn and colonosocpy \par \par Repeat labs today\par Follow-up in 1 month\par

## 2021-06-18 NOTE — REVIEW OF SYSTEMS
[Shortness Of Breath] : shortness of breath [SOB on Exertion] : shortness of breath during exertion [Diarrhea] : diarrhea [Hot Flashes] : hot flashes [Easy Bleeding] : a tendency for easy bleeding [Negative] : Psychiatric [Fever] : no fever [Chills] : no chills [Night Sweats] : no night sweats [Fatigue] : fatigue [Recent Change In Weight] : ~T no recent weight change [Wheezing] : no wheezing [Cough] : no cough [Joint Pain] : joint pain [Anxiety] : no anxiety [Easy Bruising] : no tendency for easy bruising [Swollen Glands] : no swollen glands [FreeTextEntry4] : see interval hx [FreeTextEntry7] : see interval hx [FreeTextEntry8] : frequent UTIs on Nerlynx, on prophylactic ABX [FreeTextEntry9] : toe is better  [de-identified] : numbness and tingling hands and feet - neuropathy, stable

## 2021-06-20 LAB
25(OH)D3 SERPL-MCNC: 25.7 NG/ML
ALBUMIN SERPL ELPH-MCNC: 4.5 G/DL
ALP BLD-CCNC: 105 U/L
ALT SERPL-CCNC: 18 U/L
ANION GAP SERPL CALC-SCNC: 16 MMOL/L
APPEARANCE: CLEAR
AST SERPL-CCNC: 20 U/L
BILIRUB SERPL-MCNC: 0.6 MG/DL
BILIRUBIN URINE: NEGATIVE
BLOOD URINE: NEGATIVE
BUN SERPL-MCNC: 22 MG/DL
CALCIUM SERPL-MCNC: 9.7 MG/DL
CANCER AG15-3 SERPL-ACNC: 10.2 U/ML
CEA SERPL-MCNC: 1.6 NG/ML
CHLORIDE SERPL-SCNC: 102 MMOL/L
CO2 SERPL-SCNC: 26 MMOL/L
COLOR: COLORLESS
CREAT SERPL-MCNC: 0.83 MG/DL
ESTRADIOL SERPL-MCNC: 16 PG/ML
FERRITIN SERPL-MCNC: 55 NG/ML
FOLATE SERPL-MCNC: >20 NG/ML
GLUCOSE QUALITATIVE U: NEGATIVE
GLUCOSE SERPL-MCNC: 48 MG/DL
IRON SATN MFR SERPL: 41 %
IRON SERPL-MCNC: 116 UG/DL
KETONES URINE: NEGATIVE
LEUKOCYTE ESTERASE URINE: NEGATIVE
NITRITE URINE: NEGATIVE
PH URINE: 6
POTASSIUM SERPL-SCNC: 4.6 MMOL/L
PROT SERPL-MCNC: 7.2 G/DL
PROTEIN URINE: NEGATIVE
SODIUM SERPL-SCNC: 145 MMOL/L
SPECIFIC GRAVITY URINE: 1.01
TIBC SERPL-MCNC: 286 UG/DL
TSH SERPL-ACNC: 1.45 UIU/ML
UIBC SERPL-MCNC: 170 UG/DL
UROBILINOGEN URINE: NORMAL
VIT B12 SERPL-MCNC: 744 PG/ML

## 2021-06-21 ENCOUNTER — NON-APPOINTMENT (OUTPATIENT)
Age: 46
End: 2021-06-21

## 2021-06-24 LAB — BACTERIA UR CULT: NORMAL

## 2021-07-15 ENCOUNTER — APPOINTMENT (OUTPATIENT)
Dept: HEMATOLOGY ONCOLOGY | Facility: CLINIC | Age: 46
End: 2021-07-15
Payer: COMMERCIAL

## 2021-07-15 ENCOUNTER — NON-APPOINTMENT (OUTPATIENT)
Age: 46
End: 2021-07-15

## 2021-07-15 VITALS
TEMPERATURE: 99.3 F | OXYGEN SATURATION: 98 % | HEART RATE: 75 BPM | BODY MASS INDEX: 36.5 KG/M2 | RESPIRATION RATE: 17 BRPM | DIASTOLIC BLOOD PRESSURE: 94 MMHG | WEIGHT: 206 LBS | HEIGHT: 63 IN | SYSTOLIC BLOOD PRESSURE: 138 MMHG

## 2021-07-15 DIAGNOSIS — Z00.00 ENCOUNTER FOR GENERAL ADULT MEDICAL EXAMINATION W/OUT ABNORMAL FINDINGS: ICD-10-CM

## 2021-07-15 PROCEDURE — 99072 ADDL SUPL MATRL&STAF TM PHE: CPT

## 2021-07-15 PROCEDURE — 99215 OFFICE O/P EST HI 40 MIN: CPT

## 2021-07-16 LAB
25(OH)D3 SERPL-MCNC: 36.2 NG/ML
APPEARANCE: CLEAR
BACTERIA: NEGATIVE
BILIRUBIN URINE: NEGATIVE
BLOOD URINE: NEGATIVE
CANCER AG15-3 SERPL-ACNC: 9.9 U/ML
CEA SERPL-MCNC: 1.6 NG/ML
COLOR: COLORLESS
FERRITIN SERPL-MCNC: 59 NG/ML
GLUCOSE QUALITATIVE U: NEGATIVE
HYALINE CASTS: 0 /LPF
IRON SATN MFR SERPL: 39 %
IRON SERPL-MCNC: 124 UG/DL
KETONES URINE: NEGATIVE
LEUKOCYTE ESTERASE URINE: ABNORMAL
MICROSCOPIC-UA: NORMAL
NITRITE URINE: NEGATIVE
PH URINE: 7.5
PROTEIN URINE: NEGATIVE
RED BLOOD CELLS URINE: 0 /HPF
SPECIFIC GRAVITY URINE: 1
SQUAMOUS EPITHELIAL CELLS: 2 /HPF
TIBC SERPL-MCNC: 319 UG/DL
UIBC SERPL-MCNC: 195 UG/DL
UROBILINOGEN URINE: NORMAL
WHITE BLOOD CELLS URINE: 1 /HPF

## 2021-07-18 NOTE — REVIEW OF SYSTEMS
[Fatigue] : fatigue [SOB on Exertion] : shortness of breath during exertion [Diarrhea] : diarrhea [Joint Pain] : joint pain [Joint Stiffness] : joint stiffness [Hot Flashes] : hot flashes [Easy Bleeding] : a tendency for easy bleeding [Negative] : Psychiatric [Fever] : no fever [Chills] : no chills [Night Sweats] : no night sweats [Recent Change In Weight] : ~T no recent weight change [Shortness Of Breath] : no shortness of breath [Wheezing] : no wheezing [Cough] : no cough [Dysuria] : no dysuria [Incontinence] : no incontinence [Vaginal Discharge] : no vaginal discharge [Dysmenorrhea/Abn Vaginal Bleeding] : no dysmenorrhea/abnormal vaginal bleeding [Anxiety] : no anxiety [Easy Bruising] : no tendency for easy bruising [Swollen Glands] : no swollen glands [FreeTextEntry2] : fatigue is worse.  [FreeTextEntry7] : see interval hx [FreeTextEntry9] : see interval hx  [de-identified] :  neuropathy, stable  [de-identified] : stable.

## 2021-07-18 NOTE — HISTORY OF PRESENT ILLNESS
[de-identified] : Ms Jonas is a 45 year old woman who was diagnosed with a pT1cN1 stage 2a , ER + TN+ HER 2 nicole positive breast cancer in October 2017, after self palpating a lump in the left. She had breast imaging at [facility] in [place]. Left breast biopsy was done at [facility] in [facility].\par \par She had neoadjuvant chemotherapy with Dr Pacheco, 11/2017 [facility] had pCR  followed by a year completion of HP.  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 On b12 shots .  Bilateral mastectomies with reconstruction with Askikari at [facility] which revealed no evidence of residual disease. She underwent post-operative radiation therapy with Dr Purdy at [facility].  She underwent menopause during chemotherapy, and then menses resumed 2019.  She then started ovarian suppression with Zoladex in March of 2019. She  then began exemestane in 2019.  \par Hx of chest pain - [facility] \par \par Began Nerlynx in January of 2020 .  + diarrhea, intermittent toe infections.  She had several breaks in Nerlyx treatments because of infection and respiratory illness.  Tested negative for COVID. Total of 13 weeks of breaks from Nerlynx.  She believes due to complete late April 2021. currently on 4 pills a day \par \par There have been several post operative infections and complication requiring multiple reconstructive surgeries.  Chronic lymphedema of both arms, also has had several episodes of bilateral chest wall cellullitis.\par hx of uti on spressive nitrofuriton.  hx of high bp \par \par She is here to transfer care. \par \par Sees rheumatology. a lot of joint pains. \par \par PETCT negative for any mets or lesions jan 2021 \par \par saw Dr. Combs- did R axilla US due to palpable lymph node ( was not sure if this was r/t J&J shot)\par \par bone density 02/2020- normal bone density \par 03/2021- CTA chest normal \par 05/07/2021- R Axilla ultrasound shows no adenopathy \par \par \par  [de-identified] : hair loss \par still having a lot joint pains \par due for zoladex today. \par HA resolved. \par thinks she broke her left pinky toe yesterday. has a lot of pain \par left breast discomfort x 1 week. soreness and sharp pain when she presses near it. \par

## 2021-07-18 NOTE — REASON FOR VISIT
[Follow-Up Visit] : a follow-up [FreeTextEntry2] : clinical T1cN1a Stage IIa left breast cancer completed nerlynx. now on zoladex and exemestane

## 2021-07-18 NOTE — PHYSICAL EXAM
[Restricted in physically strenuous activity but ambulatory and able to carry out work of a light or sedentary nature] : Status 1- Restricted in physically strenuous activity but ambulatory and able to carry out work of a light or sedentary nature, e.g., light house work, office work [Obese] : obese [Normal] : grossly intact [de-identified] : nodule no longer palpable in left chest wall . noticeable bilateral breast implants. some pain upon palpation noted to left lateral breast. no axillary lymphadenopathy.  [de-identified] : no point tenderness [de-identified] : point tenderness upon palpation noted to left pinky toe. has paper tape for splint to lateral left 5th metatarsal aspect.  [de-identified] : irregular papular 0.2cm lesion noted to inner cantus of R eye lid.  [de-identified] : anxious

## 2021-07-18 NOTE — ASSESSMENT
[FreeTextEntry1] :  THis is a 46 y/o woman with a hx of her positive er positive a pT1cN1 stage 2a  of left breast s/p neoadjuvant TCHP followed by bilateral mastecomty with node dissection , negative for residual disease , pCR. \par S/p 12 additional doses of Herceptin and perjeta. THen  radiation. Then started on nerlynx in jan 2020 . \par SHe also started on zoladex and exemestane in 2019. interolant of exemestane, but does not want to switch yet to letrazole \par \par 1) breast cancer \par doing well overalll , DUYEN \par PET/CT negative for any evidence of metastases, jan 2021 \par Markers negative we will continue to monitor every 3 months\par proceed with zoladex , discussed possible ovary removal in future \par chest wall nodule resolved \par s/p follow up dr gracia , may 2021 \par completed nerlynx ( 1 year ) \par Currently continues on exemestane even though she has joint pain, but is stable.  Re-visited again switching to a different aromatase inhibitor, such as letrazole. pt is reluctant to switch at this time. \par has appt with Dr. Gracia 10/2021 \par \par 2) back pain \par PET/CT negative for metastases discussed with patient at length\par stable , no increase \par Continue exemestane, patient declines switch at this point until she is stable after completing the Nerlynx\par \par 3) lymphedema \par cont PT \par \par 4) chest pain \par echo ok \par PET/CT negative for any evidence of lung or chest wall or bony disease discussed with patient\par s/p extensive cardiac evaluation\par \par 5) diarrhea \par was due to nerlynx now improving \par patient re-advised to do colonoscopy given family hx \par \par 6) obesity \par advise weight loss and exercise \par \par 7) chronic uti \par check ua and uc again today today\par patient will see gyn\par pt should stop nitrofuotoin now that done with nerlynx \par \par 8) osteopenia \par Continue vitamin D and exercise\par dexa normal feb 2020  \par check every 2 year \par \par 9) vit d def \par cont vit d \par \par 10) toe pain\par previous Right toe cellulitis resolved\par X ray of left pinky toe r/o fracture. Recommended her to see podiatry \par \par 11) joint pains \par could be due to ai or zoladex or other etiology ( autoimmune) \par check lyme\par \par 12) headache \par improved \par \par 13) hair loss\par Likely r/t other etiology (autoimmune)\par check iron studies \par \par 14) left breast pain \par MRI of breast r/o implant rupture\par \par Follow up in 3 months with MD; \par 1 month for next zoladex.\par

## 2021-07-20 LAB — BACTERIA UR CULT: NORMAL

## 2021-07-22 ENCOUNTER — NON-APPOINTMENT (OUTPATIENT)
Age: 46
End: 2021-07-22

## 2021-07-22 LAB
B BURGDOR AB SER-IMP: NEGATIVE
B BURGDOR IGM PATRN SER IB-IMP: POSITIVE
B BURGDOR18KD IGG SER QL IB: NORMAL
B BURGDOR23KD IGG SER QL IB: NORMAL
B BURGDOR23KD IGM SER QL IB: PRESENT
B BURGDOR28KD IGG SER QL IB: NORMAL
B BURGDOR30KD IGG SER QL IB: NORMAL
B BURGDOR31KD IGG SER QL IB: NORMAL
B BURGDOR39KD IGG SER QL IB: NORMAL
B BURGDOR39KD IGM SER QL IB: NORMAL
B BURGDOR41KD IGG SER QL IB: PRESENT
B BURGDOR41KD IGM SER QL IB: PRESENT
B BURGDOR45KD IGG SER QL IB: NORMAL
B BURGDOR58KD IGG SER QL IB: NORMAL
B BURGDOR66KD IGG SER QL IB: NORMAL
B BURGDOR93KD IGG SER QL IB: NORMAL

## 2021-07-30 ENCOUNTER — NON-APPOINTMENT (OUTPATIENT)
Age: 46
End: 2021-07-30

## 2021-08-12 ENCOUNTER — APPOINTMENT (OUTPATIENT)
Dept: HEMATOLOGY ONCOLOGY | Facility: CLINIC | Age: 46
End: 2021-08-12
Payer: COMMERCIAL

## 2021-08-12 VITALS — DIASTOLIC BLOOD PRESSURE: 84 MMHG | SYSTOLIC BLOOD PRESSURE: 132 MMHG

## 2021-08-12 VITALS
HEART RATE: 88 BPM | RESPIRATION RATE: 18 BRPM | BODY MASS INDEX: 36.32 KG/M2 | SYSTOLIC BLOOD PRESSURE: 146 MMHG | WEIGHT: 205 LBS | OXYGEN SATURATION: 97 % | TEMPERATURE: 99.3 F | DIASTOLIC BLOOD PRESSURE: 105 MMHG | HEIGHT: 63 IN

## 2021-08-12 DIAGNOSIS — R68.89 OTHER GENERAL SYMPTOMS AND SIGNS: ICD-10-CM

## 2021-08-12 DIAGNOSIS — L08.9 LOCAL INFECTION OF THE SKIN AND SUBCUTANEOUS TISSUE, UNSPECIFIED: ICD-10-CM

## 2021-08-12 PROCEDURE — 99214 OFFICE O/P EST MOD 30 MIN: CPT

## 2021-08-12 NOTE — PHYSICAL EXAM
[Normal] : affect appropriate [de-identified] : bilateral mastectomy - left rubbery nodule at 12oclock, right breast negative for any masses  [de-identified] : irregular papular 0.2cm lesion noted to inner cantus of R eye lid. no drainage or erythema. no point tenderness to left big toe

## 2021-08-12 NOTE — HISTORY OF PRESENT ILLNESS
[de-identified] : Ms Jonas is a 45 year old woman who was diagnosed with a pT1cN1 stage 2a , ER + VT+ HER 2 nicole positive breast cancer in October 2017, after self palpating a lump in the left. She had breast imaging at [facility] in [place]. Left breast biopsy was done at [facility] in [facility].\par \par She had neoadjuvant chemotherapy with Dr Pacheco, 11/2017 [facility] had pCR  followed by a year completion of HP.  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 On b12 shots .  Bilateral mastectomies with reconstruction with Askikari at [facility] which revealed no evidence of residual disease. She underwent post-operative radiation therapy with Dr Purdy at [facility].  She underwent menopause during chemotherapy, and then menses resumed 2019.  She then started ovarian suppression with Zoladex in March of 2019. She  then began exemestane in 2019.  \par Hx of chest pain - [facility] \par \par Began Nerlynx in January of 2020 .  + diarrhea, intermittent toe infections.  She had several breaks in Nerlyx treatments because of infection and respiratory illness.  Tested negative for COVID. Total of 13 weeks of breaks from Nerlynx.  She believes due to complete late April 2021. currently on 4 pills a day \par \par There have been several post operative infections and complication requiring multiple reconstructive surgeries.  Chronic lymphedema of both arms, also has had several episodes of bilateral chest wall cellullitis.\par hx of uti on spressive nitrofuriton.  hx of high bp \par \par She is here to transfer care. \par \par Sees rheumatology. a lot of joint pains. \par \par PETCT negative for any mets or lesions jan 2021 \par \par saw Dr. Combs- did R axilla US due to palpable lymph node ( was not sure if this was r/t J&J shot) [de-identified] : switched to femara , tolreating well so far \par less gi symptoms now off nerlynx( may 2021)  , diarrhea still getting , still taking imodium \par due to go back \par brokedn toe \par lyme - s/p  doxycycline ( 2 weeks) \par had mri  7/21/21\par s/p mammo \par in 6 months having mammo and mri \par will see  samia oct 2021\par dr hernandez will see , aug 30 \par moving into new appartment

## 2021-08-12 NOTE — ASSESSMENT
[FreeTextEntry1] :  THis is a 44 y/o woman with a hx of her positive er positive a pT1cN1 stage 2a  of left breast s/p neoadjuvant TCHP followed by bilateral mastecomty with node dissection , negative for residual disease , pCR. \par S/p 12 additional doses of Herceptin and perjeta. THen  radiation. Then started on nerlynx in jan 2020 . \par SHe also started on zoladex and exemestane in 2019.  Now on femara .\par Completed nerlynx may 2021 \par \par 1) breast cancer \par doing well overalll , DUYEN \par PET/CT negative for any evidence of metastases, jan 2021\par proceed with zoladex , discussed gyn consult for oophorectomy , has appt to discuss \par s/p mri and mammogram \par will have 6 month follow up \par completed nerlynx ( 1 year ) ( may 2021) \par femara tolerating much better than exemestane \par cont femara \par \par 2) back pain \par PET/CT negative for metastases discussed with patient at length\par stable , no increase \par \par \par 3) lymphedema \par cont PT \par \par 4) chest pain \par echo ok \par PET/CT negative for any evidence of lung or chest wall or bony disease discussed with patient\par s/p extensive cardiac evaluation\par \par 5) diarrhea \par improved \par advise gi work up and follow up \par \par 6) obesity \par advise weight loss and exercise \par \par 7) chronic uti \par check ua and uc again today today\par needs to see gyn \par stop macrobid now and audra for infection \par \par 8) osteopenia \par Continue vitamin D and exercise\par dexa normal feb 2020  \par check every 2 year \par \par 9) vit d def \par cont vit d \par \par 10) cellultitis right toe \par now resolved \par s/p fracture and follow up with podiatry \par \par 11) joint pains \par could be due to ai or zoladex or other etiology ( autoimmune) \par slight better on femara \par cont femara \par \par 12) headache \par check esr \par improved \par \par again advised gyn and colonosocpy \par \par Repeat labs today\par Follow-up in 1 month\par

## 2021-08-13 LAB
APPEARANCE: ABNORMAL
BACTERIA: ABNORMAL
BILIRUBIN URINE: NEGATIVE
BLOOD URINE: NEGATIVE
COLOR: COLORLESS
GLUCOSE QUALITATIVE U: NEGATIVE
HYALINE CASTS: 0 /LPF
KETONES URINE: NEGATIVE
LEUKOCYTE ESTERASE URINE: NEGATIVE
MICROSCOPIC-UA: NORMAL
NITRITE URINE: NEGATIVE
PH URINE: 6
PROTEIN URINE: NEGATIVE
RED BLOOD CELLS URINE: 0 /HPF
SPECIFIC GRAVITY URINE: 1.01
SQUAMOUS EPITHELIAL CELLS: 1 /HPF
UROBILINOGEN URINE: NORMAL
WHITE BLOOD CELLS URINE: 0 /HPF

## 2021-08-15 LAB
CANCER AG15-3 SERPL-ACNC: 9.6 U/ML
CEA SERPL-MCNC: 1.7 NG/ML

## 2021-08-16 LAB — BACTERIA UR CULT: NORMAL

## 2021-08-24 ENCOUNTER — NON-APPOINTMENT (OUTPATIENT)
Age: 46
End: 2021-08-24

## 2021-08-24 LAB
B BURGDOR AB SER-IMP: NEGATIVE
B BURGDOR IGM PATRN SER IB-IMP: POSITIVE
B BURGDOR18KD IGG SER QL IB: NORMAL
B BURGDOR23KD IGG SER QL IB: NORMAL
B BURGDOR23KD IGM SER QL IB: PRESENT
B BURGDOR28KD IGG SER QL IB: NORMAL
B BURGDOR30KD IGG SER QL IB: NORMAL
B BURGDOR31KD IGG SER QL IB: NORMAL
B BURGDOR39KD IGG SER QL IB: NORMAL
B BURGDOR39KD IGM SER QL IB: NORMAL
B BURGDOR41KD IGG SER QL IB: NORMAL
B BURGDOR41KD IGM SER QL IB: PRESENT
B BURGDOR45KD IGG SER QL IB: NORMAL
B BURGDOR58KD IGG SER QL IB: NORMAL
B BURGDOR66KD IGG SER QL IB: NORMAL
B BURGDOR93KD IGG SER QL IB: NORMAL

## 2021-09-08 ENCOUNTER — APPOINTMENT (OUTPATIENT)
Dept: HEMATOLOGY ONCOLOGY | Facility: CLINIC | Age: 46
End: 2021-09-08
Payer: COMMERCIAL

## 2021-09-08 VITALS
BODY MASS INDEX: 36.32 KG/M2 | HEIGHT: 63 IN | RESPIRATION RATE: 16 BRPM | HEART RATE: 96 BPM | SYSTOLIC BLOOD PRESSURE: 143 MMHG | OXYGEN SATURATION: 99 % | DIASTOLIC BLOOD PRESSURE: 92 MMHG | WEIGHT: 205 LBS

## 2021-09-08 DIAGNOSIS — M79.675 PAIN IN LEFT TOE(S): ICD-10-CM

## 2021-09-08 PROCEDURE — 99213 OFFICE O/P EST LOW 20 MIN: CPT

## 2021-09-09 LAB
APPEARANCE: CLEAR
BACTERIA: ABNORMAL
BILIRUBIN URINE: NEGATIVE
BLOOD URINE: NEGATIVE
CANCER AG15-3 SERPL-ACNC: 9.7 U/ML
CEA SERPL-MCNC: 1.6 NG/ML
COLOR: NORMAL
COVID-19 NUCLEOCAPSID  GAM ANTIBODY INTERPRETATION: NEGATIVE
COVID-19 SPIKE DOMAIN ANTIBODY INTERPRETATION: POSITIVE
GLUCOSE QUALITATIVE U: NEGATIVE
HYALINE CASTS: 1 /LPF
KETONES URINE: NEGATIVE
LEUKOCYTE ESTERASE URINE: NEGATIVE
MICROSCOPIC-UA: NORMAL
NITRITE URINE: NEGATIVE
PH URINE: 6
PROTEIN URINE: NEGATIVE
RED BLOOD CELLS URINE: 1 /HPF
SARS-COV-2 AB SERPL IA-ACNC: >250 U/ML
SARS-COV-2 AB SERPL QL IA: 0.07 INDEX
SPECIFIC GRAVITY URINE: 1.01
SQUAMOUS EPITHELIAL CELLS: 1 /HPF
UROBILINOGEN URINE: NORMAL
WHITE BLOOD CELLS URINE: 0 /HPF

## 2021-09-10 RX ORDER — DOXYCYCLINE HYCLATE 100 MG/1
100 TABLET ORAL
Qty: 28 | Refills: 0 | Status: DISCONTINUED | COMMUNITY
Start: 2021-07-21 | End: 2021-09-10

## 2021-09-10 RX ORDER — NERATINIB 40 MG/1
40 TABLET ORAL
Qty: 180 | Refills: 2 | Status: DISCONTINUED | COMMUNITY
End: 2021-09-10

## 2021-09-10 RX ORDER — EXEMESTANE 25 MG/1
25 TABLET, FILM COATED ORAL DAILY
Qty: 30 | Refills: 2 | Status: DISCONTINUED | COMMUNITY
End: 2021-09-10

## 2021-09-12 PROBLEM — M79.675 PAIN OF TOE OF LEFT FOOT: Status: ACTIVE | Noted: 2021-07-15

## 2021-09-12 NOTE — HISTORY OF PRESENT ILLNESS
[de-identified] : Toe is much better- now wearing regular shoes\par had appt with Dr. Ramirez- due to 2 positive IgM lyme - was told she was okay\par tolerating femara\par thinks she may have a UTI- off of macrobid- \par has appt with urogyn on 10/19/21\par will be making appt to see GI- diarrhea still there, but manageable. \par saw Regular Gyn ( Ce[facility]) \par kids going back to school- wants to check her antibodies for covid \par  [de-identified] : Ms Jonas is a 45 year old woman who was diagnosed with a pT1cN1 stage 2a , ER + CT+ HER 2 nicole positive breast cancer in October 2017, after self palpating a lump in the left. She had breast imaging at [facility] in [place]. Left breast biopsy was done at [facility] in [facility].\par \par She had neoadjuvant chemotherapy with Dr Pacheco, 11/2017 TCHP had pCR  followed by a year completion of HP.  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 On b12 shots .  Bilateral mastectomies with reconstruction with Askikari at [facility] which revealed no evidence of residual disease. She underwent post-operative radiation therapy with Dr Purdy at [facility].  She underwent menopause during chemotherapy, and then menses resumed 2019.  She then started ovarian suppression with Zoladex in March of 2019. She  then began exemestane in 2019.  \par Hx of chest pain - [facility] \par \par Began Nerlynx in January of 2020 .  + diarrhea, intermittent toe infections.  She had several breaks in Nerlyx treatments because of infection and respiratory illness.  Tested negative for COVID. Total of 13 weeks of breaks from Nerlynx.  She believes due to complete late April 2021. currently on 4 pills a day \par \par There have been several post operative infections and complication requiring multiple reconstructive surgeries.  Chronic lymphedema of both arms, also has had several episodes of bilateral chest wall cellullitis.\par hx of uti on spressive nitrofuriton.  hx of high bp \par \par She is here to transfer care. \par \par Sees rheumatology. a lot of joint pains. \par \par PETCT negative for any mets or lesions jan 2021 \par \par saw Dr. Combs- did R axilla US due to palpable lymph node ( was not sure if this was r/t J&J shot)\par \par MRI breast 07/21/2021- s/p bilateral mastectomy with implant reconstruction with radial folds and no MRI evidence or intra or extracapsular implant rupture. At the inner upper aspect of the right reconstructed breast , there is a sub centimeter enhancing lesion with washout kinetics. No suspicious MRI abnormality in the left reconstructed breast. \par \par Mammogram 07/28/21- At inner upper aspect of right breast appx 1.8cm from the anterior skin surface, there is a 4 x 6 x 4 mm area of probable developing fat necrosis. At the inner aspect of the right breast, there is a 5mm calcifying oil cysts/fat necrosis. In the right axillary tail areas of calcifying fat necrosis are present. \par \par R breast ultrasound 07/28/21- s/p R mastectomy with silicone implant reconstruction. Findings consistent with calcified fat necrosis and probable developing fat necrosis at the inner upper asepct of right breast likely accounting for the MRI finding.

## 2021-09-12 NOTE — ASSESSMENT
[FreeTextEntry1] :  THis is a 44 y/o woman with a hx of her positive er positive a pT1cN1 stage 2a  of left breast s/p neoadjuvant TCHP followed by bilateral mastecomty with node dissection , negative for residual disease , pCR. \par S/p 12 additional doses of Herceptin and perjeta. THen  radiation. Then started on nerlynx in jan 2020 . \par SHe also started on zoladex and exemestane in 2019.  Now on femara .\par Completed nerlynx may 2021 \par \par 1) breast cancer \par doing well overalll , DUYEN \par PET/CT negative for any evidence of metastases, jan 2021\par proceed with zoladex , discussed gyn consult for oophorectomy , has appt to discuss \par s/p mri and mammogram \par will have 6 month follow up \par completed nerlynx ( 1 year ) ( may 2021) \par femara tolerating much better than exemestane \par cont femara \par - has follow up appt with Dr. Combs 10/11/21\par \par 2) back pain \par PET/CT negative for metastases discussed with patient at length\par stable , no increase \par \par 3) lymphedema \par cont PT \par \par 4) chest pain \par echo ok \par PET/CT negative for any evidence of lung or chest wall or bony disease discussed with patient\par s/p extensive cardiac evaluation\par \par 5) diarrhea \par improved \par Re-advised gi work up and follow up \par \par 6) obesity \par advise weight loss and exercise \par \par 7) chronic uti \par check ua and uc again today today\par s/p eval with Dr. Zhao\par - has appt with Urogyn on 10/19/2021\par currently off of antibiotics \par \par 8) osteopenia \par Continue vitamin D and exercise\par dexa normal feb 2020  \par check every 2 year \par \par 9) vit d def \par cont vit d \par \par 10) cellulitis right toe \par now resolved \par s/p fracture and follow up with podiatry \par \par 11) joint pains \par stable\par could be due to ai or zoladex or other etiology ( autoimmune) \par slight better on femara \par cont femara \par \par 12) headache \par improved \par \par discussed with pt at length. \par Follow-up in 1 month\par

## 2021-09-12 NOTE — REVIEW OF SYSTEMS
[Fatigue] : fatigue [SOB on Exertion] : shortness of breath during exertion [Diarrhea] : diarrhea [Joint Pain] : joint pain [Joint Stiffness] : joint stiffness [Negative] : Allergic/Immunologic [Fever] : no fever [Night Sweats] : no night sweats [Recent Change In Weight] : ~T no recent weight change [Dysuria] : no dysuria [Abdominal Pain] : no abdominal pain [Incontinence] : no incontinence [Skin Rash] : no skin rash [Skin Wound] : no skin wound [FreeTextEntry8] : may have another UTI

## 2021-09-14 ENCOUNTER — NON-APPOINTMENT (OUTPATIENT)
Age: 46
End: 2021-09-14

## 2021-09-14 LAB — BACTERIA UR CULT: ABNORMAL

## 2021-09-21 ENCOUNTER — NON-APPOINTMENT (OUTPATIENT)
Age: 46
End: 2021-09-21

## 2021-10-07 ENCOUNTER — APPOINTMENT (OUTPATIENT)
Dept: HEMATOLOGY ONCOLOGY | Facility: CLINIC | Age: 46
End: 2021-10-07
Payer: COMMERCIAL

## 2021-10-07 DIAGNOSIS — N39.0 URINARY TRACT INFECTION, SITE NOT SPECIFIED: ICD-10-CM

## 2021-10-07 PROCEDURE — 99214 OFFICE O/P EST MOD 30 MIN: CPT

## 2021-10-07 NOTE — PHYSICAL EXAM
[Normal] : affect appropriate [de-identified] : irregular papular 0.2cm lesion noted to inner cantus of R eye lid. no drainage or erythema. no point tenderness to left big toe

## 2021-10-07 NOTE — REVIEW OF SYSTEMS
[Fever] : no fever [Chills] : no chills [Fatigue] : fatigue [Recent Change In Weight] : ~T recent weight change [Diarrhea] : diarrhea [Joint Pain] : joint pain [Negative] : Allergic/Immunologic [de-identified] : hair loss

## 2021-10-07 NOTE — HISTORY OF PRESENT ILLNESS
[de-identified] : Ms Jonas is a 45 year old woman who was diagnosed with a pT1cN1 stage 2a , ER + DC+ HER 2 nicole positive breast cancer in October 2017, after self palpating a lump in the left. She had breast imaging at [facility] in [place]. Left breast biopsy was done at [facility] in [facility].\par \par She had neoadjuvant chemotherapy with Dr Pacheco, 11/2017 TCHP had pCR  followed by a year completion of HP.  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 On b12 shots .  Bilateral mastectomies with reconstruction with Askikari at [facility] which revealed no evidence of residual disease. She underwent post-operative radiation therapy with Dr Purdy at [facility].  She underwent menopause during chemotherapy, and then menses resumed 2019.  She then started ovarian suppression with Zoladex in March of 2019. She  then began exemestane in 2019.  \par Hx of chest pain - [facility] \par \par Began Nerlynx in January of 2020 .  + diarrhea, intermittent toe infections.  She had several breaks in Nerlyx treatments because of infection and respiratory illness.  Tested negative for COVID. Total of 13 weeks of breaks from Nerlynx.  She believes due to complete late April 2021. currently on 4 pills a day \par \par There have been several post operative infections and complication requiring multiple reconstructive surgeries.  Chronic lymphedema of both arms, also has had several episodes of bilateral chest wall cellullitis.\par hx of uti on spressive nitrofuriton.  hx of high bp \par \par She is here to transfer care. \par \par Sees rheumatology. a lot of joint pains. \par \par PETCT negative for any mets or lesions jan 2021 \par \par saw Dr. Combs- did R axilla US due to palpable lymph node ( was not sure if this was r/t J&J shot)\par \par less gi symptoms now off nerlynx( may 2021)   [de-identified] : \par had uti still on on nitrofuotoin , now on low dose prophylaxis \par in 6 months having mammo and mri \par has appt with uro gyn dr lynn ( saw regular gyn ) \par hair is still falling out \par will be seeing dr corey , doesn’t want to do cholesterol medicine , was told cholesterol is to high , wants to change cardiologist \par on letrozole ( july ) \par complted doxycycline ( saw ID) \par has appt for dr gracia on oct 11, 2021 \par has diarrhea , has to follow up with gi

## 2021-10-07 NOTE — ASSESSMENT
[FreeTextEntry1] :  THis is a 44 y/o woman with a hx of her positive er positive a pT1cN1 stage 2a  of left breast s/p neoadjuvant TCHP followed by bilateral mastecomty with node dissection , negative for residual disease , pCR. \par S/p 12 additional doses of Herceptin and perjeta. THen  radiation. Then started on nerlynx in jan 2020 . \par SHe also started on zoladex and exemestane in 2019.  Now on femara .\par Completed nerlynx may 2021 \par \par 1) breast cancer \par doing well overalll , DUYEN \par PET/CT negative for any evidence of metastases, jan 2021\par tolerating  zoladex , discussed oophorectomy with her gynecologist however there is no immediate plans as her gynecologist does not favor\par s/p mri and mammogram \par follow-up with Dr. Aleyda Anaya next week\par completed nerlynx ( 1 year ) ( may 2021) \par femara tolerating much better than exemestane \par Unclear cause of hair loss we will try to check additional work-up however if the hair loss continues may need to change AI again\par cont femara \par Proceed with Zoladex for ovarian suppression in a high risk ER positive breast cancer\par \par 2) back pain \par PET/CT negative for metastases discussed with patient at length\par stable , no increase \par \par \par 3) lymphedema \par cont PT \par \par 4) chest pain \par echo ok \par PET/CT negative for any evidence of lung or chest wall or bony disease discussed with patient\par s/p extensive cardiac evaluation\par \par 5) diarrhea \par improved \par advise gi follow up as still having diarrhea \par \par 6) obesity \par advise weight loss and exercise \par \par 7) chronic uti \par follow up uro gyn \par on suppressive antibitoics \par \par 8) osteopenia \par Continue vitamin D and exercise\par dexa normal feb 2020  \par check every 2 year \par \par 9) vit d def \par cont vit d \par \par 10) cellultitis right toe \par improved \par s/p fracture ,  follow up with podiatry \par \par 11) joint pains \par could be due to ai or zoladex or other etiology ( autoimmune) \par  better on femara \par cont femara \par has seen rheumatology \par \par 12) headache \par improved \par \par again advised   colonosocpy \par \par Repeat labs today\par Follow-up in 1 month\par

## 2021-10-07 NOTE — REASON FOR VISIT
[Follow-Up Visit] : a follow-up [FreeTextEntry2] : Presents for follow-up of breast cancer no skilled PT needs/home home/no skilled PT needs/Pt is at baseline functional status.

## 2021-10-08 LAB
APPEARANCE: CLEAR
BACTERIA: NEGATIVE
BILIRUBIN URINE: NEGATIVE
BLOOD URINE: NEGATIVE
COLOR: COLORLESS
GLUCOSE QUALITATIVE U: NEGATIVE
HYALINE CASTS: 0 /LPF
KETONES URINE: NEGATIVE
LEUKOCYTE ESTERASE URINE: NEGATIVE
MICROSCOPIC-UA: NORMAL
NITRITE URINE: NEGATIVE
PH URINE: 6
PROTEIN URINE: NEGATIVE
RED BLOOD CELLS URINE: 0 /HPF
SPECIFIC GRAVITY URINE: 1
SQUAMOUS EPITHELIAL CELLS: 1 /HPF
UROBILINOGEN URINE: NORMAL
WHITE BLOOD CELLS URINE: 0 /HPF

## 2021-10-08 NOTE — PAST MEDICAL HISTORY
[Menarche Age ____] : age at menarche was [unfilled] [Menopause Age____] : age at menopause was [unfilled] [History of Hormone Replacement Treatment] : has a history of hormone replacement treatment [Total Preg ___] : G[unfilled] [Live Births ___] : P[unfilled]  [Abortions ___] : Abortions:[unfilled] [Age At Live Birth ___] : Age at live birth: [unfilled]

## 2021-10-10 LAB
25(OH)D3 SERPL-MCNC: 33 NG/ML
CANCER AG15-3 SERPL-ACNC: 10 U/ML
CEA SERPL-MCNC: 1.5 NG/ML

## 2021-10-11 ENCOUNTER — APPOINTMENT (OUTPATIENT)
Dept: BREAST CENTER | Facility: CLINIC | Age: 46
End: 2021-10-11
Payer: COMMERCIAL

## 2021-10-11 VITALS
HEIGHT: 63 IN | DIASTOLIC BLOOD PRESSURE: 90 MMHG | OXYGEN SATURATION: 97 % | BODY MASS INDEX: 36.32 KG/M2 | HEART RATE: 78 BPM | SYSTOLIC BLOOD PRESSURE: 140 MMHG | WEIGHT: 205 LBS

## 2021-10-11 DIAGNOSIS — R92.8 OTHER ABNORMAL AND INCONCLUSIVE FINDINGS ON DIAGNOSTIC IMAGING OF BREAST: ICD-10-CM

## 2021-10-11 LAB — BACTERIA UR CULT: NORMAL

## 2021-10-11 PROCEDURE — 99213 OFFICE O/P EST LOW 20 MIN: CPT

## 2021-10-11 NOTE — REASON FOR VISIT
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  The cancer was ER positive, MA negative, and HER-2 3+ and she underwent neoadjuvant TCHP chemotherapy through Dr. Pacheco.  She had bilateral total mastectomies through a Wise reduction pattern on April 10, 2018 and had bilateral expander reconstructions with AlloDerm.  Her nodes were all negative after neoadjuvant chemotherapy.  She finished Herceptin and Perjeta and had postmastectomy radiation and did develop some bouts of cellulitis and bilateral arm lymphedema.  She had her expanders exchanged for implants in 2019 and is currently on Zoladex, Aromasin, and is on neratinib and comes in for an interval follow-up with a questionable palpable right axillary lymph node felt by her medical oncologist.

## 2021-10-11 NOTE — REVIEW OF SYSTEMS
[Feeling Poorly] : feeling poorly [Feeling Tired] : feeling tired [Recent Weight Gain (___ Lbs)] : recent [unfilled] ~Ulb weight gain [Nasal Discharge] : nasal discharge [Sore Throat] : sore throat [Hoarseness] : hoarseness [Chest Pain] : chest pain [Shortness Of Breath] : shortness of breath [Diarrhea] : diarrhea [As Noted in HPI] : as noted in HPI [Joint Swelling] : joint swelling [Joint Stiffness] : joint stiffness [Limb Pain] : limb pain [Breast Pain] : breast pain [Breast Lump] : breast lump [Hot Flashes] : hot flashes [Negative] : Heme/Lymph [FreeTextEntry8] : burning urination

## 2021-10-11 NOTE — ASSESSMENT
[FreeTextEntry1] : The patient is a 45-year-old  still premenopausal white female of Albanian, English, swished, Belgian, Darlin, Upper sorbian, and  descent.  She underwent menarche at age 12 and had her first child at age 32.  She has a strong family history of breast and ovarian cancer with her maternal great aunt who had ovarian cancer who passed away.  She has a maternal second cousin who had breast cancer in her late 40s.  Her paternal grandfather passed away from colorectal cancer at age 65.  Her maternal grandfather had colon cancer around age 75.  She has 4 maternal great uncles who had esophageal and throat cancer and a paternal great aunt who had uterine/cervical cancer.  A paternal great uncle had lung cancer.  The patient underwent bilateral augmentation implants in  requiring multiple replacements for ruptures last exchanged in .  She noticed a density towards the lateral aspect of the left breast and mammography showed suspicious left retroareolar calcifications.  Ultrasound showed multiple suspicious areas in the left breast 2:00, 3:00, as well as a suspicious left axillary lymph node.  Ultrasound-guided core biopsies on 2017 showed the left retroareolar density to be an intermediate grade invasive duct cancer with surrounding DCIS which was ER positive at 80%, OK negative, and HER-2 3+ by IHC.  A separate left breast 2:00 density 5 cm from the nipple turned out to be metastatic cancer and an intramammary lymph node which was ER/OK positive and HER-2 positive.  The left axillary suspicious lymph node was also positive.  She had clips placed on all the biopsied lesions as well as in a separate left breast 2:00 and 9:00 density which turned out to be a fibroadenoma and fibrocystic change.  An MRI performed on 2017 showed the right breast to be negative but the left breast showed extensive enhancement and PET scan showed no distant disease.  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  She underwent neoadjuvant TCHP chemotherapy through Dr. Pacheco.  MRI after neoadjuvant chemotherapy on 2018 showed no further enhancement.  I then performed bilateral total mastectomies to a Wise reduction pattern incision on April 10, 2018 and she had bilateral expander reconstructions with AlloDerm.  She had a sentinel lymph node biopsy with localization of the nodes preoperatively and both the intramammary as well as axillary lymph nodes were all negative.  She ended up with another 12 nodes removed which were negative since the localization was not perfectly placed.  The left mastectomy showed no residual cancer in the right mastectomy was negative.  She did undergo a small wound revision of the left breast by Dr. Nava on May 23, 2018.  She is following up with Dr. Pacheco and finished Herceptin and Perjeta and underwent postmastectomy radiation therapy at [facility] which finished on 2018.  She developed some redness in the left breast and was admitted to [place] and placed on IV vancomycin for 3 days and the redness resolved.  She developed bilateral upper extremity lymphedema for which she wears compression sleeves.  She developed redness over the left implant and was placed on Levaquin in 2018 and the redness improved.  She again was placed on Cipro in 2019 for some cellulitis over the right implant.  She underwent fat grafting and removal of her port in 2019 and then had her implants removed and exchanged by Dr. Ye in 2019 for cosmesis.  She now follows up with Dr. Holyl Walker and is continuing on Zoladex as well as Aromasin and is finishing out neratinib.  She did have some neuropathy and follows up with Dr. Stephen.  On exam, I cannot feel any suspicious densities over either implant.  She has stable bilateral arm lymphedema.  There was a small density felt over the upper inner aspect of the left implant and I sent her for directed left breast ultrasound which was performed on 3/22/2021 showing no suspicious findings in that area.  Her redness has since improved and the density is no longer palpable.  She did have this soft palpable lymph node in the upper right axillary region but directed axillary ultrasound on May 7, 2021 only showed normal axillary lymph nodes.  She did have her 2nd COVID vaccination in 2021.  The patient then underwent a bilateral breast MRI ordered by medical oncology on 2021 which showed some enhancement in the upper inner aspect of the right breast and she was sent for targeted mammography and ultrasound of the right breast on 2021 just showing some developing fat necrosis likely from her fat injections in the past.  A follow-up diagnostic MRI and right breast mammography is recommended again in 2022.  She was reassured and can just follow-up again in 6 months.  She can give me a call back if she notices any worsening redness or return of any mass.  She finished her neratinib in May 2021.  Otherwise, she should remain on Zoladex and Aromasin, through Dr. Walker.

## 2021-10-11 NOTE — HISTORY OF PRESENT ILLNESS
[FreeTextEntry1] : The patient is a 45-year-old  still premenopausal white female of Lao, English, swished, Greenlandic, Darlin, Yi, and  descent.  She underwent menarche at age 12 and had her first child at age 32.  She has a strong family history of breast and ovarian cancer with her maternal great aunt who had ovarian cancer who passed away.  She has a maternal second cousin who had breast cancer in her late 40s.  Her paternal grandfather passed away from colorectal cancer at age 65.  Her maternal grandfather had colon cancer around age 75.  She has 4 maternal great uncles who had esophageal and throat cancer and a paternal great aunt who had uterine/cervical cancer.  A paternal great uncle had lung cancer.  The patient underwent bilateral augmentation implants in  requiring multiple replacements for ruptures last exchanged in .  She noticed a density towards the lateral aspect of the left breast and mammography showed suspicious left retroareolar calcifications.  Ultrasound showed multiple suspicious areas in the left breast 2:00, 3:00, as well as a suspicious left axillary lymph node.  Ultrasound-guided core biopsies on 2017 showed the left retroareolar density to be an intermediate grade invasive duct cancer with surrounding DCIS which was ER positive at 80%, NH negative, and HER-2 3+ by IHC.  A separate left breast 2:00 density 5 cm from the nipple turned out to be metastatic cancer and an intramammary lymph node which was ER/NH positive and HER-2 positive.  The left axillary suspicious lymph node was also positive.  She had clips placed on all the biopsied lesions as well as in a separate left breast 2:00 and 9:00 density which turned out to be a fibroadenoma and fibrocystic change.  An MRI performed on 2017 showed the right breast to be negative but the left breast showed extensive enhancement and PET scan showed no distant disease.  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  She underwent neoadjuvant TCHP chemotherapy through Dr. Pacheco.  MRI after neoadjuvant chemotherapy on 2018 showed no further enhancement.  I then performed bilateral total mastectomies to a Wise reduction pattern incision on April 10, 2018 and she had bilateral expander reconstructions with AlloDerm.  She had a sentinel lymph node biopsy with localization of the nodes preoperatively and both the intramammary as well as axillary lymph nodes were all negative.  She ended up with another 12 nodes removed which were negative since the localization was not perfectly placed.  The left mastectomy showed no residual cancer in the right mastectomy was negative.  She did undergo a small wound revision of the left breast by Dr. Nava on May 23, 2018.  She is following up with Dr. Pacheco and finished Herceptin and Perjeta and underwent postmastectomy radiation therapy at [facility] which finished on 2018.  She developed some redness in the left breast and was admitted to [place] and placed on IV vancomycin for 3 days and the redness resolved.  She developed bilateral upper extremity lymphedema for which she wears compression sleeves.  She developed redness over the left implant and was placed on Levaquin in 2018 and the redness improved.  She again was placed on Cipro in 2019 for some cellulitis over the right implant.  She underwent fat grafting and removal of her port in 2019 and then had her implants removed and exchanged by Dr. Ye in 2019 for cosmesis.  She now follows up with Dr. Holly Walker and is continuing on Zoladex as well as Aromasin and is finished neratinib in May 2021.  She did have some neuropathy and follows up with Dr. Stephen.  She had a questionable lymph node felt in her lower right axilla in May 2021 but directed ultrasound just showed normal lymph nodes.  MRI on 2021 showed some enhancement in the upper inner aspect of the right breast but mammography and ultrasound on 2021 just showed some areas of fat necrosis.  She comes in now for routine follow-up.

## 2021-10-11 NOTE — PHYSICAL EXAM
[Normocephalic] : normocephalic [Atraumatic] : atraumatic [EOMI] : extra ocular movement intact [Supple] : supple [No Supraclavicular Adenopathy] : no supraclavicular adenopathy [No Cervical Adenopathy] : no cervical adenopathy [Examined in the supine and seated position] : examined in the supine and seated position [No dominant masses] : no dominant masses in right breast  [No dominant masses] : no dominant masses left breast [Breast Mass Right Breast ___cm] : no masses [Breast Mass Left Breast ___cm] : no masses [___cm] : ~M [unfilled] ~Ucm upper inner quadrant mass [No Axillary Lymphadenopathy] : no left axillary lymphadenopathy [No Rashes] : no rashes [No Ulceration] : no ulceration [de-identified] : On exam, the patient has obvious bilateral mastectomies with implant reconstructions with a Nielsen pattern incision.  I cannot feel any suspicious densities over either implant.  She does have a slightly enlarged lymph node felt higher up in the right axilla but this is fairly soft and slightly tender.  She did get her COVID vaccination second dose in March 2021.  She has no supraclavicular, or cervical adenopathy.  Her left skin redness has improved and the previous medial density is no longer palpable. [de-identified] : Status post mastectomy and implant reconstruction with Wise pattern approach [de-identified] : Status post mastectomy and implant reconstruction with Wise pattern approach.  Improved redness over upper aspect of the left implant with resolution of density [de-identified] : Soft slightly enlarged right axillary lymph node [de-identified] : Stable upper extremity mild lymphedema

## 2021-11-09 ENCOUNTER — APPOINTMENT (OUTPATIENT)
Dept: HEMATOLOGY ONCOLOGY | Facility: CLINIC | Age: 46
End: 2021-11-09
Payer: COMMERCIAL

## 2021-11-09 VITALS
OXYGEN SATURATION: 97 % | BODY MASS INDEX: 35.79 KG/M2 | HEART RATE: 96 BPM | SYSTOLIC BLOOD PRESSURE: 140 MMHG | DIASTOLIC BLOOD PRESSURE: 130 MMHG | TEMPERATURE: 98.9 F | HEIGHT: 63 IN | RESPIRATION RATE: 18 BRPM | WEIGHT: 202 LBS

## 2021-11-09 PROCEDURE — 99214 OFFICE O/P EST MOD 30 MIN: CPT

## 2021-11-09 NOTE — ASSESSMENT
[FreeTextEntry1] :  THis is a 44 y/o woman with a hx of her positive er positive a pT1cN1 stage 2a  of left breast s/p neoadjuvant TCHP followed by bilateral mastecomty with node dissection , negative for residual disease , pCR. \par S/p 12 additional doses of Herceptin and perjeta. THen  radiation. Then started on nerlynx in jan 2020 . \par SHe also started on zoladex and exemestane in 2019.  Now on femara .\par Completed nerlynx may 2021 \par \par 1) breast cancer \par doing well overalll , DUYEN \par PET/CT negative for any evidence of metastases, jan 2021\par tolerating  zoladex \par s/p mri and mammogram \par follow-up with Dr. Aleyda Anaya oct 2021 \par completed nerlynx ( 1 year ) ( may 2021) \par femara tolerating well \par up todate Zoladex for ovarian suppression in a high risk ER positive breast cancer, cont montly \par recheck estradiol level \par rpeaet markers \par Check ultrasound of the breast now to evaluate the nodule palpable in the breast.\par \par \par 2) back pain \par PET/CT negative for metastases discussed with patient at length\par stable , no increase \par \par \par 3) lymphedema \par cont PT \par \par 4) chest pain \par echo ok \par PET/CT negative for any evidence of lung or chest wall or bony disease discussed with patient\par s/p extensive cardiac evaluation\par \par 5) diarrhea \par improved \par again advise gi follow up as still having diarrhea \par \par 6) obesity \par advise weight loss and exercise \par \par 7) chronic uti \par follow up uro gyn \par on suppressive antibitoics \par Repeat urinalysis and urine culture today\par \par 8) osteopenia \par Continue vitamin D and exercise\par dexa normal feb 2020  \par check every 2 year \par \par 9) vit d def \par cont vit d \par \par 10) cellultitis right toe \par improved \par s/p fracture ,  follow up with podiatry \par \par 11) joint pains \par could be due to ai or zoladex or other etiology ( autoimmune) \par  better on femara \par cont femara \par has seen rheumatology will be starting plaquenil \par \par 12) headache \par improved \par \par \par Repeat labs today\par Follow-up in 1 month\par

## 2021-11-09 NOTE — HISTORY OF PRESENT ILLNESS
[de-identified] : Ms Jonas is a 45 year old woman who was diagnosed with a pT1cN1 stage 2a , ER + IN+ HER 2 nicole positive breast cancer in October 2017, after self palpating a lump in the left. She had breast imaging at [facility] in [place]. Left breast biopsy was done at [facility] in [facility].\par \par She had neoadjuvant chemotherapy with Dr Pacheco, 11/2017 TCHP had pCR  followed by a year completion of HP.  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 On b12 shots .  Bilateral mastectomies with reconstruction with Askikari at [facility] which revealed no evidence of residual disease. She underwent post-operative radiation therapy with Dr Purdy at [facility].  She underwent menopause during chemotherapy, and then menses resumed 2019.  She then started ovarian suppression with Zoladex in March of 2019. She  then began exemestane in 2019.  \par Hx of chest pain - [facility] \par \par Began Nerlynx in January of 2020 .  + diarrhea, intermittent toe infections.  She had several breaks in Nerlyx treatments because of infection and respiratory illness.  Tested negative for COVID. Total of 13 weeks of breaks from Nerlynx.  She believes due to complete late April 2021. currently on 4 pills a day \par \par There have been several post operative infections and complication requiring multiple reconstructive surgeries.  Chronic lymphedema of both arms, also has had several episodes of bilateral chest wall cellullitis.\par hx of uti on spressive nitrofuriton.  hx of high bp \par \par She is here to transfer care. \par \par Sees rheumatology. a lot of joint pains. \par \par PETCT negative for any mets or lesions jan 2021 \par \par saw Dr. Combs- did R axilla US due to palpable lymph node ( was not sure if this was r/t J&J shot)\par \par less gi symptoms now off nerlynx( may 2021)   [de-identified] :  after visit here got reallly sick , severe fatigue , got 2 rounds of antibitoics, covid negative \par has  appt with urogyn  in jan2022 \par hasn’t had appt with gi \par very stressed out bc the shot \par nitrofuotoin -cont on \par found new nodule inner part of right breast \par letrozole \par got zoladex again on thurs zoladex \par saw rheumatology wants her to start plaquenil , hollie postivie \par still having a lot  joint pains

## 2021-11-09 NOTE — PHYSICAL EXAM
[Normal] : affect appropriate [de-identified] : Tiny 2 mm nodule in the mid inner quadrant right breast movable [de-identified] : irregular papular 0.2cm lesion noted to inner cantus of R eye lid. no drainage or erythema. no point tenderness to left big toe

## 2021-11-14 LAB
CANCER AG15-3 SERPL-ACNC: 10.4 U/ML
CEA SERPL-MCNC: 1.1 NG/ML
ESTRADIOL SERPL-MCNC: <5 PG/ML

## 2021-11-23 ENCOUNTER — NON-APPOINTMENT (OUTPATIENT)
Age: 46
End: 2021-11-23

## 2021-12-02 ENCOUNTER — LABORATORY RESULT (OUTPATIENT)
Age: 46
End: 2021-12-02

## 2021-12-02 ENCOUNTER — APPOINTMENT (OUTPATIENT)
Dept: HEMATOLOGY ONCOLOGY | Facility: CLINIC | Age: 46
End: 2021-12-02
Payer: COMMERCIAL

## 2021-12-02 VITALS
RESPIRATION RATE: 18 BRPM | TEMPERATURE: 98.4 F | HEART RATE: 90 BPM | DIASTOLIC BLOOD PRESSURE: 97 MMHG | SYSTOLIC BLOOD PRESSURE: 143 MMHG | OXYGEN SATURATION: 96 % | HEIGHT: 63 IN | WEIGHT: 204 LBS | BODY MASS INDEX: 36.14 KG/M2

## 2021-12-02 PROCEDURE — 99214 OFFICE O/P EST MOD 30 MIN: CPT

## 2022-01-04 ENCOUNTER — APPOINTMENT (OUTPATIENT)
Dept: HEMATOLOGY ONCOLOGY | Facility: CLINIC | Age: 47
End: 2022-01-04
Payer: COMMERCIAL

## 2022-01-04 VITALS
SYSTOLIC BLOOD PRESSURE: 167 MMHG | TEMPERATURE: 98.2 F | BODY MASS INDEX: 36.86 KG/M2 | DIASTOLIC BLOOD PRESSURE: 96 MMHG | WEIGHT: 208 LBS | OXYGEN SATURATION: 97 % | HEIGHT: 63 IN | RESPIRATION RATE: 18 BRPM | HEART RATE: 89 BPM

## 2022-01-04 PROCEDURE — 99214 OFFICE O/P EST MOD 30 MIN: CPT

## 2022-01-04 NOTE — ASSESSMENT
[FreeTextEntry1] :  THis is a 44 y/o woman with a hx of her positive er positive a pT1cN1 stage 2a  of left breast s/p neoadjuvant TCHP followed by bilateral mastecomty with node dissection , negative for residual disease , pCR. \par S/p 12 additional doses of Herceptin and perjeta. THen  radiation. Then started on nerlynx in jan 2020 . \par SHe also started on zoladex and exemestane in 2019.  Now on femara .\par Completed nerlynx may 2021 \par \par 1) breast cancer \par doing well overalll , DUYEN \par PET/CT negative for any evidence of metastases, jan 2021\par tolerating  zoladex well \par s/p follow-up with Dr. Aleyda Anaya oct 2021 , s/p bx in nov negative - fat necrosis \par has mammo and mri jan 2022 \par completed nerlynx ( 1 year ) ( may 2021) \par femara tolerating well \par Continue Femara\par repeat markers \par Discussed other options instead of continuing Zoladex which would include oophorectomy versus switching to tamoxifen and coming off the Zoladex for a while.  Patient is anxious about coming off the Zoladex so we will continue for now.\par Patient was premenopausal prior to receiving chemotherapy so ovarian suppression is indicated in high risk patients to suppress ovarian function and reduce estrogen levels as demonstrated in the soft and text trial.\par Proceed with Zoladex.\par \par \par \par \par 2) back pain \par PET/CT negative for metastases discussed with patient at length\par stable , no increase \par \par \par 3) lymphedema \par cont PT \par \par 4) chest pain \par echo ok \par PET/CT negative for any evidence of lung or chest wall or bony disease discussed with patient\par s/p extensive cardiac evaluation\par \par 5) diarrhea \par improved \par again advise gi follow up as still having diarrhea , needs to have colonoscopy \par \par 6) obesity \par advise weight loss and exercise \par \par 7) chronic uti \par seeing uro gyn this month \par on suppressive antibitoics \par \par \par 8) osteopenia \par Continue vitamin D and exercise\par dexa normal feb 2020  \par check every 2 year \par \par 9) vit d def \par cont vit d \par \par 10) cellultitis right toe \par improved \par s/p fracture ,  follow up with podiatry \par \par 11) joint pains \par could be due to ai or zoladex or other etiology ( autoimmune) \par  better on femara \par discussed switch to tamoxifen but patient declines \par cont femara \par has appt with rheumatology this month \par \par 12) headache \par improved \par \par \par dw patient at length \par Follow-up in 1 month \par

## 2022-01-04 NOTE — HISTORY OF PRESENT ILLNESS
[de-identified] : Ms Jonas is a 45 year old woman who was diagnosed with a pT1cN1 stage 2a , ER + [place]+ HER 2 nicole positive breast cancer in October 2017, after self palpating a lump in the left. She had breast imaging at [facility] in [place]. Left breast biopsy was done at [facility] in [facility].\par \par She had neoadjuvant chemotherapy with Dr Pacheco, 11/2017 TCHP had pCR  followed by a year completion of HP.  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 On b12 shots .  Bilateral mastectomies with reconstruction with Askikari at [facility] which revealed no evidence of residual disease. She underwent post-operative radiation therapy with Dr Purdy at [facility].  She underwent menopause during chemotherapy, and then menses resumed 2019.  She then started ovarian suppression with Zoladex in March of 2019. She  then began exemestane in 2019.  \par Hx of chest pain - [facility] \par \par Began Nerlynx in January of 2020 .  + diarrhea, intermittent toe infections.  She had several breaks in Nerlyx treatments because of infection and respiratory illness.  Tested negative for COVID. Total of 13 weeks of breaks from Nerlynx.  She believes due to complete late April 2021. currently on 4 pills a day \par \par There have been several post operative infections and complication requiring multiple reconstructive surgeries.  Chronic lymphedema of both arms, also has had several episodes of bilateral chest wall cellullitis.\par hx of uti on spressive nitrofuriton.  hx of high bp \par \par She is here to transfer care. \par \par Sees rheumatology. a lot of joint pains. \par \par PETCT negative for any mets or lesions jan 2021 \par \par saw Dr. Combs- did R axilla US due to palpable lymph node ( was not sure if this was r/t J&J shot)\par \par less gi symptoms now off nerlynx( may 2021)   [de-identified] : \par feeling about the same \par still on zoladex monthly \par letrozole \par still having joint pains , didn’t start plaquenil \par goes back tomorrow to see rheumatology \par needs gi appt to discuss colonoscopy \par uorogyn tomorrow \par stil on antibotiics for urine \par will start probiotics \par still having diarrhea \par had another breast ultrasound , mri of breast and mammo  1/22/22 , then will see dr gracia \par

## 2022-01-04 NOTE — REVIEW OF SYSTEMS
[Joint Pain] : joint pain [Negative] : Allergic/Immunologic [FreeTextEntry7] : diarrhea  [FreeTextEntry8] : uti's

## 2022-01-07 LAB
CANCER AG15-3 SERPL-ACNC: 10.9 U/ML
CEA SERPL-MCNC: 1 NG/ML

## 2022-02-08 ENCOUNTER — APPOINTMENT (OUTPATIENT)
Dept: HEMATOLOGY ONCOLOGY | Facility: CLINIC | Age: 47
End: 2022-02-08
Payer: COMMERCIAL

## 2022-02-08 VITALS
SYSTOLIC BLOOD PRESSURE: 143 MMHG | TEMPERATURE: 98.9 F | HEART RATE: 89 BPM | WEIGHT: 206.8 LBS | HEIGHT: 63 IN | OXYGEN SATURATION: 98 % | DIASTOLIC BLOOD PRESSURE: 95 MMHG | RESPIRATION RATE: 18 BRPM | BODY MASS INDEX: 36.64 KG/M2

## 2022-02-08 DIAGNOSIS — N64.1 FAT NECROSIS OF BREAST: ICD-10-CM

## 2022-02-08 PROCEDURE — 99213 OFFICE O/P EST LOW 20 MIN: CPT

## 2022-02-08 RX ORDER — CIPROFLOXACIN HYDROCHLORIDE 500 MG/1
500 TABLET, FILM COATED ORAL
Qty: 14 | Refills: 0 | Status: DISCONTINUED | COMMUNITY
Start: 2021-09-10 | End: 2022-02-08

## 2022-02-08 NOTE — REVIEW OF SYSTEMS
[Fatigue] : fatigue [Joint Pain] : joint pain [Joint Stiffness] : joint stiffness [Anxiety] : anxiety [Depression] : depression [Hot Flashes] : hot flashes [Negative] : Allergic/Immunologic [FreeTextEntry7] : diarrhea  [FreeTextEntry8] : uti's

## 2022-02-08 NOTE — PHYSICAL EXAM
[Normal] : affect appropriate [de-identified] : b/l breast slightly asymmetrical. s/p b/l mastectomies with implant reconstruction. no palpable masses . no axillary lymphadenopathy.  [de-identified] : scalp without discoloration nor rashes.

## 2022-02-08 NOTE — ASSESSMENT
[FreeTextEntry1] :  THis is a 47 y/o woman with a hx of her positive er positive a pT1cN1 stage 2a  of left breast s/p neoadjuvant TCHP followed by bilateral mastecomty with node dissection , negative for residual disease , pCR. \par S/p 12 additional doses of Herceptin and perjeta. THen  radiation. Then started on nerlynx in jan 2020 . \par SHe also started on zoladex and exemestane in 2019.  Now on femara .\par Completed nerlynx may 2021 \par \par 1) breast cancer \par doing well overalll , DUYEN \par PET/CT negative for any evidence of metastases, jan 2021\par tolerating  zoladex well \par s/p follow-up with Dr. Aleyda Anaya oct 2021 , s/p bx in nov negative - fat necrosis \par has mammo and mri jan 2022 \par completed nerlynx ( 1 year ) ( may 2021) \par femara tolerating well \par Continue Femara\par repeat markers \par Discussed other options instead of continuing Zoladex which would include oophorectomy versus switching to tamoxifen and coming off the Zoladex for a while.  Patient is still anxious about coming off the Zoladex so we will continue for now.\par Patient was premenopausal prior to receiving chemotherapy so ovarian suppression is indicated in high risk patients to suppress ovarian function and reduce estrogen levels as demonstrated in the soft and text trial.\par Proceed with monthly Zoladex.\par \par \par 2) back pain \par stable, not worse \par PET/CT negative for metastases discussed with patient at length\par \par 3) lymphedema \par cont PT \par \par 4) chest pain \par not worse\par echo ok \par PET/CT negative for any evidence of lung or chest wall or bony disease discussed with patient\par s/p extensive cardiac evaluation\par \par 5) diarrhea \par stable\par again re-advised gi follow up as still having diarrhea , needs to have colonoscopy \par check mg,phos\par \par 6) obesity \par re-advised weight loss and exercise \par \par 7) chronic uti \par maintain urogyn appt\par on suppressive antibiotics \par \par 8) osteopenia \par Continue vitamin D and exercise\par dexa normal feb 2020  \par check every 2 year - due now \par \par 9) vit d def \par cont vit d \par \par 10) cellulitis right toe \par improved \par s/p fracture ,  follow up with podiatry \par \par 11) joint pains \par persistent, not worse\par could be due to ai or zoladex or other etiology ( autoimmune) \par  better on femara \par discussed switch to tamoxifen but patient declines \par cont femara \par had eval with rheum- pt reluctant to start plaquenil\par \par 12) headache \par improved \par \par \par dw patient at length \par Follow-up in 1 month \par

## 2022-02-08 NOTE — HISTORY OF PRESENT ILLNESS
[de-identified] : Ms Jonas is a 46 year old woman who was diagnosed with a pT1cN1 stage 2a , ER + IN+ HER 2 nicole positive breast cancer in October 2017, after self palpating a lump in the left. She had breast imaging at [facility] in [place]. Left breast biopsy was done at [facility] in [facility]par \par She had neoadjuvant chemotherapy with Dr Pacheco, 11/2017 TCHP had pCR  followed by a year completion of HP.  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 On b12 shots .  Bilateral mastectomies with reconstruction with Askikari at [facility] which revealed no evidence of residual disease. She underwent post-operative radiation therapy with Dr Purdy at [facility].  She underwent menopause during chemotherapy, and then menses resumed 2019.  She then started ovarian suppression with Zoladex in March of 2019. She  then began exemestane in 2019.  \par Hx of chest pain - [facility] \par \par Began Nerlynx in January of 2020 .  + diarrhea, intermittent toe infections.  She had several breaks in Nerlyx treatments because of infection and respiratory illness.  Tested negative for COVID. Total of 13 weeks of breaks from Nerlynx.  She believes due to complete late April 2021. currently on 4 pills a day \par \par There have been several post operative infections and complication requiring multiple reconstructive surgeries.  Chronic lymphedema of both arms, also has had several episodes of bilateral chest wall cellullitis.\par hx of uti on spressive nitrofuriton.  hx of high bp \par \par She is here to transfer care. \par \par Sees rheumatology. a lot of joint pains. \par \par PETCT negative for any mets or lesions jan 2021 \par \par saw Dr. Combs- did R axilla US due to palpable lymph node ( was not sure if this was r/t J&J shot)\par \par less gi symptoms now off nerlynx( may 2021)   [de-identified] : still feels tired\par saw rheum- still having joint pain - scared to start plaquenil. \par due for zoladex today. \par tolerating letrazole. \par due for GI/ colonscopy- still having diarrhea ( manageble with immodium)\par still on antibiotics- macrobid \par still taking probiotics \par scalp tenderness\par \par MRI breast 01/24/22- s/p bilateraly mastectomy with implant reconstruction with radial folds and no MRI evidence of intras or extracapsular implant rupture. At the inner upper aspect of the right breast where the patient reports a palpable abnormality there is a metallic marker from ultrasound guided biopsy and no residual abnormal enhancement in this region. \par

## 2022-02-09 LAB
CANCER AG15-3 SERPL-ACNC: 10.4 U/ML
CEA SERPL-MCNC: 1 NG/ML
ESTRADIOL SERPL-MCNC: <5 PG/ML

## 2022-03-09 ENCOUNTER — APPOINTMENT (OUTPATIENT)
Dept: HEMATOLOGY ONCOLOGY | Facility: CLINIC | Age: 47
End: 2022-03-09
Payer: COMMERCIAL

## 2022-03-09 VITALS
TEMPERATURE: 98.7 F | BODY MASS INDEX: 36.5 KG/M2 | WEIGHT: 206 LBS | SYSTOLIC BLOOD PRESSURE: 147 MMHG | DIASTOLIC BLOOD PRESSURE: 94 MMHG | HEIGHT: 63 IN | HEART RATE: 90 BPM | OXYGEN SATURATION: 97 % | RESPIRATION RATE: 18 BRPM

## 2022-03-09 PROCEDURE — 99214 OFFICE O/P EST MOD 30 MIN: CPT

## 2022-03-09 NOTE — ASSESSMENT
[FreeTextEntry1] :  THis is a 44 y/o woman with a hx of her positive er positive a pT1cN1 stage 2a  of left breast s/p neoadjuvant TCHP followed by bilateral mastecomty with node dissection , negative for residual disease , pCR. \par S/p 12 additional doses of Herceptin and perjeta. THen  radiation. Then started on nerlynx in jan 2020 . \par SHe also started on zoladex and exemestane in 2019.  Now on femara .\par Completed nerlynx may 2021 \par \par 1) breast cancer \par doing well overalll , DUYEN \par PET/CT negative for any evidence of metastases, jan 2021\par tolerating  zoladex well \par s/p follow-up with Dr. Aleyda Anaya oct 2021 , s/p bx in nov negative - fat necrosis \par has mammo and mri jan 2022 \par completed nerlynx ( 1 year ) ( may 2021) \par markers negative \par Patient continues to have a lot of joint pain we discussed switching from letrozole to anastrozole she has already tried exemestane.\par Patient will discontinue letrozole for 1 week to see if her symptoms improve and then let me know if they are better then we will switch to anastrozole.\par Repeat labs today including CBC CMP and markers\par Proceed with Zoladex\par \par \par \par 2) back pain \par PET/CT negative for metastases discussed with patient at length\par stable , no increase \par \par \par 3) lymphedema \par cont PT \par \par 4) chest pain \par echo ok \par PET/CT negative for any evidence of lung or chest wall or bony disease discussed with patient\par s/p extensive cardiac evaluation\par \par 5) diarrhea \par continues \par again advise gi follow up as still having diarrhea , needs to have colonoscopy \par \par 6) obesity \par advise weight loss and exercise \par \par 7) chronic uti \par s/p follow up with urogyn \par on suppressive antibitoics \par \par \par 8) osteopenia \par Continue vitamin D and exercise\par pt did dexa get results and review \par \par 9) vit d def \par cont vit d , check level \par \par 10) cellultitis right toe \par improved \par s/p fracture ,  follow up with podiatry \par \par 11) joint pains \par could be due to ai or zoladex or other etiology ( autoimmune) \par dc for one week then possibly switch to anastrozole \par \par 12) headache \par improved \par \par 13) fatigue \par repeat b12 folate and iron levels \par \par dw patient at length \par Follow-up in 1 month \par

## 2022-03-09 NOTE — HISTORY OF PRESENT ILLNESS
[de-identified] : Ms Jonas is a 45 year old woman who was diagnosed with a pT1cN1 stage 2a , ER + [place]+ HER 2 nicole positive breast cancer in October 2017, after self palpating a lump in the left. She had breast imaging at [facility] in [place]. [place] breast [place] [facility] [place] [facility] \par She had neoadjuvant chemotherapy with Dr Pacheco, 11/2017 TCHP had pCR  followed by a year completion of HP.  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 On b12 shots .  Bilateral mastectomies with reconstruction with Askikari at [facility] which revealed no evidence of residual disease. She underwent post-operative radiation therapy with Dr Purdy at [facility].  She underwent menopause during chemotherapy, and then menses resumed 2019.  She then started ovarian suppression with Zoladex in March of 2019. She  then began exemestane in 2019.  \par Hx of chest pain - [facility] \par \par Began Nerlynx in January of 2020 .  + diarrhea, intermittent toe infections.  She had several breaks in Nerlyx treatments because of infection and respiratory illness.  Tested negative for COVID. Total of 13 weeks of breaks from Nerlynx.  She believes due to complete late April 2021. currently on 4 pills a day \par \par There have been several post operative infections and complication requiring multiple reconstructive surgeries.  Chronic lymphedema of both arms, also has had several episodes of bilateral chest wall cellullitis.\par hx of uti on spressive nitrofuriton.  hx of high bp \par \par She is here to transfer care. \par \par Sees rheumatology. a lot of joint pains. \par \par PETCT negative for any mets or lesions jan 2021 \par \par saw Dr. Combs- did R axilla US due to palpable lymph node ( was not sure if this was r/t J&J shot)\par \par less gi symptoms now off nerlynx( may 2021)   [de-identified] : \par went to [facility] - dexa \par joint pains \par will be seeing rheumatology  doesn’t want plaqunil  concerned about side effects \par mri breast mammo 1/22/22\par diarrhea not any beter , has not seen gi yet \par went to urology , still on nitrofuritin \par cranberry supplements \par zoladex today \par still  having a lot of joint pains

## 2022-03-11 LAB
25(OH)D3 SERPL-MCNC: 33.1 NG/ML
ALBUMIN SERPL ELPH-MCNC: 4.4 G/DL
ALP BLD-CCNC: 92 U/L
ALT SERPL-CCNC: 12 U/L
ANION GAP SERPL CALC-SCNC: 16 MMOL/L
APPEARANCE: CLEAR
AST SERPL-CCNC: 13 U/L
BACTERIA UR CULT: NORMAL
BILIRUB SERPL-MCNC: 0.4 MG/DL
BILIRUBIN URINE: NEGATIVE
BLOOD URINE: NEGATIVE
BUN SERPL-MCNC: 20 MG/DL
CALCIUM SERPL-MCNC: 9.2 MG/DL
CANCER AG15-3 SERPL-ACNC: 9.8 U/ML
CEA SERPL-MCNC: 1 NG/ML
CHLORIDE SERPL-SCNC: 103 MMOL/L
CO2 SERPL-SCNC: 26 MMOL/L
COLOR: YELLOW
CREAT SERPL-MCNC: 0.79 MG/DL
EGFR: 93 ML/MIN/1.73M2
FERRITIN SERPL-MCNC: 55 NG/ML
FOLATE SERPL-MCNC: >20 NG/ML
GLUCOSE QUALITATIVE U: NEGATIVE
GLUCOSE SERPL-MCNC: 77 MG/DL
IRON SATN MFR SERPL: 32 %
IRON SERPL-MCNC: 85 UG/DL
KETONES URINE: NEGATIVE
LEUKOCYTE ESTERASE URINE: NEGATIVE
NITRITE URINE: NEGATIVE
PH URINE: 6
POTASSIUM SERPL-SCNC: 4.5 MMOL/L
PROT SERPL-MCNC: 6.7 G/DL
PROTEIN URINE: NEGATIVE
SODIUM SERPL-SCNC: 144 MMOL/L
SPECIFIC GRAVITY URINE: 1.02
TIBC SERPL-MCNC: 264 UG/DL
TSH SERPL-ACNC: 1.44 UIU/ML
UIBC SERPL-MCNC: 179 UG/DL
UROBILINOGEN URINE: NORMAL
VIT B12 SERPL-MCNC: 962 PG/ML

## 2022-04-07 ENCOUNTER — APPOINTMENT (OUTPATIENT)
Dept: BREAST CENTER | Facility: CLINIC | Age: 47
End: 2022-04-07
Payer: COMMERCIAL

## 2022-04-07 ENCOUNTER — LABORATORY RESULT (OUTPATIENT)
Age: 47
End: 2022-04-07

## 2022-04-07 ENCOUNTER — APPOINTMENT (OUTPATIENT)
Dept: HEMATOLOGY ONCOLOGY | Facility: CLINIC | Age: 47
End: 2022-04-07
Payer: COMMERCIAL

## 2022-04-07 VITALS
RESPIRATION RATE: 18 BRPM | HEART RATE: 85 BPM | WEIGHT: 206 LBS | OXYGEN SATURATION: 95 % | BODY MASS INDEX: 36.5 KG/M2 | SYSTOLIC BLOOD PRESSURE: 130 MMHG | DIASTOLIC BLOOD PRESSURE: 92 MMHG | TEMPERATURE: 98.4 F | HEIGHT: 63 IN

## 2022-04-07 VITALS
BODY MASS INDEX: 36.49 KG/M2 | WEIGHT: 206 LBS | DIASTOLIC BLOOD PRESSURE: 87 MMHG | HEART RATE: 95 BPM | OXYGEN SATURATION: 97 % | SYSTOLIC BLOOD PRESSURE: 134 MMHG

## 2022-04-07 DIAGNOSIS — Z85.3 PERSONAL HISTORY OF MALIGNANT NEOPLASM OF BREAST: ICD-10-CM

## 2022-04-07 PROCEDURE — 99213 OFFICE O/P EST LOW 20 MIN: CPT

## 2022-04-07 PROCEDURE — 99214 OFFICE O/P EST MOD 30 MIN: CPT

## 2022-04-07 NOTE — PHYSICAL EXAM
[Normocephalic] : normocephalic [Atraumatic] : atraumatic [EOMI] : extra ocular movement intact [Supple] : supple [No Supraclavicular Adenopathy] : no supraclavicular adenopathy [No Cervical Adenopathy] : no cervical adenopathy [Examined in the supine and seated position] : examined in the supine and seated position [No dominant masses] : no dominant masses in right breast  [No dominant masses] : no dominant masses left breast [Breast Mass Right Breast ___cm] : no masses [Breast Mass Left Breast ___cm] : no masses [___cm] : ~M [unfilled] ~Ucm upper inner quadrant mass [No Axillary Lymphadenopathy] : no left axillary lymphadenopathy [No Rashes] : no rashes [No Ulceration] : no ulceration [de-identified] : On exam, the patient has obvious bilateral mastectomies with implant reconstructions with a Nielsen pattern incision.  I cannot feel any suspicious densities over either implant.  She does have a slightly enlarged lymph node felt higher up in the right axilla but this is fairly soft and slightly tender.  She did get her COVID vaccination second dose in March 2021.  She has no supraclavicular, or cervical adenopathy.  Her left skin redness has improved and the previous medial density is no longer palpable. [de-identified] : Status post mastectomy and implant reconstruction with Wise pattern approach [de-identified] : Status post mastectomy and implant reconstruction with Wise pattern approach.  Improved redness over upper aspect of the left implant with resolution of density [de-identified] : Soft slightly enlarged right axillary lymph node [de-identified] : Stable upper extremity mild lymphedema

## 2022-04-07 NOTE — ASSESSMENT
[FreeTextEntry1] : The patient is a 46-year-old  still premenopausal white female of Georgian, English, swished, Palauan, Darlin, Syriac, and  descent.  She underwent menarche at age 12 and had her first child at age 32.  She has a strong family history of breast and ovarian cancer with her maternal great aunt who had ovarian cancer who passed away.  She has a maternal second cousin who had breast cancer in her late 40s.  Her paternal grandfather passed away from colorectal cancer at age 65.  Her maternal grandfather had colon cancer around age 75.  She has 4 maternal great uncles who had esophageal and throat cancer and a paternal great aunt who had uterine/cervical cancer.  A paternal great uncle had lung cancer.  The patient underwent bilateral augmentation implants in  requiring multiple replacements for ruptures last exchanged in .  She noticed a density towards the lateral aspect of the left breast and mammography showed suspicious left retroareolar calcifications.  Ultrasound showed multiple suspicious areas in the left breast 2:00, 3:00, as well as a suspicious left axillary lymph node.  Ultrasound-guided core biopsies on 2017 showed the left retroareolar density to be an intermediate grade invasive duct cancer with surrounding DCIS which was ER positive at 80%, HI negative, and HER-2 3+ by IHC.  A separate left breast 2:00 density 5 cm from the nipple turned out to be metastatic cancer in an intramammary lymph node which was ER/HI positive and HER-2 positive.  The left axillary suspicious lymph node was also positive.  She had clips placed on all the biopsied lesions as well as in a separate left breast 2:00 and 9:00 density which turned out to be a fibroadenoma and fibrocystic change.  An MRI performed on 2017 showed the right breast to be negative but the left breast showed extensive enhancement and PET scan showed no distant disease.  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  She underwent neoadjuvant TCHP chemotherapy through Dr. Pacheco.  MRI after neoadjuvant chemotherapy on 2018 showed no further enhancement.  I then performed bilateral total mastectomies through a Wise reduction pattern incision on April 10, 2018 and she had bilateral expander reconstructions with AlloDerm.  She had a sentinel lymph node biopsy with localization of the nodes preoperatively and both the intramammary as well as axillary lymph nodes were all negative.  She ended up with another 12 nodes removed which were negative since the localization was not perfectly placed.  The left mastectomy showed no residual cancer and the right mastectomy was negative.  She did undergo a small wound revision of the left breast by Dr. Nava on May 23, 2018.  She is following up with Dr. Pacheco and finished Herceptin and Perjeta and underwent postmastectomy radiation therapy at [facility] which finished on 2018.  She developed some redness in the left breast and was admitted to [place] and placed on IV vancomycin for 3 days and the redness resolved.  She developed bilateral upper extremity lymphedema for which she wears compression sleeves.  She developed redness over the left implant and was placed on Levaquin in 2018 and the redness improved.  She again was placed on Cipro in 2019 for some cellulitis over the right implant.  She underwent fat grafting and removal of her port in 2019 and then had her implants removed and exchanged by Dr. Ye in 2019 for cosmesis.  She now follows up with Dr. Holly Walker and is continuing on Zoladex as well as Aromasin and received neratinib which finished in May 2021.  She did have some neuropathy and follows up with Dr. Stephen.  On exam, I cannot feel any suspicious densities over either implant.  She has stable bilateral arm lymphedema.  There was a small density felt over the upper inner aspect of the left implant and I sent her for directed left breast ultrasound which was performed on 3/22/2021 showing no suspicious findings in that area.  Her redness has since improved and the density is no longer palpable.  She did have this soft palpable lymph node in the upper right axillary region but directed axillary ultrasound on May 7, 2021 only showed normal axillary lymph nodes.  She did have her 2nd COVID vaccination in 2021.  The patient then underwent a bilateral breast MRI ordered by medical oncology on 2021 which showed some enhancement in the upper inner aspect of the right breast and she was sent for targeted mammography and ultrasound of the right breast on 2021 just showing some developing fat necrosis likely from her fat injections in the past.  A follow-up diagnostic MRI and right breast mammography was performed on 2022 and the mammography just showed a "coil" clip in the right breast around a previously biopsied benign density and the MRI showed no suspicious findings.  She also had a follow-up diagnostic right breast ultrasound on 2022 performed here [facility] just showing this right breast 2:00 previously biopsied area of fat necrosis to have decreased in size.  She was reassured and can just follow-up again in 6 months.   Otherwise, she should remain on Zoladex and Aromasin, through Dr. Walker.

## 2022-04-07 NOTE — REASON FOR VISIT
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  The cancer was ER positive, CO negative, and HER-2 3+ and she underwent neoadjuvant TCHP chemotherapy through Dr. Pacheco.  She had bilateral total mastectomies through a Wise reduction pattern on April 10, 2018 and had bilateral expander reconstructions with AlloDerm.  Her nodes were all negative after neoadjuvant chemotherapy.  She finished Herceptin and Perjeta and had postmastectomy radiation and did develop some bouts of cellulitis and bilateral arm lymphedema.  She had her expanders exchanged for implants in 2019 and is currently on Zoladex, Aromasin, and is on neratinib and comes in for an interval follow-up with a questionable palpable right axillary lymph node felt by her medical oncologist.

## 2022-04-07 NOTE — HISTORY OF PRESENT ILLNESS
[FreeTextEntry1] : The patient is a 46-year-old  still premenopausal white female of Mongolian, English, swished, Tongan, Darlin, Wolof, and  descent.  She underwent menarche at age 12 and had her first child at age 32.  She has a strong family history of breast and ovarian cancer with her maternal great aunt who had ovarian cancer who passed away.  She has a maternal second cousin who had breast cancer in her late 40s.  Her paternal grandfather passed away from colorectal cancer at age 65.  Her maternal grandfather had colon cancer around age 75.  She has 4 maternal great uncles who had esophageal and throat cancer and a paternal great aunt who had uterine/cervical cancer.  A paternal great uncle had lung cancer.  The patient underwent bilateral augmentation implants in  requiring multiple replacements for ruptures last exchanged in .  She noticed a density towards the lateral aspect of the left breast and mammography showed suspicious left retroareolar calcifications.  Ultrasound showed multiple suspicious areas in the left breast 2:00, 3:00, as well as a suspicious left axillary lymph node.  Ultrasound-guided core biopsies on 2017 showed the left retroareolar density to be an intermediate grade invasive duct cancer with surrounding DCIS which was ER positive at 80%, KY negative, and HER-2 3+ by IHC.  A separate left breast 2:00 density 5 cm from the nipple turned out to be metastatic cancer in an intramammary lymph node which was ER/KY positive and HER-2 positive.  The left axillary suspicious lymph node was also positive.  She had clips placed on all the biopsied lesions as well as in a separate left breast 2:00 and 9:00 density which turned out to be a fibroadenoma and fibrocystic change.  An MRI performed on 2017 showed the right breast to be negative but the left breast showed extensive enhancement and PET scan showed no distant disease.  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  She underwent neoadjuvant TCHP chemotherapy through Dr. Pacheco.  MRI after neoadjuvant chemotherapy on 2018 showed no further enhancement.  I then performed bilateral total mastectomies through a Wise reduction pattern incision on April 10, 2018 and she had bilateral expander reconstructions with AlloDerm.  She had a sentinel lymph node biopsy with localization of the nodes preoperatively and both the intramammary as well as axillary lymph nodes were all negative.  She ended up with another 12 nodes removed which were negative since the localization was not perfectly placed.  The left mastectomy showed no residual cancer and the right mastectomy was negative.  She did undergo a small wound revision of the left breast by Dr. Nava on May 23, 2018.  She is following up with Dr. Pacheco and finished Herceptin and Perjeta and underwent postmastectomy radiation therapy at [facility] which finished on 2018.  She developed some redness in the left breast and was admitted to [place] and placed on IV vancomycin for 3 days and the redness resolved.  She developed bilateral upper extremity lymphedema for which she wears compression sleeves.  She developed redness over the left implant and was placed on Levaquin in 2018 and the redness improved.  She again was placed on Cipro in 2019 for some cellulitis over the right implant.  She underwent fat grafting and removal of her port in 2019 and then had her implants removed and exchanged by Dr. Ye in 2019 for cosmesis.  She now follows up with Dr. Holly Walker and is continuing on Zoladex as well as Aromasin and finished neratinib in May 2021.  She did have some neuropathy and follows up with Dr. Stephen.  She had a questionable lymph node felt in her lower right axilla in May 2021 but directed ultrasound just showed normal lymph nodes.  MRI on 2021 showed some enhancement in the upper inner aspect of the right breast but mammography and ultrasound on 2021 and follow-up MRI in 2022 just showed some areas of fat necrosis.  She comes in now for routine follow-up.

## 2022-04-08 LAB
COVID-19 NUCLEOCAPSID  GAM ANTIBODY INTERPRETATION: NEGATIVE
COVID-19 SPIKE DOMAIN ANTIBODY INTERPRETATION: POSITIVE
SARS-COV-2 AB SERPL IA-ACNC: >250 U/ML
SARS-COV-2 AB SERPL QL IA: 0.07 INDEX

## 2022-04-13 NOTE — REVIEW OF SYSTEMS
[Diarrhea] : diarrhea [Joint Pain] : joint pain [Negative] : Allergic/Immunologic [Joint Stiffness] : joint stiffness [Anxiety] : anxiety [Hot Flashes] : hot flashes [FreeTextEntry9] : chronic joint pain- not worse. just persistent.

## 2022-04-13 NOTE — PHYSICAL EXAM
[Normal] : affect appropriate [de-identified] : b/l breast with obvious implants s/p b/l mastectomy. some density noted to left breast likely scar tissue. no obvious masses palpated. no lymphadenopathy to b/l axilla.  [de-identified] : no point tenderness noted upon palpation.

## 2022-04-13 NOTE — HISTORY OF PRESENT ILLNESS
[de-identified] : Ms Jonas is a 46 year old woman who was diagnosed with a pT1cN1 stage 2a , ER + MI+ HER 2 nicole positive breast cancer in October 2017, after self palpating a lump in the left. She had breast imaging at [facility] in [place]. Left breast biopsy was done at [facility] in [facility]par \par She had neoadjuvant chemotherapy with Dr Pacheco, 11/2017 TCHP had pCR  followed by a year completion of HP.  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 On b12 shots .  Bilateral mastectomies with reconstruction with Askikari at [facility] which revealed no evidence of residual disease. She underwent post-operative radiation therapy with Dr Purdy at [facility].  She underwent menopause during chemotherapy, and then menses resumed 2019.  She then started ovarian suppression with Zoladex in March of 2019. She  then began exemestane in 2019.  \par Hx of chest pain - [facility] \par \par Began Nerlynx in January of 2020 .  + diarrhea, intermittent toe infections.  She had several breaks in Nerlyx treatments because of infection and respiratory illness.  Tested negative for COVID. Total of 13 weeks of breaks from Nerlynx.  She believes due to complete late April 2021. currently on 4 pills a day \par \par There have been several post operative infections and complication requiring multiple reconstructive surgeries.  Chronic lymphedema of both arms, also has had several episodes of bilateral chest wall cellullitis.\par hx of uti on spressive nitrofuriton.  hx of high bp \par \par She is here to transfer care. \par \par Sees rheumatology. a lot of joint pains. \par \par PETCT negative for any mets or lesions jan 2021 \par \par saw Dr. Combs- did R axilla US due to palpable lymph node ( was not sure if this was r/t J&J shot)\par \par less gi symptoms now off nerlynx( may 2021)   [de-identified] : decided not to switch to AI. still wants to take letrazole. \par will make appt for colonscopy \par \par dexa 03/07/22- still normal ( however lumbar spine and left femoral neck got worse from prior).

## 2022-04-13 NOTE — ASSESSMENT
[FreeTextEntry1] :  THis is a 45 y/o woman with a hx of her positive er positive a pT1cN1 stage 2a  of left breast s/p neoadjuvant TCHP followed by bilateral mastecomty with node dissection , negative for residual disease , pCR. \par S/p 12 additional doses of Herceptin and perjeta. THen  radiation. Then started on nerlynx in jan 2020 . \par SHe also started on zoladex and exemestane in 2019.  Now on femara .\par Completed nerlynx may 2021 \par \par 1) breast cancer \par doing well overalll , DUYEN \par PET/CT negative for any evidence of metastases, jan 2021\par tolerating  zoladex well \par s/p follow-up with Dr. Aleyda Anaya oct 2021 , s/p bx in nov negative - fat necrosis \par has mammo and mri jan 2022 \par completed nerlynx ( 1 year ) ( may 2021) \par markers negative \par Patient continues to have a lot of joint pain we discussed switching from letrozole to anastrozole she has already tried exemestane.\par Patient will discontinue letrozole for 1 week to see if her symptoms improve and then let me know if they are better then we will switch to anastrozole.\par pt stopped letrazole for one week with mild improvement. She still wants to proceed with letrazole \par Repeat labs today including CBC CMP and markers\par Proceed with Zoladex\par \par \par 2) back pain \par PET/CT negative for metastases discussed with patient at length\par stable , no increase \par \par 3) lymphedema \par cont PT \par \par 4) chest pain \par resolved\par echo ok \par PET/CT negative for any evidence of lung or chest wall or bony disease discussed with patient\par s/p extensive cardiac evaluation\par \par 5) diarrhea \par continues \par again re-advised gi follow up as still having diarrhea , needs to have colonoscopy \par \par 6) obesity \par re-advised weight loss and exercise \par \par 7) chronic uti \par s/p follow up with urogyn \par on suppressive antibiotics\par check UA today \par \par 8) osteopenia \par Continue vitamin D and exercise\par Reviewed recent dexa 03/2022- still normal however T score is worse than prior likely r/t to AI. We discussed zometa and prolia, including side effects such as muscle aches, flu like symptoms and risk of ONJ . I also discussed risk of renal insufficiency with zometa. Patient would like to think about it and contact office once she has made a decision. \par \par 9) vit d def \par cont vit d\par \par 10) cellultitis right toe \par resolved\par s/p fracture ,  follow up with podiatry \par \par 11) joint pains \par could be due to ai or zoladex or other etiology ( autoimmune) \par dc for one week then possibly switch to anastrozole \par pt wants to continue with letrazole\par \par 12) headache \par improved \par \par 13) fatigue \par work up reviewed\par ferritin 55 . prior level 81 in 12/2021\par consider oral iron\par strongly re-advised colonscopy\par \par dw patient at length \par Follow-up in 1 month \par

## 2022-05-05 ENCOUNTER — APPOINTMENT (OUTPATIENT)
Dept: HEMATOLOGY ONCOLOGY | Facility: CLINIC | Age: 47
End: 2022-05-05
Payer: COMMERCIAL

## 2022-05-05 VITALS
HEART RATE: 82 BPM | RESPIRATION RATE: 18 BRPM | WEIGHT: 208 LBS | DIASTOLIC BLOOD PRESSURE: 95 MMHG | HEIGHT: 63 IN | OXYGEN SATURATION: 98 % | BODY MASS INDEX: 36.86 KG/M2 | TEMPERATURE: 98.8 F | SYSTOLIC BLOOD PRESSURE: 143 MMHG

## 2022-05-05 PROCEDURE — 99214 OFFICE O/P EST MOD 30 MIN: CPT

## 2022-05-05 NOTE — ASSESSMENT
[FreeTextEntry1] :  THis is a 46 y/o woman with a hx of her positive er positive a pT1cN1 stage 2a  of left breast s/p neoadjuvant TCHP followed by bilateral mastecomty with node dissection , negative for residual disease , pCR. \par S/p 12 additional doses of Herceptin and perjeta. THen  radiation. Then started on nerlynx in jan 2020 . \par SHe also started on zoladex and exemestane in 2019.  Now on femara .\par Completed nerlynx may 2021 \par \par 1) breast cancer \par doing well overalll , DUYEN \par PET/CT negative for any evidence of metastases, jan 2021\par tolerating  zoladex well \par s/p follow-up with Dr. Aleyda Anaya oct 2021 , s/p bx in nov negative - fat necrosis \par has mammo and mri jan 2022 \par completed nerlynx ( 1 year ) ( may 2021) \par markers negative , repeat \par back on letrozole , tolerating well , cont letrozole \par Proceed with Zoladex\par \par \par \par 2) back pain \par PET/CT negative for metastases discussed with patient at length\par stable , no increase \par \par \par 3) lymphedema \par cont PT \par \par 4) chest pain \par echo ok \par PET/CT negative for any evidence of lung or chest wall or bony disease discussed with patient\par s/p extensive cardiac evaluation\par \par 5) diarrhea \par needs to see gi for repeat colonsocpy dw patient \par \par 6) obesity \par advise weight loss and exercise \par \par 7) chronic uti \par s/p follow up with urogyn \par on suppressive antibitoics \par \par \par 8) osteopenia \par Continue vitamin D and exercise\par dexa shows osteopenia but it is worse , consider prolia or zometa once more stable \par \par 9) vit d def \par cont vit d , check level \par \par 10) cellultitis right toe \par improved \par s/p fracture ,  follow up with podiatry \par \par 11) joint pains \par could be due to ai or zoladex or other etiology ( autoimmune) \par no improvement with holding femara \par cont femara \par follow up rheumatolgoy  encouraged \par \par 12) headache \par improved \par \par 13) fatigue \par likely due to covid \par \par 14) covid\par recoverd s/p paxlovid \par \par dw patient at length \par Follow-up in 1 month ( for zoladex) can see MD/PA in 3 months \par

## 2022-05-05 NOTE — HISTORY OF PRESENT ILLNESS
[de-identified] : Ms Jonas is a 45 year old woman who was diagnosed with a pT1cN1 stage 2a , ER + HI+ HER 2 nicole positive breast cancer in October 2017, after self palpating a lump in the left. She had breast imaging at [facility] in [place]. Left breast biopsy was done at [facility] in [facility].\par \par She had neoadjuvant chemotherapy with Dr Pacheco, 11/2017 TCHP had pCR  followed by a year completion of HP.  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 On b12 shots .  Bilateral mastectomies with reconstruction with Askikari at [facility] which revealed no evidence of residual disease. She underwent post-operative radiation therapy with Dr Purdy at [facility].  She underwent menopause during chemotherapy, and then menses resumed 2019.  She then started ovarian suppression with Zoladex in March of 2019. She  then began exemestane in 2019.  \par Hx of chest pain - [facility] \par \par Began Nerlynx in January of 2020 .  + diarrhea, intermittent toe infections.  She had several breaks in Nerlyx treatments because of infection and respiratory illness.  Tested negative for COVID. Total of 13 weeks of breaks from Nerlynx.  She believes due to complete late April 2021. currently on 4 pills a day \par \par There have been several post operative infections and complication requiring multiple reconstructive surgeries.  Chronic lymphedema of both arms, also has had several episodes of bilateral chest wall cellullitis.\par hx of uti on spressive nitrofuriton.  hx of high bp \par \par She is here to transfer care. \par \par Sees rheumatology. a lot of joint pains. \par \par PETCT negative for any mets or lesions jan 2021 \par \par saw Dr. Combs- did R axilla US due to palpable lymph node ( was not sure if this was r/t J&J shot)\par \par less gi symptoms now off nerlynx( may 2021)  \par \par mri breast mammo 1/22/22 [de-identified] : here for zoladex \par had covid ( april 15) \par we paxlovid \par still feeling very tired , cough  is better \par throat still scratchy \par headache continues \par started 5000 vit d \par stopped letrozole for a week no major change, now back on it \par saw rheumatolgy

## 2022-05-05 NOTE — REVIEW OF SYSTEMS
[Fever] : no fever [Fatigue] : fatigue [Joint Pain] : joint pain [Negative] : Allergic/Immunologic [FreeTextEntry7] : diarrhea

## 2022-05-15 LAB — BACTERIA UR CULT: NORMAL

## 2022-06-01 ENCOUNTER — APPOINTMENT (OUTPATIENT)
Dept: HEMATOLOGY ONCOLOGY | Facility: CLINIC | Age: 47
End: 2022-06-01
Payer: COMMERCIAL

## 2022-06-01 PROCEDURE — 36415 COLL VENOUS BLD VENIPUNCTURE: CPT

## 2022-06-01 PROCEDURE — 99214 OFFICE O/P EST MOD 30 MIN: CPT | Mod: 25

## 2022-06-02 LAB
B BURGDOR AB SER-IMP: NEGATIVE
B BURGDOR IGG+IGM SER QL: 0.22 INDEX

## 2022-06-14 LAB
CANCER AG15-3 SERPL-ACNC: 11 U/ML
CEA SERPL-MCNC: 1.6 NG/ML

## 2022-06-28 ENCOUNTER — LABORATORY RESULT (OUTPATIENT)
Age: 47
End: 2022-06-28

## 2022-06-28 ENCOUNTER — APPOINTMENT (OUTPATIENT)
Dept: HEMATOLOGY ONCOLOGY | Facility: CLINIC | Age: 47
End: 2022-06-28
Payer: COMMERCIAL

## 2022-06-28 VITALS
RESPIRATION RATE: 18 BRPM | SYSTOLIC BLOOD PRESSURE: 161 MMHG | OXYGEN SATURATION: 98 % | DIASTOLIC BLOOD PRESSURE: 98 MMHG | WEIGHT: 207 LBS | HEART RATE: 84 BPM | BODY MASS INDEX: 36.68 KG/M2 | TEMPERATURE: 99.2 F | HEIGHT: 63 IN

## 2022-06-28 VITALS
WEIGHT: 207 LBS | RESPIRATION RATE: 18 BRPM | HEIGHT: 63 IN | SYSTOLIC BLOOD PRESSURE: 161 MMHG | BODY MASS INDEX: 36.68 KG/M2 | OXYGEN SATURATION: 98 % | TEMPERATURE: 99.2 F | DIASTOLIC BLOOD PRESSURE: 98 MMHG | HEART RATE: 84 BPM

## 2022-06-28 PROCEDURE — 99214 OFFICE O/P EST MOD 30 MIN: CPT | Mod: 25

## 2022-06-28 PROCEDURE — 36415 COLL VENOUS BLD VENIPUNCTURE: CPT

## 2022-06-28 NOTE — ASSESSMENT
[FreeTextEntry1] :  THis is a 46 y/o woman with a hx of her positive er positive a pT1cN1 stage 2a diagnosed October 2017 of left breast s/p neoadjuvant TCHP followed by bilateral mastectomy with node dissection , negative for residual disease , pCR. \par S/p 12 additional doses of Herceptin and perjeta. THen  radiation. Then started on nerlynx in jan 2020 . \par SHe also started on zoladex and exemestane in 2019.  Now on femara July 2021 .\par Completed nerlynx May 2021 \par \par # breast cancer \par breast s/p neoadjuvant TCHP followed by bilateral mastectomy with node dissection , negative for residual disease , pCR. \par S/p 12 additional doses of Herceptin and perjeta\par doing well overalll , DUYEN \par PET/CT negative for any evidence of metastases, jan 2021\par tolerating  zoladex well \par s/p follow-up with Dr. Combs oct 2021 , s/p bx in nov negative - fat necrosis \par has mammo and mri jan 2022 \par completed nerlynx ( 1 year ) ( may 2021) \par back on letrozole , tolerating well , cont letrozole - started AI 2019\par Proceed with Zoladex 3.6 mg monthly. \par C/o weight gain \par DEXA 3/22 - reviewed. normal\par \par # back pain \par worsening. check L spine xray\par ALKP wnl\par \par # lymphedema \par cont PT \par \par # obesity \par advise weight loss and exercise \par endo evaluation for [place]\par \par # osteopenia \par Continue vitamin D and exercise\par dexa shows osteopenia but it is worse , consider prolia or zometa once more stable \par \par # vit d def - 33, 5 k daily \par cont vit d , check level \par \par # joint pains \par could be due to ai or zoladex or other etiology ( autoimmune) \par no improvement with holding femara \par cont femara \par follow up rheumatology  encouraged \par \par # fatigue \par likely due to covid \par \par RTC in 1 month with zoladex - CBC with diff, cmp, vit D

## 2022-06-28 NOTE — HISTORY OF PRESENT ILLNESS
[de-identified] : Ms Jonas is a 45 year old woman who was diagnosed with a pT1cN1 stage 2a , ER + [place]+ HER 2 nicole positive breast cancer in October 2017, after self palpating a lump in the left. She had breast imaging at [facility] in [place]. Left breast biopsy was done at [facility] in [facility] [place]par \par She had neoadjuvant chemotherapy with Dr Pacheco, 11/2017 [facility] had pCR  followed by a year completion of HP.  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 On b12 shots .  Bilateral mastectomies with reconstruction with Askikari at [facility] which revealed no evidence of residual disease. She underwent post-operative radiation therapy with Dr Purdy at [facility].  She underwent menopause during chemotherapy, and then menses resumed 2019.  She then started ovarian suppression with Zoladex in March of 2019. She  then began exemestane in 2019.  \par Hx of chest pain - [facility] \par \par Began Nerlynx in January of 2020 .  + diarrhea, intermittent toe infections.  She had several breaks in Nerlyx treatments because of infection and respiratory illness.  Tested negative for COVID. Total of 13 weeks of breaks from Nerlynx.  She believes due to complete late April 2021. currently on 4 pills a day \par \par There have been several post operative infections and complication requiring multiple reconstructive surgeries.  Chronic lymphedema of both arms, also has had several episodes of bilateral chest wall cellullitis.\par hx of uti on spressive nitrofuriton.  hx of high bp \par \par She is here to transfer care. \par \par Sees rheumatology. a lot of joint pains. \par \par PETCT negative for any mets or lesions jan 2021 \par \par saw Dr. Combs- did R axilla US due to palpable lymph node ( was not sure if this was r/t J&J shot)\par \par less gi symptoms now off nerlynx( may 2021)  \par \par mri breast mammo 1/22/22 [de-identified] : Patient seen and examined and here today for follow up\par Here for zoladex \par On Letrozole. Complains of joint pains\par Gained weight\par complains of back pain\par

## 2022-06-28 NOTE — ASSESSMENT
[FreeTextEntry1] :  THis is a 44 y/o woman with a hx of her positive er positive a pT1cN1 stage 2a diagnosed October 2017 of left breast s/p neoadjuvant TCHP followed by bilateral mastectomy with node dissection , negative for residual disease , pCR. \par S/p 12 additional doses of Herceptin and perjeta. THen  radiation. Then started on nerlynx in jan 2020 . \par SHe also started on zoladex and exemestane in 2019.  Now on femara July 2021 .\par Completed nerlynx May 2021 \par \par # breast cancer \par doing well overalll , DUYEN \par PET/CT negative for any evidence of metastases, jan 2021\par tolerating  zoladex well \par s/p follow-up with Dr. Combs oct 2021 , s/p bx in nov negative - fat necrosis \par has mammo and mri jan 2022 \par completed nerlynx ( 1 year ) ( may 2021) \par markers negative , repeat \par back on letrozole , tolerating well , cont letrozole \par Proceed with Zoladex 3.6 mg monthly. \par Nitrofurantoin for UTI - stopped \par C/o weight gain \par Dexa 3/22\par \par \par # back pain \par PET/CT negative for metastases discussed with patient at length\par stable , no increase \par \par # lymphedema \par cont PT \par \par # chest pain \par echo ok \par PET/CT negative for any evidence of lung or chest wall or bony disease discussed with patient\par s/p extensive cardiac evaluation\par \par #  diarrhea \par needs to see gi for repeat colonoscopy dw patient \par \par # obesity \par advise weight loss and exercise \par endo evaluation for Wegowy\par \par # UTI - NItrofurantoin prophylaxis \par \par # osteopenia \par Continue vitamin D and exercise\par dexa shows osteopenia but it is worse , consider prolia or zometa once more stable \par \par # vit d def - 33, 5 k daily \par cont vit d , check level \par \par # cellulitis right toe - healed after surgery \par improved \par s/p fracture ,  follow up with podiatry \par \par # joint pains \par could be due to ai or zoladex or other etiology ( autoimmune) \par no improvement with holding femara \par cont femara \par follow up rheumatology  encouraged \par \par # fatigue \par likely due to covid \par \par Reviewed\par Follow-up in 1 month ( for zoladex) can see MD/PA in 3 months \par

## 2022-06-28 NOTE — HISTORY OF PRESENT ILLNESS
[de-identified] : Ms Jonas is a 45 year old woman who was diagnosed with a pT1cN1 stage 2a , ER + MD+ HER 2 nicole positive breast cancer in October 2017, after self palpating a lump in the left. She had breast imaging at [facility] in [place]. [place] breast [place] [facility] [place] [facility] \par She had neoadjuvant chemotherapy with Dr Pacheco, 11/2017 [facility]P had pCR  followed by a year completion of HP.  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 On b12 shots .  Bilateral mastectomies with reconstruction with Askikari at [facility] which revealed no evidence of residual disease. She underwent post-operative radiation therapy with Dr Purdy at [facility].  She underwent menopause during chemotherapy, and then menses resumed 2019.  She then started ovarian suppression with Zoladex in March of 2019. She  then began exemestane in 2019.  \par Hx of chest pain - [facility] \par \par Began Nerlynx in January of 2020 .  + diarrhea, intermittent toe infections.  She had several breaks in Nerlyx treatments because of infection and respiratory illness.  Tested negative for COVID. Total of 13 weeks of breaks from Nerlynx.  She believes due to complete late April 2021. currently on 4 pills a day \par \par There have been several post operative infections and complication requiring multiple reconstructive surgeries.  Chronic lymphedema of both arms, also has had several episodes of bilateral chest wall cellullitis.\par hx of uti on spressive nitrofuriton.  hx of high bp \par \par She is here to transfer care. \par \par Sees rheumatology. a lot of joint pains. \par \par PETCT negative for any mets or lesions jan 2021 \par \par saw Dr. Combs- did R axilla US due to palpable lymph node ( was not sure if this was r/t J&J shot)\par \par less gi symptoms now off nerlynx( may 2021)  \par \par mri breast mammo 1/22/22 [de-identified] : here for zoladex \par had covid ( april 15) \par we paxlovid \par still feeling very tired , cough  is better \par throat still scratchy \par headache continues \par started 5000 vit d \par stopped letrozole for a week no major change, now back on it \par saw rheumatolgy

## 2022-06-28 NOTE — REVIEW OF SYSTEMS
[Fatigue] : fatigue [Joint Pain] : joint pain [Negative] : Allergic/Immunologic [Fever] : no fever [FreeTextEntry7] : diarrhea

## 2022-07-24 ENCOUNTER — RESULT REVIEW (OUTPATIENT)
Age: 47
End: 2022-07-24

## 2022-07-26 ENCOUNTER — RESULT REVIEW (OUTPATIENT)
Age: 47
End: 2022-07-26

## 2022-07-26 ENCOUNTER — APPOINTMENT (OUTPATIENT)
Dept: HEMATOLOGY ONCOLOGY | Facility: CLINIC | Age: 47
End: 2022-07-26

## 2022-07-26 VITALS
SYSTOLIC BLOOD PRESSURE: 128 MMHG | TEMPERATURE: 99.3 F | BODY MASS INDEX: 36.32 KG/M2 | DIASTOLIC BLOOD PRESSURE: 86 MMHG | RESPIRATION RATE: 18 BRPM | OXYGEN SATURATION: 98 % | WEIGHT: 205 LBS | HEART RATE: 102 BPM | HEIGHT: 63 IN

## 2022-07-26 DIAGNOSIS — G62.9 POLYNEUROPATHY, UNSPECIFIED: ICD-10-CM

## 2022-07-26 DIAGNOSIS — I89.0 LYMPHEDEMA, NOT ELSEWHERE CLASSIFIED: ICD-10-CM

## 2022-07-26 PROCEDURE — 99214 OFFICE O/P EST MOD 30 MIN: CPT | Mod: 25

## 2022-07-26 PROCEDURE — 36415 COLL VENOUS BLD VENIPUNCTURE: CPT

## 2022-07-26 NOTE — ASSESSMENT
[FreeTextEntry1] :  THis is a 46 y/o woman with a hx of her positive er positive a pT1cN1 stage 2a diagnosed October 2017 of left breast s/p neoadjuvant TCHP followed by bilateral mastectomy with node dissection , negative for residual disease , pCR. \par S/p 12 additional doses of Herceptin and perjeta. THen  radiation. Then started on nerlynx in jan 2020 . \par SHe also started on zoladex and exemestane in 2019.  Now on femara July 2021 .\par Completed nerlynx May 2021 \par \par # breast cancer \par breast s/p neoadjuvant TCHP followed by bilateral mastectomy with node dissection , negative for residual disease , pCR. \par S/p 12 additional doses of Herceptin and perjeta\par doing well overalll , DUYEN \par PET/CT negative for any evidence of metastases, jan 2021\par tolerating  zoladex well \par s/p follow-up with Dr. Combs oct 2021 , s/p bx in nov negative - fat necrosis \par has mammo and mri jan 2022 \par completed nerlynx ( 1 year ) ( may 2021) \par back on letrozole , tolerating well , cont letrozole - started AI 2019 with vit D\par Proceed with Zoladex 3.6 mg monthly. \par C/o weight gain \par DEXA 3/22 - reviewed. normal\par \par # back pain \par worsening.\par xray - no suspicious lytic or blastic lesions\par ALKP wnl\par \par # lymphedema \par cont PT \par \par # obesity \par advise weight loss and exercise \par endo evaluation for Wegowy\par \par # osteopenia \par Continue vitamin D and exercise\par dexa shows osteopenia but it is worse , consider prolia or zometa once more stable \par \par # vit d def - 33, 5 k daily \par cont vit d , check level \par \par # joint pains \par could be due to ai or zoladex or other etiology ( autoimmune) \par no improvement with holding femara \par cont femara \par follow up rheumatology  encouraged \par \par # fatigue \par \par RTC in 1 month with zoladex - CBC with diff, cmp, vit D

## 2022-07-26 NOTE — HISTORY OF PRESENT ILLNESS
[de-identified] : Ms Jonas is a 45 year old woman who was diagnosed with a pT1cN1 stage 2a , ER + [place]+ HER 2 nicole positive breast cancer in October 2017, after self palpating a lump in the left. She had breast imaging at [facility] in [place]. [place] [facility] [place] [facility] [place] \par She had neoadjuvant chemotherapy with Dr Pacheco, 11/2017 [facility]CHP had pCR  followed by a year completion of HP.  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 On b12 shots .  Bilateral mastectomies with reconstruction with Askikari at [facility] which revealed no evidence of residual disease. She underwent post-operative radiation therapy with Dr Purdy at [facility].  She underwent menopause during chemotherapy, and then menses resumed 2019.  She then started ovarian suppression with Zoladex in March of 2019. She  then began exemestane in 2019.  \par Hx of chest pain - [facility] \par \par Began Nerlynx in January of 2020 .  + diarrhea, intermittent toe infections.  She had several breaks in Nerlyx treatments because of infection and respiratory illness.  Tested negative for COVID. Total of 13 weeks of breaks from Nerlynx.  She believes due to complete late April 2021. currently on 4 pills a day \par \par There have been several post operative infections and complication requiring multiple reconstructive surgeries.  Chronic lymphedema of both arms, also has had several episodes of bilateral chest wall cellullitis.\par hx of uti on spressive nitrofuriton.  hx of high bp \par \par She is here to transfer care. \par \par Sees rheumatology. a lot of joint pains. \par \par PETCT negative for any mets or lesions jan 2021 \par \par saw Dr. Combs- did R axilla US due to palpable lymph node ( was not sure if this was r/t J&J shot)\par \par less gi symptoms now off nerlynx( may 2021)  \par \par mri breast mammo 1/22/22 [de-identified] : Patient seen and examined and here today for follow up\par R ear discomfort and difficulty hearing \par Here for zoladex \par On Letrozole. Complains of joint pains. Stable\par continues on vit D

## 2022-07-28 LAB
25(OH)D3 SERPL-MCNC: 44.4 NG/ML
APPEARANCE: ABNORMAL
BACTERIA UR CULT: NORMAL
BACTERIA: NEGATIVE
BILIRUBIN URINE: NEGATIVE
BLOOD URINE: NEGATIVE
CALCIUM OXALATE CRYSTALS: ABNORMAL
COLOR: YELLOW
GLUCOSE QUALITATIVE U: NEGATIVE
HYALINE CASTS: 0 /LPF
KETONES URINE: NORMAL
LEUKOCYTE ESTERASE URINE: NEGATIVE
MICROSCOPIC-UA: NORMAL
NITRITE URINE: NEGATIVE
PH URINE: 5.5
PROTEIN URINE: NORMAL
RED BLOOD CELLS URINE: 0 /HPF
SPECIFIC GRAVITY URINE: 1.03
SQUAMOUS EPITHELIAL CELLS: 5 /HPF
UROBILINOGEN URINE: ABNORMAL
WHITE BLOOD CELLS URINE: 2 /HPF

## 2022-08-22 NOTE — HISTORY OF PRESENT ILLNESS
[de-identified] : Ms Jonas is a 45 year old woman who was diagnosed with a pT1cN1 stage 2a , ER + [place]+ HER 2 nicole positive breast cancer in October 2017, after self palpating a lump in the left. She had breast imaging at [facility] in [place]. [place] [facility] [place] [facility] [place] \par She had neoadjuvant chemotherapy with Dr Pacheco, 11/2017 [facility]P had pCR  followed by a year completion of HP.  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 On b12 shots .  Bilateral mastectomies with reconstruction with Askikari at [facility] which revealed no evidence of residual disease. She underwent post-operative radiation therapy with Dr Purdy at [facility].  She underwent menopause during chemotherapy, and then menses resumed 2019.  She then started ovarian suppression with Zoladex in March of 2019. She  then began exemestane in 2019.  \par Hx of chest pain - [facility] \par \par Began Nerlynx in January of 2020 .  + diarrhea, intermittent toe infections.  She had several breaks in Nerlyx treatments because of infection and respiratory illness.  Tested negative for COVID. Total of 13 weeks of breaks from Nerlynx.  She believes due to complete late April 2021. currently on 4 pills a day \par \par There have been several post operative infections and complication requiring multiple reconstructive surgeries.  Chronic lymphedema of both arms, also has had several episodes of bilateral chest wall cellullitis.\par hx of uti on spressive nitrofuriton.  hx of high bp \par \par She is here to transfer care. \par \par Sees rheumatology. a lot of joint pains. \par \par PETCT negative for any mets or lesions jan 2021 \par \par saw Dr. Combs- did R axilla US due to palpable lymph node ( was not sure if this was r/t J&J shot)\par \par less gi symptoms now off nerlynx( may 2021)  \par \par mri breast mammo 1/22/22 [de-identified] : Patient seen and examined and here today for follow up\par R ear discomfort and difficulty hearing \par Here for zoladex \par On Letrozole. Complains of joint pains. Stable\par continues on vit D

## 2022-08-23 ENCOUNTER — APPOINTMENT (OUTPATIENT)
Dept: HEMATOLOGY ONCOLOGY | Facility: CLINIC | Age: 47
End: 2022-08-23

## 2022-08-25 ENCOUNTER — NON-APPOINTMENT (OUTPATIENT)
Age: 47
End: 2022-08-25

## 2022-08-30 ENCOUNTER — APPOINTMENT (OUTPATIENT)
Dept: HEMATOLOGY ONCOLOGY | Facility: CLINIC | Age: 47
End: 2022-08-30

## 2022-08-30 ENCOUNTER — RESULT REVIEW (OUTPATIENT)
Age: 47
End: 2022-08-30

## 2022-08-30 VITALS
RESPIRATION RATE: 18 BRPM | BODY MASS INDEX: 36.68 KG/M2 | WEIGHT: 207 LBS | OXYGEN SATURATION: 97 % | HEART RATE: 83 BPM | TEMPERATURE: 98.9 F | HEIGHT: 63 IN | DIASTOLIC BLOOD PRESSURE: 98 MMHG | SYSTOLIC BLOOD PRESSURE: 147 MMHG

## 2022-08-30 DIAGNOSIS — B33.8 OTHER SPECIFIED VIRAL DISEASES: ICD-10-CM

## 2022-08-30 PROCEDURE — 99214 OFFICE O/P EST MOD 30 MIN: CPT

## 2022-08-30 NOTE — REVIEW OF SYSTEMS
[Fatigue] : fatigue [Joint Pain] : joint pain [Negative] : Allergic/Immunologic [Cough] : cough [Abdominal Pain] : abdominal pain [Diarrhea] : diarrhea [Joint Stiffness] : joint stiffness [Anxiety] : anxiety [Fever] : no fever [Shortness Of Breath] : no shortness of breath [Wheezing] : no wheezing [SOB on Exertion] : no shortness of breath during exertion [FreeTextEntry7] : diarrhea stable. had diaphragmatic pain due to coughing spells.

## 2022-08-30 NOTE — PHYSICAL EXAM
[Fully active, able to carry on all pre-disease performance without restriction] : Status 0 - Fully active, able to carry on all pre-disease performance without restriction [Normal] : affect appropriate [de-identified] : slightly diminished b/l lung bases. no rhonchi, rales, crackles, nor wheezing.

## 2022-08-30 NOTE — ASSESSMENT
[FreeTextEntry1] :  THis is a 47 y/o woman with a hx of her positive er positive a pT1cN1 stage 2a diagnosed October 2017 of left breast s/p neoadjuvant TCHP followed by bilateral mastectomy with node dissection , negative for residual disease , pCR. \par S/p 12 additional doses of Herceptin and perjeta. THen  radiation. Then started on nerlynx in jan 2020 . \par SHe also started on zoladex and exemestane in 2019.  Now on femara July 2021 .\par Completed nerlynx May 2021 \par \par # breast cancer \par breast s/p neoadjuvant TCHP followed by bilateral mastectomy with node dissection , negative for residual disease , pCR. \par S/p 12 additional doses of Herceptin and perjeta\par doing well overalll , DUYEN \par PET/CT negative for any evidence of metastases, jan 2021\par tolerating  zoladex well \par s/p follow-up with Dr. Combs oct 2021 , s/p bx in nov negative - fat necrosis \par has mammo and mri jan 2022 \par completed nerlynx ( 1 year ) ( may 2021) \par back on letrozole , tolerating well , cont letrozole - started AI 2019 with vit D\par Proceed with Zoladex 3.6 mg monthly. \par C/o weight gain \par DEXA 3/22 - reviewed. normal\par \par # back pain \par worsening.\par xray - no suspicious lytic or blastic lesions\par ALKP wnl\par \par # lymphedema \par cont PT \par \par # obesity \par advise weight loss and exercise \par endo evaluation for Wegowy\par \par # osteopenia \par Continue vitamin D and exercise\par dexa shows osteopenia but it is worse , consider prolia or zometa once more stable \par \par # vit d def - 33, 5 k daily \par cont vit d , check level \par \par # joint pains \par could be due to ai or zoladex or other etiology ( autoimmune) \par no improvement with holding femara \par cont femara \par follow up rheumatology  encouraged \par \par # fatigue \par likely r/t to lingering sxs of recent RSV. \par \par RTC in 1 month with zoladex -

## 2022-08-30 NOTE — HISTORY OF PRESENT ILLNESS
[de-identified] : Ms Jonas is a 46 year old woman who was diagnosed with a pT1cN1 stage 2a , ER + [place]+ HER 2 nicole positive breast cancer in October 2017, after self palpating a lump in the left. She had breast imaging at [facility] in [place]. Left breast biopsy was done at [facility] [place] [facility] [place]par \par She had neoadjuvant chemotherapy with Dr Pacheco, 11/2017 TCHP had pCR  followed by a year completion of HP.  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 On b12 shots .  Bilateral mastectomies with reconstruction with Askikari at [facility] which revealed no evidence of residual disease. She underwent post-operative radiation therapy with Dr Purdy at [facility].  She underwent menopause during chemotherapy, and then menses resumed 2019.  She then started ovarian suppression with Zoladex in March of 2019. She  then began exemestane in 2019.  \par Hx of chest pain - [facility] \par \par Began Nerlynx in January of 2020 .  + diarrhea, intermittent toe infections.  She had several breaks in Nerlyx treatments because of infection and respiratory illness.  Tested negative for COVID. Total of 13 weeks of breaks from Nerlynx.  She believes due to complete late April 2021. currently on 4 pills a day \par \par There have been several post operative infections and complication requiring multiple reconstructive surgeries.  Chronic lymphedema of both arms, also has had several episodes of bilateral chest wall cellullitis.\par hx of uti on spressive nitrofuriton.  hx of high bp \par \par She is here to transfer care. \par \par Sees rheumatology. a lot of joint pains. \par \par PETCT negative for any mets or lesions jan 2021 \par \par saw Dr. Combs- did R axilla US due to palpable lymph node ( was not sure if this was r/t J&J shot)\par \par less gi symptoms now off nerlynx( may 2021)  \par \par mri breast mammo 1/22/22 [de-identified] : called office last thursday- found out she got RSV . had difficulty reaching clinical staff at [facility] office. \par called Dr. Philippe's office at [place]- spoke to PAPA Gallo who recommended Z pack, prednisone and inhaler PRN . \par steroids started thurs- completed 3 days. \par z pack started wed- completed tx. \par still has coughing episodes- not as bad at before. pro air pump not effective.

## 2022-09-27 ENCOUNTER — RESULT REVIEW (OUTPATIENT)
Age: 47
End: 2022-09-27

## 2022-09-27 ENCOUNTER — APPOINTMENT (OUTPATIENT)
Dept: HEMATOLOGY ONCOLOGY | Facility: CLINIC | Age: 47
End: 2022-09-27

## 2022-09-27 VITALS
WEIGHT: 209 LBS | SYSTOLIC BLOOD PRESSURE: 170 MMHG | BODY MASS INDEX: 37.03 KG/M2 | RESPIRATION RATE: 18 BRPM | DIASTOLIC BLOOD PRESSURE: 107 MMHG | TEMPERATURE: 99 F | OXYGEN SATURATION: 99 % | HEART RATE: 90 BPM | HEIGHT: 63 IN

## 2022-09-27 PROCEDURE — 99214 OFFICE O/P EST MOD 30 MIN: CPT

## 2022-09-28 LAB
25(OH)D3 SERPL-MCNC: 44.3 NG/ML
CANCER AG15-3 SERPL-ACNC: 10.1 U/ML
CEA SERPL-MCNC: 1.1 NG/ML
FERRITIN SERPL-MCNC: 63 NG/ML
FOLATE SERPL-MCNC: 14.8 NG/ML
IRON SATN MFR SERPL: 45 %
IRON SERPL-MCNC: 130 UG/DL
TIBC SERPL-MCNC: 285 UG/DL
UIBC SERPL-MCNC: 155 UG/DL
VIT B12 SERPL-MCNC: 915 PG/ML

## 2022-09-29 NOTE — REVIEW OF SYSTEMS
[Fatigue] : fatigue [Cough] : cough [Abdominal Pain] : abdominal pain [Diarrhea] : diarrhea [Joint Pain] : joint pain [Joint Stiffness] : joint stiffness [Anxiety] : anxiety [Negative] : Allergic/Immunologic [Fever] : no fever [Shortness Of Breath] : no shortness of breath [Wheezing] : no wheezing [SOB on Exertion] : no shortness of breath during exertion [FreeTextEntry2] : fatigue worse after recovering from virus [FreeTextEntry7] : diarrhea stable.  [FreeTextEntry9] : lower back persistent, but not worse

## 2022-09-29 NOTE — PHYSICAL EXAM
[Fully active, able to carry on all pre-disease performance without restriction] : Status 0 - Fully active, able to carry on all pre-disease performance without restriction [Normal] : affect appropriate [de-identified] : slightly diminished b/l lung bases. no rhonchi, rales, crackles, nor wheezing.  [de-identified] : bounding regular HR [de-identified] : not examined today

## 2022-09-29 NOTE — ASSESSMENT
[FreeTextEntry1] :  THis is a 45 y/o woman with a hx of her positive er positive a pT1cN1 stage 2a diagnosed October 2017 of left breast s/p neoadjuvant TCHP followed by bilateral mastectomy with node dissection , negative for residual disease , pCR. \par S/p 12 additional doses of Herceptin and perjeta. THen  radiation. Then started on nerlynx in jan 2020 . \par SHe also started on zoladex and exemestane in 2019.  Now on femara July 2021 .\par Completed nerlynx May 2021 \par \par # breast cancer \par breast s/p neoadjuvant TCHP followed by bilateral mastectomy with node dissection , negative for residual disease , pCR. \par S/p 12 additional doses of Herceptin and perjeta\par doing well overalll , DUYEN \par PET/CT negative for any evidence of metastases, jan 2021\par tolerating  zoladex well \par s/p follow-up with Dr. Combs oct 2021 , s/p bx in nov negative - fat necrosis \par has mammo and mri jan 2022 \par completed nerlynx ( 1 year ) ( may 2021) \par back on letrozole , tolerating well , cont letrozole - started AI 2019 with vit D\par Proceed with Zoladex 3.6 mg monthly. \par C/o weight gain \par DEXA 3/22 - reviewed. normal\par \par # back pain \par persistent\par xray - no suspicious lytic or blastic lesions\par ALKP wnl\par \par # lymphedema \par advised PT/OT\par \par # obesity \par likely r/t to zoladex\par re-advised weight loss and exercise \par endo evaluation for Wegowy\par \par # osteopenia \par Continue vitamin D and exercise\par dexa shows osteopenia but it is worse , consider prolia or zometa once more stable \par \par # vit d def - 33, 5 k daily \par cont vit d , check level \par \par # joint pains \par could be due to ai or zoladex or other etiology ( autoimmune) \par no improvement with holding femara , pt wants to continue\par cont femara \par follow up rheumatology  encouraged \par \par # fatigue \par likely r/t to lingering sxs of recent viral illness\par hydration 1L today\par check fatigue work up \par \par RTC in 1 month with zoladex and CBC, CMP w/ diff

## 2022-09-29 NOTE — HISTORY OF PRESENT ILLNESS
[de-identified] : Ms Jonas is a 47 year old woman who was diagnosed with a pT1cN1 stage 2a , ER + UT+ HER 2 nicole positive breast cancer in October 2017, after self palpating a lump in the left. She had breast imaging at [facility] in [place]. Left breast biopsy was done at [facility] [place] [facility] [place] \par She had neoadjuvant chemotherapy with Dr Pacheco, 11/2017 TCHP had pCR  followed by a year completion of HP.  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 On b12 shots .  Bilateral mastectomies with reconstruction with Askikari at [facility] which revealed no evidence of residual disease. She underwent post-operative radiation therapy with Dr Purdy at [facility].  She underwent menopause during chemotherapy, and then menses resumed 2019.  She then started ovarian suppression with Zoladex in March of 2019. She  then began exemestane in 2019.  \par Hx of chest pain - [facility] \par \par Began Nerlynx in January of 2020 .  + diarrhea, intermittent toe infections.  She had several breaks in Nerlyx treatments because of infection and respiratory illness.  Tested negative for COVID. Total of 13 weeks of breaks from Nerlynx.  She believes due to complete late April 2021. currently on 4 pills a day \par \par There have been several post operative infections and complication requiring multiple reconstructive surgeries.  Chronic lymphedema of both arms, also has had several episodes of bilateral chest wall cellullitis.\par hx of uti on spressive nitrofuriton.  hx of high bp \par \par She is here to transfer care. \par \par Sees rheumatology. a lot of joint pains. \par \par PETCT negative for any mets or lesions jan 2021 \par \par saw Dr. Combs- did R axilla US due to palpable lymph node ( was not sure if this was r/t J&J shot)\par \par less gi symptoms now off nerlynx( may 2021)  \par \par mri breast mammo 1/22/22 [de-identified] : \par went to ER on 09/08/22- CT chest 09/08/22 negative for infection \par due for monthly zoladex today \par had high BP in ER. admited over night for observation. saw PCP losartan 25 mg. started 09/15/22. \par PT/OT- was not able to do\par lumbar spine xray on 07/25/22 for back pain negative. \par not drinking enough water

## 2022-10-13 NOTE — ASSESSMENT
[FreeTextEntry1] : The patient is a 47-year-old  still premenopausal white female of Thai, English, Sweedish, Angolan, Darlin, Slovenian, and  descent.  She underwent menarche at age 12 and had her first child at age 32.  She has a strong family history of breast and ovarian cancer with her maternal great aunt who had ovarian cancer who passed away.  She has a maternal second cousin who had breast cancer in her late 40s.  Her paternal grandfather passed away from colorectal cancer at age 65.  Her maternal grandfather had colon cancer around age 75.  She has 4 maternal great uncles who had esophageal and throat cancer and a paternal great aunt who had uterine/cervical cancer.  A paternal great uncle had lung cancer.  The patient underwent bilateral augmentation implants in  requiring multiple replacements for ruptures last exchanged in .  She noticed a density towards the lateral aspect of the left breast and mammography showed suspicious left retroareolar calcifications.  Ultrasound showed multiple suspicious areas in the left breast 2:00, 3:00, as well as a suspicious left axillary lymph node.  Ultrasound-guided core biopsies on 2017 showed the left retroareolar density to be an intermediate grade invasive duct cancer with surrounding DCIS which was ER positive at 80%, GA negative, and HER-2 3+ by IHC.  A separate left breast 2:00 density 5 cm from the nipple turned out to be metastatic cancer in an intramammary lymph node which was ER/GA positive and HER-2 positive.  The left axillary suspicious lymph node was also positive.  She had clips placed on all the biopsied lesions as well as in a separate left breast 2:00 and 9:00 density which turned out to be a fibroadenoma and fibrocystic change.  An MRI performed on 2017 showed the right breast to be negative but the left breast showed extensive enhancement and PET scan showed no distant disease.  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  She underwent neoadjuvant TCHP chemotherapy through Dr. Pacheco.  MRI after neoadjuvant chemotherapy on 2018 showed no further enhancement.  I then performed bilateral total mastectomies through a Wise reduction pattern incision on April 10, 2018 and she had bilateral expander reconstructions with AlloDerm.  She had a sentinel lymph node biopsy with localization of the nodes preoperatively and both the intramammary as well as axillary lymph nodes were all negative.  She ended up with another 12 nodes removed which were negative since the localization was not perfectly placed.  The left mastectomy showed no residual cancer and the right mastectomy was negative.  She did undergo a small wound revision of the left breast by Dr. Nava on May 23, 2018.  She is following up with Dr. Pacheco and finished Herceptin and Perjeta and underwent postmastectomy radiation therapy at [facility] which finished on 2018.  She developed some redness in the left breast and was admitted to [facility] and placed on IV vancomycin for 3 days and the redness resolved.  She developed bilateral upper extremity lymphedema for which she wears compression sleeves.  She developed redness over the left implant and was placed on Levaquin in 2018 and the redness improved.  She again was placed on Cipro in 2019 for some cellulitis over the right implant.  She underwent fat grafting and removal of her port in 2019 and then had her implants removed and exchanged by Dr. Ye in 2019 for cosmesis.  She now follows up with  ???????? Holly Walker and is continuing on Zoladex as well as Aromasin and received neratinib which finished in May 2021.  She did have some neuropathy and follows up with Dr. Stephen.  On exam, I cannot feel any suspicious densities over either implant.  She has stable bilateral arm lymphedema.  There was a small density felt over the upper inner aspect of the left implant and I sent her for directed left breast ultrasound which was performed on 3/22/2021 showing no suspicious findings in that area.  Her redness has since improved and the density is no longer palpable.  She did have this soft palpable lymph node in the upper right axillary region but directed axillary ultrasound on May 7, 2021 only showed normal axillary lymph nodes.  She did have her 2nd COVID vaccination in 2021.  The patient then underwent a bilateral breast MRI ordered by medical oncology on 2021 which showed some enhancement in the upper inner aspect of the right breast and she was sent for targeted mammography and ultrasound of the right breast on 2021 just showing some developing fat necrosis likely from her fat injections in the past.  A follow-up diagnostic MRI and right breast mammography was performed on 2022 and the mammography just showed a "coil" clip in the right breast around a previously biopsied benign density and the MRI showed no suspicious findings.  She also had a follow-up diagnostic right breast ultrasound on 2022 performed here [facility] just showing this right breast 2:00 previously biopsied area of fat necrosis to have decreased in size.  She was reassured and can just follow-up again in 6 months in 2023.   Otherwise, she should remain on Zoladex and Aromasin, through  ?????? Den[facility].  I will get 1 more bilateral breast MRI around 2023 and then a prescription and if that is unchanged and negative, I think we can stop with routine diagnostic imaging.

## 2022-10-13 NOTE — PHYSICAL EXAM
[Normocephalic] : normocephalic [Atraumatic] : atraumatic [EOMI] : extra ocular movement intact [Supple] : supple [No Supraclavicular Adenopathy] : no supraclavicular adenopathy [No Cervical Adenopathy] : no cervical adenopathy [Examined in the supine and seated position] : examined in the supine and seated position [No dominant masses] : no dominant masses in right breast  [No dominant masses] : no dominant masses left breast [Breast Mass Right Breast ___cm] : no masses [Breast Mass Left Breast ___cm] : no masses [___cm] : ~M [unfilled] ~Ucm upper inner quadrant mass [No Axillary Lymphadenopathy] : no left axillary lymphadenopathy [No Rashes] : no rashes [No Ulceration] : no ulceration [de-identified] : On exam, the patient has obvious bilateral mastectomies with implant reconstructions with a Nielsen pattern incision.  I cannot feel any suspicious densities over either implant.  She does have a slightly enlarged lymph node felt higher up in the right axilla but this is fairly soft and slightly tender.  She did get her COVID vaccination second dose in March 2021.  She has no supraclavicular, or cervical adenopathy.  Her left skin redness has improved and the previous medial density is no longer palpable. [de-identified] : Status post mastectomy and implant reconstruction with Wise pattern approach [de-identified] : Status post mastectomy and implant reconstruction with Wise pattern approach.  Improved redness over upper aspect of the left implant with resolution of density [de-identified] : Soft slightly enlarged right axillary lymph node [de-identified] : Stable upper extremity mild lymphedema

## 2022-10-13 NOTE — REASON FOR VISIT
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  The cancer was ER positive, NE negative, and HER-2 3+ and she underwent neoadjuvant TCHP chemotherapy through Dr. Pacheco.  She had bilateral total mastectomies through a Wise reduction pattern on April 10, 2018 and had bilateral expander reconstructions with AlloDerm.  Her nodes were all negative after neoadjuvant chemotherapy.  She finished Herceptin and Perjeta and had postmastectomy radiation and did develop some bouts of cellulitis and bilateral arm lymphedema.  She had her expanders exchanged for implants in 2019 and is currently on Zoladex, Aromasin, and is on neratinib and comes in for an interval follow-up with a questionable palpable right axillary lymph node felt by her medical oncologist.

## 2022-10-13 NOTE — HISTORY OF PRESENT ILLNESS
[FreeTextEntry1] : The patient is a 47-year-old  still premenopausal white female of Maori, English, Sweedish, Nauruan, Darlin, Yi, and  descent.  She underwent menarche at age 12 and had her first child at age 32.  She has a strong family history of breast and ovarian cancer with her maternal great aunt who had ovarian cancer who passed away.  She has a maternal second cousin who had breast cancer in her late 40s.  Her paternal grandfather passed away from colorectal cancer at age 65.  Her maternal grandfather had colon cancer around age 75.  She has 4 maternal great uncles who had esophageal and throat cancer and a paternal great aunt who had uterine/cervical cancer.  A paternal great uncle had lung cancer.  The patient underwent bilateral augmentation implants in  requiring multiple replacements for ruptures last exchanged in .  She noticed a density towards the lateral aspect of the left breast and mammography showed suspicious left retroareolar calcifications.  Ultrasound showed multiple suspicious areas in the left breast 2:00, 3:00, as well as a suspicious left axillary lymph node.  Ultrasound-guided core biopsies on 2017 showed the left retroareolar density to be an intermediate grade invasive duct cancer with surrounding DCIS which was ER positive at 80%, MA negative, and HER-2 3+ by IHC.  A separate left breast 2:00 density 5 cm from the nipple turned out to be metastatic cancer in an intramammary lymph node which was ER/MA positive and HER-2 positive.  The left axillary suspicious lymph node was also positive.  She had clips placed on all the biopsied lesions as well as in a separate left breast 2:00 and 9:00 density which turned out to be a fibroadenoma and fibrocystic change.  An MRI performed on 2017 showed the right breast to be negative but the left breast showed extensive enhancement and PET scan showed no distant disease.  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  She underwent neoadjuvant TCHP chemotherapy through Dr. Pacheco.  MRI after neoadjuvant chemotherapy on 2018 showed no further enhancement.  I then performed bilateral total mastectomies through a Wise reduction pattern incision on April 10, 2018 and she had bilateral expander reconstructions with AlloDerm.  She had a sentinel lymph node biopsy with localization of the nodes preoperatively and both the intramammary as well as axillary lymph nodes were all negative.  She ended up with another 12 nodes removed which were negative since the localization was not perfectly placed.  The left mastectomy showed no residual cancer and the right mastectomy was negative.  She did undergo a small wound revision of the left breast by Dr. Naav on May 23, 2018.  She is following up with Dr. Pacheco and finished Herceptin and Perjeta and underwent postmastectomy radiation therapy at [facility] which finished on 2018.  She developed some redness in the left breast and was admitted to [place] and placed on IV vancomycin for 3 days and the redness resolved.  She developed bilateral upper extremity lymphedema for which she wears compression sleeves.  She developed redness over the left implant and was placed on Levaquin in 2018 and the redness improved.  She again was placed on Cipro in 2019 for some cellulitis over the right implant.  She underwent fat grafting and removal of her port in 2019 and then had her implants removed and exchanged by Dr. Ye in 2019 for cosmesis.  She now follows up with Dr. Holly Walker and is continuing on Zoladex as well as Aromasin and finished neratinib in May 2021.  She did have some neuropathy and follows up with Dr. Stephen.  She had a questionable lymph node felt in her lower right axilla in May 2021 but directed ultrasound just showed normal lymph nodes.  MRI on 2021 showed some enhancement in the upper inner aspect of the right breast but mammography and ultrasound on 2021 and follow-up MRI in 2022 just showed some areas of fat necrosis.  She comes in now for routine follow-up.

## 2022-10-20 ENCOUNTER — APPOINTMENT (OUTPATIENT)
Dept: BREAST CENTER | Facility: CLINIC | Age: 47
End: 2022-10-20

## 2022-10-24 ENCOUNTER — APPOINTMENT (OUTPATIENT)
Dept: BREAST CENTER | Facility: CLINIC | Age: 47
End: 2022-10-24

## 2022-10-24 VITALS
HEART RATE: 99 BPM | BODY MASS INDEX: 37.02 KG/M2 | OXYGEN SATURATION: 98 % | SYSTOLIC BLOOD PRESSURE: 129 MMHG | WEIGHT: 209 LBS | DIASTOLIC BLOOD PRESSURE: 85 MMHG

## 2022-10-24 PROCEDURE — 99213 OFFICE O/P EST LOW 20 MIN: CPT

## 2022-10-24 NOTE — REASON FOR VISIT
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  The cancer was ER positive, ME negative, and HER-2 3+ and she underwent neoadjuvant TCHP chemotherapy through Dr. Pacheco.  She had bilateral total mastectomies through a Wise reduction pattern on April 10, 2018 and had bilateral expander reconstructions with AlloDerm.  Her nodes were all negative after neoadjuvant chemotherapy.  She finished Herceptin and Perjeta and had postmastectomy radiation and did develop some bouts of cellulitis and bilateral arm lymphedema.  She had her expanders exchanged for implants in 2019 and is currently on Zoladex, Aromasin, and is on neratinib and comes in for an interval follow-up with a questionable palpable right axillary lymph node felt by her medical oncologist.

## 2022-10-24 NOTE — HISTORY OF PRESENT ILLNESS
[FreeTextEntry1] : The patient is a 47-year-old  still premenopausal white female of Slovak, English, Sweedish, Belizean, Darlin, Ukrainian, and  descent.  She underwent menarche at age 12 and had her first child at age 32.  She has a strong family history of breast and ovarian cancer with her maternal great aunt who had ovarian cancer who passed away.  She has a maternal second cousin who had breast cancer in her late 40s.  Her paternal grandfather passed away from colorectal cancer at age 65.  Her maternal grandfather had colon cancer around age 75.  She has 4 maternal great uncles who had esophageal and throat cancer and a paternal great aunt who had uterine/cervical cancer.  A paternal great uncle had lung cancer.  The patient underwent bilateral augmentation implants in  requiring multiple replacements for ruptures last exchanged in .  She noticed a density towards the lateral aspect of the left breast and mammography showed suspicious left retroareolar calcifications.  Ultrasound showed multiple suspicious areas in the left breast 2:00, 3:00, as well as a suspicious left axillary lymph node.  Ultrasound-guided core biopsies on 2017 showed the left retroareolar density to be an intermediate grade invasive duct cancer with surrounding DCIS which was ER positive at 80%, MI negative, and HER-2 3+ by IHC.  A separate left breast 2:00 density 5 cm from the nipple turned out to be metastatic cancer in an intramammary lymph node which was ER/MI positive and HER-2 positive.  The left axillary suspicious lymph node was also positive.  She had clips placed on all the biopsied lesions as well as in a separate left breast 2:00 and 9:00 density which turned out to be a fibroadenoma and fibrocystic change.  An MRI performed on 2017 showed the right breast to be negative but the left breast showed extensive enhancement and PET scan showed no distant disease.  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  She underwent neoadjuvant TCHP chemotherapy through Dr. Pacheco.  MRI after neoadjuvant chemotherapy on 2018 showed no further enhancement.  I then performed bilateral total mastectomies through a Wise reduction pattern incision on April 10, 2018 and she had bilateral expander reconstructions with AlloDerm.  She had a sentinel lymph node biopsy with localization of the nodes preoperatively and both the intramammary as well as axillary lymph nodes were all negative.  She ended up with another 12 nodes removed which were negative since the localization was not perfectly placed.  The left mastectomy showed no residual cancer and the right mastectomy was negative.  She did undergo a small wound revision of the left breast by Dr. Nava on May 23, 2018.  She is following up with Dr. Pacheco and finished Herceptin and Perjeta and underwent postmastectomy radiation therapy at [facility] which finished on 2018.  She developed some redness in the left breast and was admitted to [place] and placed on IV vancomycin for 3 days and the redness resolved.  She developed bilateral upper extremity lymphedema for which she wears compression sleeves.  She developed redness over the left implant and was placed on Levaquin in 2018 and the redness improved.  She again was placed on Cipro in 2019 for some cellulitis over the right implant.  She underwent fat grafting and removal of her port in 2019 and then had her implants removed and exchanged by Dr. Ye in 2019 for cosmesis.  She now follows up with Dr. Holly Walker and is continuing on Zoladex as well as Aromasin and finished neratinib in May 2021.  She did have some neuropathy and follows up with Dr. Stephen.  She had a questionable lymph node felt in her lower right axilla in May 2021 but directed ultrasound just showed normal lymph nodes.  MRI on 2021 showed some enhancement in the upper inner aspect of the right breast but mammography and ultrasound on 2021 and follow-up MRI in 2022 just showed some areas of fat necrosis.  She comes in now for routine follow-up.

## 2022-10-24 NOTE — PHYSICAL EXAM
[Normocephalic] : normocephalic [Atraumatic] : atraumatic [EOMI] : extra ocular movement intact [Supple] : supple [No Supraclavicular Adenopathy] : no supraclavicular adenopathy [No Cervical Adenopathy] : no cervical adenopathy [Examined in the supine and seated position] : examined in the supine and seated position [No dominant masses] : no dominant masses in right breast  [No dominant masses] : no dominant masses left breast [Breast Mass Right Breast ___cm] : no masses [Breast Mass Left Breast ___cm] : no masses [___cm] : ~M [unfilled] ~Ucm upper inner quadrant mass [No Axillary Lymphadenopathy] : no left axillary lymphadenopathy [No Rashes] : no rashes [No Ulceration] : no ulceration [de-identified] : On exam, the patient has obvious bilateral mastectomies with implant reconstructions with a Nielsen pattern incision.  I cannot feel any suspicious densities over either implant.  She has no supraclavicular, or cervical adenopathy.  Her left skin redness has improved and the previous medial density is no longer palpable. [de-identified] : Status post mastectomy and implant reconstruction with Wise pattern approach [de-identified] : Status post mastectomy and implant reconstruction with Wise pattern approach.  Improved redness over upper aspect of the left implant with resolution of density [de-identified] : Soft slightly enlarged right axillary lymph node [de-identified] : Stable upper extremity mild lymphedema

## 2022-10-24 NOTE — ASSESSMENT
[FreeTextEntry1] : The patient is a 47-year-old  still premenopausal white female of Upper sorbian, English, Sweedish, Iraqi, Darlin, Romanian, and  descent.  She underwent menarche at age 12 and had her first child at age 32.  She has a strong family history of breast and ovarian cancer with her maternal great aunt who had ovarian cancer who passed away.  She has a maternal second cousin who had breast cancer in her late 40s.  Her paternal grandfather passed away from colorectal cancer at age 65.  Her maternal grandfather had colon cancer around age 75.  She has 4 maternal great uncles who had esophageal and throat cancer and a paternal great aunt who had uterine/cervical cancer.  A paternal great uncle had lung cancer.  The patient underwent bilateral augmentation implants in  requiring multiple replacements for ruptures last exchanged in .  She noticed a density towards the lateral aspect of the left breast and mammography showed suspicious left retroareolar calcifications.  Ultrasound showed multiple suspicious areas in the left breast 2:00, 3:00, as well as a suspicious left axillary lymph node.  Ultrasound-guided core biopsies on 2017 showed the left retroareolar density to be an intermediate grade invasive duct cancer with surrounding DCIS which was ER positive at 80%, ID negative, and HER-2 3+ by IHC.  A separate left breast 2:00 density 5 cm from the nipple turned out to be metastatic cancer in an intramammary lymph node which was ER/ID positive and HER-2 positive.  The left axillary suspicious lymph node was also positive.  She had clips placed on all the biopsied lesions as well as in a separate left breast 2:00 and 9:00 density which turned out to be a fibroadenoma and fibrocystic change.  An MRI performed on 2017 showed the right breast to be negative but the left breast showed extensive enhancement and PET scan showed no distant disease.  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  She underwent neoadjuvant TCHP chemotherapy through Dr. Pacheco.  MRI after neoadjuvant chemotherapy on 2018 showed no further enhancement.  I then performed bilateral total mastectomies through a Wise reduction pattern incision on April 10, 2018 and she had bilateral expander reconstructions with AlloDerm.  She had a sentinel lymph node biopsy with localization of the nodes preoperatively and both the intramammary as well as axillary lymph nodes were all negative.  She ended up with another 12 nodes removed which were negative since the localization was not perfectly placed.  The left mastectomy showed no residual cancer and the right mastectomy was negative.  She did undergo a small wound revision of the left breast by Dr. Nava on May 23, 2018.  She is following up with Dr. Pacheco and finished Herceptin and Perjeta and underwent postmastectomy radiation therapy at [facility] which finished on 2018.  She developed some redness in the left breast and was admitted to [place] and placed on IV vancomycin for 3 days and the redness resolved.  She developed bilateral upper extremity lymphedema for which she wears compression sleeves.  She developed redness over the left implant and was placed on Levaquin in 2018 and the redness improved.  She again was placed on Cipro in 2019 for some cellulitis over the right implant.  She underwent fat grafting and removal of her port in 2019 and then had her implants removed and exchanged by Dr. Ye in 2019 for cosmesis.  She now follows up with  ???????? Holly Walker and is continuing on Zoladex as well as Aromasin and received neratinib which finished in May 2021.  She did have some neuropathy and follows up with Dr. Stephen.  On exam, I cannot feel any suspicious densities over either implant.  She has stable bilateral arm lymphedema.  There was a small density felt over the upper inner aspect of the left implant and I sent her for directed left breast ultrasound which was performed on 3/22/2021 showing no suspicious findings in that area.  Her redness has since improved and the density is no longer palpable.  She did have this soft palpable lymph node in the upper right axillary region but directed axillary ultrasound on May 7, 2021 only showed normal axillary lymph nodes.  She did have her 2nd COVID vaccination in 2021.  The patient then underwent a bilateral breast MRI ordered by medical oncology on 2021 which showed some enhancement in the upper inner aspect of the right breast and she was sent for targeted mammography and ultrasound of the right breast on 2021 just showing some developing fat necrosis likely from her fat injections in the past.  A follow-up diagnostic MRI and right breast mammography was performed on 2022 and the mammography just showed a "coil" clip in the right breast around a previously biopsied benign density and the MRI showed no suspicious findings.  She also had a follow-up diagnostic right breast ultrasound on 2022 performed here [facility] just showing this right breast 2:00 previously biopsied area of fat necrosis to have decreased in size.  She was reassured and can just follow-up again in 6 months in 2023.   Otherwise, she should remain on Zoladex and Aromasin, through Dr. Philippe.  I will get 1 more bilateral breast MRI around 2023 and she was given a prescription and if that is unchanged and negative, I think we can stop with routine diagnostic imaging.  If she is doing well in 6 months, she can start following up yearly since she will be 5 years postop.

## 2022-10-25 ENCOUNTER — RESULT REVIEW (OUTPATIENT)
Age: 47
End: 2022-10-25

## 2022-10-25 ENCOUNTER — APPOINTMENT (OUTPATIENT)
Dept: HEMATOLOGY ONCOLOGY | Facility: CLINIC | Age: 47
End: 2022-10-25

## 2022-10-25 VITALS
DIASTOLIC BLOOD PRESSURE: 100 MMHG | TEMPERATURE: 99 F | BODY MASS INDEX: 35.61 KG/M2 | WEIGHT: 201 LBS | OXYGEN SATURATION: 98 % | SYSTOLIC BLOOD PRESSURE: 161 MMHG | RESPIRATION RATE: 18 BRPM | HEART RATE: 93 BPM | HEIGHT: 63 IN

## 2022-10-25 PROCEDURE — 99214 OFFICE O/P EST MOD 30 MIN: CPT | Mod: 25

## 2022-10-25 PROCEDURE — 36415 COLL VENOUS BLD VENIPUNCTURE: CPT

## 2022-10-25 RX ORDER — PREDNISONE 20 MG/1
20 TABLET ORAL
Qty: 6 | Refills: 0 | Status: DISCONTINUED | COMMUNITY
Start: 2022-08-24

## 2022-10-25 RX ORDER — AZITHROMYCIN 500 MG/1
500 TABLET, FILM COATED ORAL
Qty: 5 | Refills: 0 | Status: DISCONTINUED | COMMUNITY
Start: 2022-09-13

## 2022-10-25 RX ORDER — LOSARTAN POTASSIUM 50 MG/1
50 TABLET, FILM COATED ORAL
Qty: 30 | Refills: 0 | Status: DISCONTINUED | COMMUNITY
Start: 2022-09-28

## 2022-10-25 RX ORDER — LOSARTAN POTASSIUM 25 MG/1
25 TABLET, FILM COATED ORAL
Qty: 90 | Refills: 0 | Status: DISCONTINUED | COMMUNITY
Start: 2022-09-15

## 2022-10-25 RX ORDER — AZITHROMYCIN 250 MG/1
250 TABLET, FILM COATED ORAL
Qty: 6 | Refills: 0 | Status: DISCONTINUED | COMMUNITY
Start: 2022-08-24

## 2022-10-25 RX ORDER — COVID-19 ANTIGEN TEST
KIT MISCELLANEOUS
Qty: 8 | Refills: 0 | Status: DISCONTINUED | COMMUNITY
Start: 2022-08-16

## 2022-10-25 RX ORDER — NIRMATRELVIR AND RITONAVIR 300-100 MG
20 X 150 MG & KIT ORAL
Qty: 1 | Refills: 0 | Status: DISCONTINUED | COMMUNITY
Start: 2022-04-16 | End: 2022-10-25

## 2022-10-25 RX ORDER — LOSARTAN POTASSIUM 50 MG/1
50 TABLET, FILM COATED ORAL
Qty: 30 | Refills: 0 | Status: ACTIVE | COMMUNITY
Start: 2022-09-28

## 2022-10-25 NOTE — PHYSICAL EXAM
[Fully active, able to carry on all pre-disease performance without restriction] : Status 0 - Fully active, able to carry on all pre-disease performance without restriction [Normal] : affect appropriate [de-identified] : slightly diminished b/l lung bases. no rhonchi, rales, crackles, nor wheezing.

## 2022-10-25 NOTE — HISTORY OF PRESENT ILLNESS
[de-identified] : Ms Jonas is a 46 year old woman who was diagnosed with a pT1cN1 stage 2a , ER + [place]+ HER 2 nicole positive breast cancer in October 2017, after self palpating a lump in the left. She had breast imaging at [facility] in [place]. Left breast biopsy was done at [facility] in [facility].\par \par She had neoadjuvant chemotherapy with Dr Pacheco, 11/2017 TCHP had pCR  followed by a year completion of HP.  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 On b12 shots .  Bilateral mastectomies with reconstruction with Askikari at [facility] which revealed no evidence of residual disease. She underwent post-operative radiation therapy with Dr Purdy at [facility].  She underwent menopause during chemotherapy, and then menses resumed 2019.  She then started ovarian suppression with Zoladex in March of 2019. She  then began exemestane in 2019.  \par Hx of chest pain - [facility] \par \par Began Nerlynx in January of 2020 .  + diarrhea, intermittent toe infections.  She had several breaks in Nerlyx treatments because of infection and respiratory illness.  Tested negative for COVID. Total of 13 weeks of breaks from Nerlynx.  She believes due to complete late April 2021. currently on 4 pills a day \par \par There have been several post operative infections and complication requiring multiple reconstructive surgeries.  Chronic lymphedema of both arms, also has had several episodes of bilateral chest wall cellullitis.\par hx of uti on spressive nitrofuriton.  hx of high bp \par \par She is here to transfer care. \par \par Sees rheumatology. a lot of joint pains. \par \par PETCT negative for any mets or lesions jan 2021 \par \par saw Dr. Combs- did R axilla US due to palpable lymph node ( was not sure if this was r/t J&J shot)\par \par less gi symptoms now off nerlynx( may 2021)  \par \par mri breast mammo 1/22/22 [de-identified] : Patient seen and examined today for routine follow up. \par She has a history of RSV s/p Zpack, prednisone, inhaler. S/s have improved. \par She remains on letrozole with persistent joint pains. She followed with Rheum Dr. Elizabeth Orlando who noted it is likely due to medications\par back pain \par Had rx for medical marijuana has not used. She has a lot of stressors at home.\par diarrhea daily takes immodium.

## 2022-10-25 NOTE — REVIEW OF SYSTEMS
[Fatigue] : fatigue [Cough] : cough [Diarrhea] : diarrhea [Joint Pain] : joint pain [Joint Stiffness] : joint stiffness [Anxiety] : anxiety [Negative] : Allergic/Immunologic [Dysphagia] : dysphagia [Fever] : no fever [Shortness Of Breath] : no shortness of breath [Wheezing] : no wheezing [SOB on Exertion] : no shortness of breath during exertion [Abdominal Pain] : no abdominal pain [FreeTextEntry2] : +headaches worsening  [FreeTextEntry6] : s/p RSV  [FreeTextEntry7] : diarrhea stable [FreeTextEntry9] : back pain

## 2022-10-25 NOTE — ASSESSMENT
[FreeTextEntry1] :  THis is a 47 y/o woman with a hx of her positive er positive a pT1cN1 stage 2a diagnosed October 2017 of left breast s/p neoadjuvant TCHP followed by bilateral mastectomy with node dissection , negative for residual disease , pCR. \par S/p 12 additional doses of Herceptin and perjeta. THen  radiation. Then started on nerlynx in jan 2020 . \par SHe also started on zoladex and exemestane in 2019.  Now on femara July 2021 .\par Completed nerlynx May 2021 \par \par # breast cancer \par breast s/p neoadjuvant TCHP followed by bilateral mastectomy with node dissection , negative for residual disease , pCR. \par S/p 12 additional doses of Herceptin and perjeta\par doing well overalll , DUYEN \par PET/CT negative for any evidence of metastases, jan 2021\par tolerating  zoladex well \par s/p follow-up with Dr. Combs oct 2021 , s/p bx in nov negative - fat necrosis \par has mammo and mri jan 2022 \par completed nerlynx ( 1 year ) ( may 2021) \par back on letrozole , tolerating well , cont letrozole - started AI 2019 with vit D\par Proceed with Zoladex 3.6 mg monthly. \par C/o weight gain \par DEXA 3/22 - reviewed. normal\par MRI breast 2/2023\par Saw Dr. Combs 10/24\par \par # back pain \par worsening.\par xray - no suspicious lytic or blastic lesions\par ALKP wnl\par \par # lymphedema \par cont PT \par \par # obesity \par advise weight loss and exercise \par endo evaluation for Wegowy\par \par # osteopenia \par Continue vitamin D and exercise\par dexa shows osteopenia but it is worse , consider prolia or zometa once more stable \par \par # vit d def - 33, 5 k daily \par cont vit d , check level \par \par # joint pains \par could be due to ai or zoladex or other etiology ( autoimmune) \par no improvement with holding femara \par cont femara \par follow up rheumatology  encouraged \par \par # fatigue \par likely r/t to lingering sxs of recent RSV. \par \par RTC IN 3 MONTHS WITH CBC with diff, cmp, iron, b12, folate, ferritin, vit D, CA 15.3, CEA

## 2022-10-31 ENCOUNTER — APPOINTMENT (OUTPATIENT)
Dept: ENDOCRINOLOGY | Facility: CLINIC | Age: 47
End: 2022-10-31

## 2022-11-22 ENCOUNTER — LABORATORY RESULT (OUTPATIENT)
Age: 47
End: 2022-11-22

## 2022-11-22 ENCOUNTER — RESULT REVIEW (OUTPATIENT)
Age: 47
End: 2022-11-22

## 2022-11-22 ENCOUNTER — APPOINTMENT (OUTPATIENT)
Dept: HEMATOLOGY ONCOLOGY | Facility: CLINIC | Age: 47
End: 2022-11-22

## 2022-11-22 VITALS
DIASTOLIC BLOOD PRESSURE: 108 MMHG | BODY MASS INDEX: 36.14 KG/M2 | WEIGHT: 204 LBS | TEMPERATURE: 98.9 F | HEART RATE: 80 BPM | RESPIRATION RATE: 18 BRPM | OXYGEN SATURATION: 99 % | HEIGHT: 63 IN | SYSTOLIC BLOOD PRESSURE: 141 MMHG

## 2022-11-22 PROCEDURE — 99214 OFFICE O/P EST MOD 30 MIN: CPT

## 2022-11-22 NOTE — ASSESSMENT
[FreeTextEntry1] :  THis is a 48 y/o woman with a hx of her positive er positive a pT1cN1 stage 2a diagnosed October 2017 of left breast s/p neoadjuvant TCHP followed by bilateral mastectomy with node dissection , negative for residual disease , pCR. \par S/p 12 additional doses of Herceptin and perjeta. THen  radiation. Then started on nerlynx in jan 2020 . \par SHe also started on zoladex and exemestane in 2019.  Now on femara July 2021 .\par Completed nerlynx May 2021 \par \par # breast cancer \par breast s/p neoadjuvant TCHP followed by bilateral mastectomy with node dissection , negative for residual disease , pCR. \par S/p 12 additional doses of Herceptin and perjeta\par doing well overalll , DUYEN \par PET/CT negative for any evidence of metastases, jan 2021\par tolerating  zoladex well \par s/p follow-up with Dr. Combs oct 2021 , s/p bx in nov negative - fat necrosis \par has mammo and mri jan 2022 \par completed nerlynx ( 1 year ) ( may 2021) \par back on letrozole , tolerating well , cont letrozole - started AI 2019 with vit D. still has persistent joint pain. does not want to switch to anastrazole at this time \par Proceed with Zoladex 3.6 mg monthly. \par DEXA 3/22 - reviewed. normal\par MRI breast 2/2023\par Saw Dr. Combs 10/24/22- next follow up April 2023\par \par # back pain \par worsening.\par xray - no suspicious lytic or blastic lesions\par s/p neuro and PCP eval- ordered Lower spine MRI - will fax records once completed \par ALKP wnl\par \par # lymphedema \par will benefit from PT \par \par # obesity \par pt actively trying to lose weight \par continue healthy diet and exercise \par consider endo evaluation for Wegowy\par \par # osteopenia \par Continue vitamin D and exercise\par March 2022 dexa shows osteopenia but it is worse , consider prolia or zometa once more stable \par \par # vit d def - 33, 5 k daily \par cont vit d \par \par # joint pains \par could be due to ai or zoladex or other etiology ( autoimmune) \par no improvement with holding femara \par cont femara \par maintain rheum and neuro follow up \par \par # fatigue \par work up shows improved ferritin \par likely r/t to lingering sxs of recent RSV. \par \par # health maintenance\par up to date gyn \par due for colonoscopy \par up to date PCP \par \par #persistent cough \par check CT chest\par \par # RUQ lateral and rib pain?\par check CT abdomen \par \par RTC in 1 month with zoladex.

## 2022-11-22 NOTE — REVIEW OF SYSTEMS
[Fatigue] : fatigue [Dysphagia] : dysphagia [Cough] : cough [Diarrhea] : diarrhea [Joint Pain] : joint pain [Joint Stiffness] : joint stiffness [Anxiety] : anxiety [Negative] : Allergic/Immunologic [Fever] : no fever [Recent Change In Weight] : ~T recent weight change [Shortness Of Breath] : no shortness of breath [Wheezing] : no wheezing [SOB on Exertion] : no shortness of breath during exertion [Abdominal Pain] : no abdominal pain [FreeTextEntry2] : +headaches . intentionally trying to lose weight  [FreeTextEntry4] : sometimes feels like choking [FreeTextEntry7] : diarrhea stable [FreeTextEntry9] : back pain - see interval hx n

## 2022-11-22 NOTE — PHYSICAL EXAM
[Fully active, able to carry on all pre-disease performance without restriction] : Status 0 - Fully active, able to carry on all pre-disease performance without restriction [Normal] : affect appropriate [de-identified] : slightly diminished b/l lung bases. no rhonchi, rales, crackles, nor wheezing.  [de-identified] : b/l breast s/p mastectomy with implants . point tenderness upon palpation to left axillary incision and R lateral lower breast aspet.  [de-identified] : RUQ lateral aspect with point tenderness upon palpation

## 2022-11-22 NOTE — REASON FOR VISIT
[Follow-Up Visit] : a follow-up What Is The Reason For Today's Visit?: Full Body Skin Examination [FreeTextEntry2] : Breast cancer What Is The Reason For Today's Visit? (Being Monitored For X): concerning skin lesions on an annual basis No

## 2022-12-15 ENCOUNTER — RESULT REVIEW (OUTPATIENT)
Age: 47
End: 2022-12-15

## 2022-12-20 ENCOUNTER — RESULT REVIEW (OUTPATIENT)
Age: 47
End: 2022-12-20

## 2022-12-20 ENCOUNTER — APPOINTMENT (OUTPATIENT)
Dept: HEMATOLOGY ONCOLOGY | Facility: CLINIC | Age: 47
End: 2022-12-20

## 2022-12-20 VITALS
SYSTOLIC BLOOD PRESSURE: 136 MMHG | HEIGHT: 63 IN | TEMPERATURE: 98.2 F | BODY MASS INDEX: 36.14 KG/M2 | HEART RATE: 79 BPM | RESPIRATION RATE: 18 BRPM | WEIGHT: 204 LBS | DIASTOLIC BLOOD PRESSURE: 86 MMHG | OXYGEN SATURATION: 97 %

## 2022-12-20 DIAGNOSIS — U07.1 COVID-19: ICD-10-CM

## 2022-12-20 PROCEDURE — 99214 OFFICE O/P EST MOD 30 MIN: CPT | Mod: 25

## 2022-12-20 PROCEDURE — 36415 COLL VENOUS BLD VENIPUNCTURE: CPT

## 2022-12-20 NOTE — PHYSICAL EXAM
[Fully active, able to carry on all pre-disease performance without restriction] : Status 0 - Fully active, able to carry on all pre-disease performance without restriction [Normal] : affect appropriate [de-identified] : slightly diminished b/l lung bases. no rhonchi, rales, crackles, nor wheezing.  [de-identified] : b/l breast s/p mastectomy with implants . point tenderness upon palpation to left axillary incision and R lateral lower breast aspet.  [de-identified] : RUQ lateral aspect with point tenderness upon palpation

## 2022-12-20 NOTE — ASSESSMENT
[FreeTextEntry1] :  THis is a 46 y/o woman with a hx of her positive er positive a pT1cN1 stage 2a diagnosed October 2017 of left breast s/p neoadjuvant TCHP followed by bilateral mastectomy with node dissection , negative for residual disease , pCR. \par S/p 12 additional doses of Herceptin and perjeta. THen  radiation. Then started on nerlynx in jan 2020 . \par SHe also started on zoladex and exemestane in 2019.  Now on femara July 2021 .\par Completed nerlynx May 2021 \par \par # breast cancer \par - breast s/p neoadjuvant TCHP followed by bilateral mastectomy with node dissection , negative for residual disease, pCR. \par - s/p 12 additional doses of Herceptin and Perjeta\par - PET/CT 1/2021 negative for any evidence of metastases \par - completed 1 year of Nerlynx 5/2021 \par - s/p follow-up with Dr. Combs 10/2021, s/p bx in 11/2021 revealing fat necrosis \par - now back on Letrozole (originally started AI 2019) and monthly Zoladex with persistent joint pains does not want to switch - will continue\par - s/p CT CAP 12/16 report pending \par - MRI breast due 2/2023\par - Continue follow up with Dr. Combs due 4/2023 \par \par # back pain \par - xray - no suspicious lytic or blastic lesions\par - s/p neuro and PCP eval\par - MRI L spine 12/7/22 revealing disc protrusion \par - ALKP wnl\par \par # lymphedema \par - will benefit from PT \par - has not gone \par \par # obesity \par - pt actively trying to lose weight \par - continue healthy diet and exercise \par - consider endo evaluation for Wegowy\par \par #osteopenia \par - Continue vitamin D and exercise\par - 3/2022 DEXA shows osteopenia but it is worse, consider Prolia or Zometa once more stable \par \par #vit d def \par - cont vit d \par \par #joint pains \par - could be due to AI or Zoladex or other etiology (autoimmune) \par - no improvement with holding Letrozole \par - cont Letrozole \par - maintain rheum and neuro follow up \par \par # fatigue \par - work up shows improved ferritin \par - likely 2/2 lingering s/s of recent RSV\par \par #health maintenance\par - up to date gyn \par - due for colonoscopy \par - up to date PCP \par \par #persistent cough \par - 9/2022 CT chest no acute intrathoracic abnormality \par - s/p CT CAP 12/16 report pending\par \par # RUQ lateral and rib pain\par - s/p CT CAP 12/16 report pending \par \par RTO in 1 month with cbc with diff, cmp, vit D

## 2022-12-20 NOTE — REVIEW OF SYSTEMS
[Fatigue] : fatigue [Recent Change In Weight] : ~T recent weight change [Cough] : cough [Diarrhea] : diarrhea [Joint Pain] : joint pain [Joint Stiffness] : joint stiffness [Anxiety] : anxiety [Negative] : ENT [Fever] : no fever [Shortness Of Breath] : no shortness of breath [Wheezing] : no wheezing [SOB on Exertion] : no shortness of breath during exertion [Abdominal Pain] : no abdominal pain [FreeTextEntry6] : improved s/p RSV  [FreeTextEntry7] : diarrhea stable, on nitrofurantoin daily for frequent UTIs. Sees urogyn  [FreeTextEntry9] : back pain s/p MRI

## 2022-12-20 NOTE — HISTORY OF PRESENT ILLNESS
[de-identified] : Ms Jonas is a 47 year old woman who was diagnosed with a pT1cN1 stage 2a , ER + [place]+ HER 2 nicole positive breast cancer in October 2017, after self palpating a lump in the left. She had breast imaging at [facility] in [place]. Left breast biopsy was done at [facility] in [facility].\par \par She had neoadjuvant chemotherapy with Dr Pacheco, 11/2017 TCHP had pCR  followed by a year completion of HP.  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 On b12 shots .  Bilateral mastectomies with reconstruction with Askikari at [facility] which revealed no evidence of residual disease. She underwent post-operative radiation therapy with Dr Purdy at [facility].  She underwent menopause during chemotherapy, and then menses resumed 2019.  She then started ovarian suppression with Zoladex in March of 2019. She  then began exemestane in 2019.  \par Hx of chest pain - [facility] \par \par Began Nerlynx in January of 2020 .  + diarrhea, intermittent toe infections.  She had several breaks in Nerlyx treatments because of infection and respiratory illness.  Tested negative for COVID. Total of 13 weeks of breaks from Nerlynx.  She believes due to complete late April 2021. currently on 4 pills a day \par \par There have been several post operative infections and complication requiring multiple reconstructive surgeries.  Chronic lymphedema of both arms, also has had several episodes of bilateral chest wall cellullitis.\par hx of uti on spressive nitrofuriton.  hx of high bp \par \par She is here to transfer care. \par \par Sees rheumatology. a lot of joint pains. \par \par PETCT negative for any mets or lesions jan 2021 \par \par saw Dr. Combs- did R axilla US due to palpable lymph node ( was not sure if this was r/t J&J shot)\par \par less gi symptoms now off nerlynx( may 2021)  \par \par mri breast mammo 1/22/22 [de-identified] : Patient seen and examined today for follow up. \par She remains on Letrozole and Zoladex. Due today. Persistent joint pains with Letrozole. Not worse. \par She continues to follow with neuro, PCP, Dr. Combs. \par She is s/p CT CAP on 12/16. Report pending. \par \par Mammo/MRI: due 2/2023 \par DEXA: 3/2022 \par GYN: Dr. Kebede urotonya. Has had frequent UTIs on nitrofurantoin. Recommended gets tested q month. \par Colonoscopy: has not gone yet. Will be scheduling for 1/2023\par Sophie PCP- ordered lower spine MRI for back pain and persistent b/l neuropathy which showed bulging discs \par saw neurologist- Dr. Lynn-getting shots for her wrist. \par plan for sleep study \par has not gone to PT \par \par

## 2022-12-21 LAB
CANCER AG15-3 SERPL-ACNC: 9.9 U/ML
CEA SERPL-MCNC: 1.3 NG/ML

## 2022-12-29 ENCOUNTER — NON-APPOINTMENT (OUTPATIENT)
Age: 47
End: 2022-12-29

## 2023-01-17 ENCOUNTER — RESULT REVIEW (OUTPATIENT)
Age: 48
End: 2023-01-17

## 2023-01-17 ENCOUNTER — APPOINTMENT (OUTPATIENT)
Dept: HEMATOLOGY ONCOLOGY | Facility: CLINIC | Age: 48
End: 2023-01-17
Payer: COMMERCIAL

## 2023-01-17 VITALS
HEART RATE: 89 BPM | RESPIRATION RATE: 18 BRPM | BODY MASS INDEX: 36.14 KG/M2 | DIASTOLIC BLOOD PRESSURE: 85 MMHG | SYSTOLIC BLOOD PRESSURE: 131 MMHG | TEMPERATURE: 98.3 F | OXYGEN SATURATION: 99 % | WEIGHT: 204 LBS | HEIGHT: 63 IN

## 2023-01-17 PROCEDURE — 99213 OFFICE O/P EST LOW 20 MIN: CPT

## 2023-01-17 NOTE — PHYSICAL EXAM
[Fully active, able to carry on all pre-disease performance without restriction] : Status 0 - Fully active, able to carry on all pre-disease performance without restriction [Normal] : full range of motion and no deformities appreciated [de-identified] : b/l breast s/p mastectomy with implants . point tenderness upon palpation to left axillary incision.

## 2023-01-17 NOTE — HISTORY OF PRESENT ILLNESS
[de-identified] : Ms Jonas is a 47 year old woman who was diagnosed with a pT1cN1 stage 2a , ER + [place]+ HER 2 nicole positive breast cancer in October 2017, after self palpating a lump in the left. She had breast imaging at [facility] in [place]. Left breast biopsy was done at [facility] in [facility].\par \par She had neoadjuvant chemotherapy with Dr Pacheco, 11/2017 TCHP had pCR  followed by a year completion of HP.  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 On b12 shots .  Bilateral mastectomies with reconstruction with Askikari at [facility] which revealed no evidence of residual disease. She underwent post-operative radiation therapy with Dr Purdy at [facility].  She underwent menopause during chemotherapy, and then menses resumed 2019.  She then started ovarian suppression with Zoladex in March of 2019. She  then began exemestane in 2019.  \par Hx of chest pain - [facility] \par \par Began Nerlynx in January of 2020 .  + diarrhea, intermittent toe infections.  She had several breaks in Nerlyx treatments because of infection and respiratory illness.  Tested negative for COVID. Total of 13 weeks of breaks from Nerlynx.  She believes due to complete late April 2021. currently on 4 pills a day \par \par There have been several post operative infections and complication requiring multiple reconstructive surgeries.  Chronic lymphedema of both arms, also has had several episodes of bilateral chest wall cellullitis.\par hx of uti on spressive nitrofuriton.  hx of high bp \par \par She is here to transfer care. \par \par Sees rheumatology. a lot of joint pains. \par \par PETCT negative for any mets or lesions jan 2021 \par \par saw Dr. Combs- did R axilla US due to palpable lymph node ( was not sure if this was r/t J&J shot)\par \par less gi symptoms now off nerlynx( may 2021)  \par \par mri breast mammo 1/22/22 [de-identified] : Patient seen and examined today for follow up. \par She remains on Letrozole and Zoladex. \par She continues to follow with neuro, PCP, Dr. Combs. \par recently had flu-like symptoms x 7 days. went to urgent care. per pt COVID, RSV, FLU negative. gave her azythromycin. on day 3. t \par \par Mammo/MRI: due 2/2023 ( seeing Lauryn april 2023) . \par DEXA: 3/2022 \par GYN: Dr. Kebede urotonya. Has had frequent UTIs on nitrofurantoin. currently off of nitrofurantoin. had to reschedule. \par Colonoscopy: has not gone yet. Will be scheduling for 2023. \par Sophie PCP- ordered lower spine MRI for back pain and persistent b/l neuropathy which showed bulging discs \par saw neurologist- Dr. Lynn-getting shots for her wrist. \par has not gone to PT \par \par

## 2023-01-17 NOTE — REVIEW OF SYSTEMS
[Fatigue] : fatigue [Recent Change In Weight] : ~T recent weight change [Diarrhea] : diarrhea [Joint Pain] : joint pain [Joint Stiffness] : joint stiffness [Anxiety] : anxiety [Negative] : Allergic/Immunologic [Fever] : no fever [Shortness Of Breath] : no shortness of breath [Wheezing] : no wheezing [Cough] : no cough [SOB on Exertion] : no shortness of breath during exertion [Abdominal Pain] : no abdominal pain [FreeTextEntry7] : diarrhea stable, on nitrofurantoin daily for frequent UTIs. Sees urogyn

## 2023-01-17 NOTE — ASSESSMENT
[FreeTextEntry1] :  THis is a 48 y/o woman with a hx of her positive er positive a pT1cN1 stage 2a diagnosed October 2017 of left breast s/p neoadjuvant TCHP followed by bilateral mastectomy with node dissection , negative for residual disease , pCR. \par S/p 12 additional doses of Herceptin and perjeta. THen  radiation. Then started on nerlynx in jan 2020 . \par SHe also started on zoladex and exemestane in 2019.  Now on femara July 2021 .\par Completed nerlynx May 2021 \par \par # breast cancer \par - breast s/p neoadjuvant TCHP followed by bilateral mastectomy with node dissection , negative for residual disease, pCR. \par - s/p 12 additional doses of Herceptin and Perjeta\par - PET/CT 1/2021 negative for any evidence of metastases \par - completed 1 year of Nerlynx 5/2021 \par - s/p follow-up with Dr. Combs 10/2021, s/p bx in 11/2021 revealing fat necrosis \par - now back on Letrozole (originally started AI 2019) and monthly Zoladex with persistent joint pains does not want to switch - will continue\par - s/p CT CAP 12/16 no evidence of malignancy \par - MRI breast due 2/2023\par - Continue follow up with Dr. Combs due 4/2023 \par \par # back pain \par - xray - no suspicious lytic or blastic lesions\par - s/p neuro and PCP eval\par - MRI L spine 12/7/22 revealing disc protrusion \par - ALKP wnl\par \par # lymphedema \par - re-advised will benefit from PT \par - has not gone \par \par # obesity \par - pt actively trying to lose weight \par - continue healthy diet and exercise \par - consider endo evaluation for Wegowy\par \par #osteopenia \par - Continue vitamin D and exercise\par - 3/2022 DEXA shows osteopenia but it is worse, consider Prolia or Zometa once more stable \par \par #vit d def \par - cont vit d \par \par #joint pains \par - could be due to AI or Zoladex or other etiology (autoimmune) \par - no improvement with holding Letrozole \par - cont Letrozole \par - maintain rheum and neuro follow up \par \par # fatigue \par - work up shows improved ferritin \par - likely 2/2 lingering s/s of recent viral symptoms \par \par #health maintenance\par - overdue for urogyn \par - due for colonoscopy \par - up to date PCP \par \par #persistent cough \par - 9/2022 CT chest no acute intrathoracic abnormality \par - s/p CT CAP 12/16 negative for malignancy \par \par # RUQ lateral and rib pain\par - s/p CT CAP 12/16 report negative for mets \par \par # liver lesions \par dec 2022 CT scan shows subcm hypodense lesions R lobe. \par consider repeat in 6 months \par \par RTO in 1 month

## 2023-01-26 ENCOUNTER — RESULT REVIEW (OUTPATIENT)
Age: 48
End: 2023-01-26

## 2023-01-27 ENCOUNTER — NON-APPOINTMENT (OUTPATIENT)
Age: 48
End: 2023-01-27

## 2023-02-14 ENCOUNTER — RESULT REVIEW (OUTPATIENT)
Age: 48
End: 2023-02-14

## 2023-02-14 ENCOUNTER — APPOINTMENT (OUTPATIENT)
Dept: HEMATOLOGY ONCOLOGY | Facility: CLINIC | Age: 48
End: 2023-02-14
Payer: COMMERCIAL

## 2023-02-14 VITALS
DIASTOLIC BLOOD PRESSURE: 104 MMHG | SYSTOLIC BLOOD PRESSURE: 145 MMHG | BODY MASS INDEX: 36.14 KG/M2 | HEART RATE: 79 BPM | WEIGHT: 204 LBS | OXYGEN SATURATION: 97 % | TEMPERATURE: 98.6 F | RESPIRATION RATE: 18 BRPM

## 2023-02-14 DIAGNOSIS — B02.9 ZOSTER W/OUT COMPLICATIONS: ICD-10-CM

## 2023-02-14 PROCEDURE — 36415 COLL VENOUS BLD VENIPUNCTURE: CPT

## 2023-02-14 PROCEDURE — 99214 OFFICE O/P EST MOD 30 MIN: CPT | Mod: 25

## 2023-02-14 NOTE — PHYSICAL EXAM
[Fully active, able to carry on all pre-disease performance without restriction] : Status 0 - Fully active, able to carry on all pre-disease performance without restriction [Normal] : affect appropriate [de-identified] : b/l breast s/p mastectomy with implants . point tenderness upon palpation to left axillary incision.

## 2023-02-14 NOTE — ASSESSMENT
[FreeTextEntry1] :  47 year old female with a history of HER2+/ER+  pT1cN1 stage 2a diagnosed 10/2017 of L breast s/p neoadjuvant TCHP followed by b/l mastectomy with node dissection, negative for residual disease, pCR. S/p 12 additional doses of Herceptin and Perjeta. s/p radiation. Then started on nerlynx in 1/2020. She also started on Zoladex and Exemestane in 2019.  Now on Letrozole 1/2021. Completed nerlynx 5/2021. \par \par #breast cancer \par - s/p neoadjuvant TCHP followed by b/l mastectomy with node dissection negative for residual disease, pCR. \par - s/p 12 additional doses of Herceptin and Perjeta\par - PET/CT 1/2021 negative for any evidence of metastases \par - completed 1 year of Nerlynx 5/2021 \par - s/p follow-up with Dr. Combs 10/2021, s/p bx in 11/2021 revealing fat necrosis \par - now back on Letrozole (originally started AI 2019) and monthly Zoladex with persistent joint pains does not want to switch\par - s/p CT CAP 12/16 no evidence of malignancy \par - MRI breast - no suspicious findings over either implant\par - Continue follow up with Dr. Combs due 4/2023 \par \par # back pain \par - XR no suspicious lytic or blastic lesions\par - s/p neuro and PCP eval\par - MRI L spine 12/7/22 revealing disc protrusion \par - ALKP WNL \par \par # lymphedema \par - Re-advised will benefit from PT. Has not yet gone. \par \par #obesity \par - Pt actively trying to lose weight \par - Continue healthy diet and exercise \par - Consider endo evaluation for Wegowy\par \par #osteopenia \par - Continue vitamin D and exercise\par - 3/2022 DEXA shows osteopenia but it is worse, consider Prolia or Zometa once more stable \par \par #vit d def \par - Cont vit d \par \par #joint pains \par - Could be due to AI or Zoladex or other etiology (autoimmune) \par - No improvement with holding Letrozole \par - Cont Letrozole \par - Maintain rheum and neuro follow up \par \par #fatigue \par - Work up shows improved ferritin \par - Likely 2/2 lingering s/s of recent viral symptoms \par \par #health maintenance\par - Overdue for URO GYN \par - Due for colonoscopy \par - Up to date PCP \par \par #persistent cough \par - 9/2022 CT chest no acute intrathoracic abnormality \par - s/p CT CAP 12/16 negative for malignancy \par \par # RUQ lateral and rib pain\par - s/p CT CAP 12/16 report negative for mets \par \par # liver lesions \par - 12/2022 CT scan shows subcm hypodense lesions R lobe. \par - Consider repeat in 6 months \par \par #upper back rash\par - Non vesicular but with associated pain \par - Recommend tx for shingles  - will give 1 weeks worth of treatment with valacyclovir\par \par RTC in 1 month cbc with diff, cmp, UA with reflex, CEA, CA 15.3.

## 2023-02-14 NOTE — HISTORY OF PRESENT ILLNESS
[de-identified] : Ms Jonas is a 47 year old woman who was diagnosed with a pT1cN1 stage 2a , ER + OH+ HER 2 nicole positive breast cancer in October 2017, after self palpating a lump in the left. She had breast imaging at [facility] in [place]. Left breast biopsy was done at [facility] in [facility] [place] [place].\par \par She had neoadjuvant chemotherapy with Dr Pacheco, 11/2017 TCHP had pCR  followed by a year completion of HP.  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 On b12 shots .  Bilateral mastectomies with reconstruction with Askikari at [facility] which revealed no evidence of residual disease. She underwent post-operative radiation therapy with Dr Purdy at [facility].  She underwent menopause during chemotherapy, and then menses resumed 2019.  She then started ovarian suppression with Zoladex in March of 2019. She  then began exemestane in 2019.  \par Hx of chest pain - [facility] \par \par Began Nerlynx in January of 2020 .  + diarrhea, intermittent toe infections.  She had several breaks in Nerlyx treatments because of infection and respiratory illness.  Tested negative for COVID. Total of 13 weeks of breaks from Nerlynx.  She believes due to complete late April 2021. currently on 4 pills a day \par \par There have been several post operative infections and complication requiring multiple reconstructive surgeries.  Chronic lymphedema of both arms, also has had several episodes of bilateral chest wall cellullitis.\par hx of uti on spressive nitrofuriton.  hx of high bp \par \par She is here to transfer care. \par \par Sees rheumatology. a lot of joint pains. \par \par PETCT negative for any mets or lesions jan 2021 \par \par saw Dr. Combs- did R axilla US due to palpable lymph node ( was not sure if this was r/t J&J shot)\par \par less gi symptoms now off nerlynx( may 2021)  \par \par mri breast mammo 1/22/22 [de-identified] : Patient seen and examined today for follow up. \par She remains on Letrozole and Zoladex. Due today. \par She continues to follow with neuro, PCP, Dr. Combs. Last seen by Dr. Combs 1/27/23. \par \par MRI 1/2023\par DEXA: 3/2022 \par GYN: Dr. Delio steen. Has had frequent UTIs on nitrofurantoin. Currently off of nitrofurantoin\par Colonoscopy: has not gone yet. Will be scheduling for 2023. \par [facility] PCP- ordered lower spine MRI for back pain and persistent b/l neuropathy which showed bulging discs \par Neurologist: Dr. Lynn-getting shots for her wrist. \par Has not gone to PT \par She has been having recurrent respiratory infections most recently bronchitis. Still feels chest tightness. \par She notes upper back pain radiating across L and R with a midline erythematous rash, Has had chicken pox in the past. It is not itchy. \par \par

## 2023-03-14 LAB — 25(OH)D3 SERPL-MCNC: 58.8 NG/ML

## 2023-03-15 ENCOUNTER — RESULT REVIEW (OUTPATIENT)
Age: 48
End: 2023-03-15

## 2023-03-15 ENCOUNTER — APPOINTMENT (OUTPATIENT)
Dept: HEMATOLOGY ONCOLOGY | Facility: CLINIC | Age: 48
End: 2023-03-15
Payer: COMMERCIAL

## 2023-03-15 VITALS
SYSTOLIC BLOOD PRESSURE: 144 MMHG | HEIGHT: 63 IN | TEMPERATURE: 99 F | DIASTOLIC BLOOD PRESSURE: 87 MMHG | WEIGHT: 203 LBS | BODY MASS INDEX: 35.97 KG/M2 | OXYGEN SATURATION: 99 % | RESPIRATION RATE: 18 BRPM | HEART RATE: 107 BPM

## 2023-03-15 PROCEDURE — 99214 OFFICE O/P EST MOD 30 MIN: CPT

## 2023-03-16 RX ORDER — VALACYCLOVIR 1 G/1
1 TABLET, FILM COATED ORAL 3 TIMES DAILY
Qty: 21 | Refills: 0 | Status: DISCONTINUED | COMMUNITY
Start: 2023-02-14 | End: 2023-03-16

## 2023-03-16 RX ORDER — NIRMATRELVIR AND RITONAVIR 300-100 MG
20 X 150 MG & KIT ORAL
Qty: 1 | Refills: 0 | Status: DISCONTINUED | COMMUNITY
Start: 2022-12-20 | End: 2023-03-16

## 2023-03-16 RX ORDER — IPRATROPIUM BROMIDE 17 UG/1
17 AEROSOL, METERED RESPIRATORY (INHALATION) 4 TIMES DAILY
Qty: 1 | Refills: 0 | Status: DISCONTINUED | COMMUNITY
Start: 2022-08-30 | End: 2023-03-16

## 2023-03-16 NOTE — PHYSICAL EXAM
[Fully active, able to carry on all pre-disease performance without restriction] : Status 0 - Fully active, able to carry on all pre-disease performance without restriction [Normal] : affect appropriate [de-identified] : b/l breast s/p mastectomy with implants no obvious masses nor lesions. no axillary lymphadenopathy b/l

## 2023-03-16 NOTE — HISTORY OF PRESENT ILLNESS
[de-identified] : Ms Jonas is a 47 year old woman who was diagnosed with a pT1cN1 stage 2a , ER + SD+ HER 2 nicole positive breast cancer in October 2017, after self palpating a lump in the left. She had breast imaging at [facility] in [place]. Left breast biopsy was done at [facility] in [facility].\par \par She had neoadjuvant chemotherapy with Dr Pacheco, 11/2017 TCHP had pCR  followed by a year completion of HP.  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 On b12 shots .  Bilateral mastectomies with reconstruction with Askikari at [facility] which revealed no evidence of residual disease. She underwent post-operative radiation therapy with Dr Purdy at [facility].  She underwent menopause during chemotherapy, and then menses resumed 2019.  She then started ovarian suppression with Zoladex in March of 2019. She  then began exemestane in 2019.  \par Hx of chest pain - [facility] \par \par Began Nerlynx in January of 2020 .  + diarrhea, intermittent toe infections.  She had several breaks in Nerlyx treatments because of infection and respiratory illness.  Tested negative for COVID. Total of 13 weeks of breaks from Nerlynx.  She believes due to complete late April 2021. currently on 4 pills a day \par \par There have been several post operative infections and complication requiring multiple reconstructive surgeries.  Chronic lymphedema of both arms, also has had several episodes of bilateral chest wall cellullitis.\par hx of uti on spressive nitrofuriton.  hx of high bp \par \par She is here to transfer care. \par \par Sees rheumatology. a lot of joint pains. \par \par PETCT negative for any mets or lesions jan 2021 \par \par saw Dr. Combs- did R axilla US due to palpable lymph node ( was not sure if this was r/t J&J shot)\par \par less gi symptoms now off nerlynx( may 2021)  \par \par mri breast mammo 1/22/22 [de-identified] : Patient seen and examined today for follow up. \par She remains on Letrozole and Zoladex. Due today. \par She continues to follow with neuro, PCP, Dr. Combs. Last seen by Dr. Combs 1/27/23. \par completed shingles- tx. valtrex. \par \par MRI 1/2023\par DEXA: 3/2022 \par GYN: Dr. Kebede urotonya. Has had frequent UTIs on nitrofurantoin. Currently off of nitrofurantoin\par Colonoscopy: has not gone yet. Will be scheduling for 2023 with Dr. Perry. \par [facility] PCP- ordered lower spine MRI for back pain and persistent b/l neuropathy which showed bulging discs \par Neurologist: Dr. Lynn-getting shots for her wrist. \par Has not gone to PT

## 2023-03-16 NOTE — ASSESSMENT
[FreeTextEntry1] :  47 year old female with a history of HER2+/ER+  pT1cN1 stage 2a diagnosed 10/2017 of L breast s/p neoadjuvant TCHP followed by b/l mastectomy with node dissection, negative for residual disease, pCR. S/p 12 additional doses of Herceptin and Perjeta. s/p radiation. Then started on nerlynx in 1/2020. She also started on Zoladex and Exemestane in 2019.  Now on Letrozole 1/2021. Completed nerlynx 5/2021. \par \par #breast cancer \par - s/p neoadjuvant TCHP followed by b/l mastectomy with node dissection negative for residual disease, pCR. \par - s/p 12 additional doses of Herceptin and Perjeta\par - PET/CT 1/2021 negative for any evidence of metastases \par - completed 1 year of Nerlynx 5/2021 \par - s/p follow-up with Dr. Combs 10/2021, s/p bx in 11/2021 revealing fat necrosis \par - now back on Letrozole (originally started AI 2019) and monthly Zoladex with persistent joint pains does not want to switch\par - s/p CT CAP 12/16/22 no evidence of malignancy \par - MRI breast - no suspicious findings over either implant\par - Continue follow up with Dr. Combs due 4/20/2023 \par \par # back pain \par - XR no suspicious lytic or blastic lesions\par - s/p neuro and PCP eval\par - MRI L spine 12/7/22 revealing disc protrusion \par - ALKP WNL \par \par # lymphedema \par - Re-advised will benefit from PT. Has not yet gone. \par \par #obesity \par - Pt actively trying to lose weight \par - Continue healthy diet and exercise \par - Consider endo evaluation for Wegowy\par \par #osteopenia \par - Continue vitamin D and exercise\par - 3/2022 DEXA shows osteopenia but it is worse, consider Prolia or Zometa once more stable \par \par #vit d def \par - Cont vit d \par \par #joint pains \par - Could be due to AI or Zoladex or other etiology (autoimmune) \par - No improvement with holding Letrozole \par - Cont Letrozole \par - Maintain rheum and neuro follow up \par \par #fatigue \par - Work up shows improved ferritin \par - Likely 2/2 lingering s/s of recent viral symptoms and/or AI\par \par #health maintenance\par - Overdue for URO GYN \par - Due for colonoscopy - pt will make appt with Dr. Perry- deferred cologuard today\par - Up to date PCP \par \par # RUQ lateral and rib pain\par - s/p CT CAP 12/16 report negative for mets \par \par # liver lesions \par - 12/2022 CT scan shows subcm hypodense lesions R lobe. \par - repeat MRI abdomen june 2023\par \par RTC in 1 month

## 2023-03-16 NOTE — REVIEW OF SYSTEMS
[Fatigue] : fatigue [Recent Change In Weight] : ~T recent weight change [Diarrhea] : diarrhea [Joint Pain] : joint pain [Joint Stiffness] : joint stiffness [Muscle Pain] : muscle pain [Anxiety] : anxiety [Hot Flashes] : hot flashes [Negative] : Integumentary [Fever] : no fever [Shortness Of Breath] : no shortness of breath [Wheezing] : no wheezing [Cough] : no cough [SOB on Exertion] : no shortness of breath during exertion [Abdominal Pain] : no abdominal pain [Skin Rash] : no skin rash [FreeTextEntry2] : fatigue but able to carry on ADL and is functional.  [FreeTextEntry8] : held macrobid

## 2023-04-12 ENCOUNTER — RESULT REVIEW (OUTPATIENT)
Age: 48
End: 2023-04-12

## 2023-04-12 ENCOUNTER — APPOINTMENT (OUTPATIENT)
Dept: HEMATOLOGY ONCOLOGY | Facility: CLINIC | Age: 48
End: 2023-04-12
Payer: COMMERCIAL

## 2023-04-12 VITALS
TEMPERATURE: 98.4 F | DIASTOLIC BLOOD PRESSURE: 96 MMHG | BODY MASS INDEX: 36.5 KG/M2 | RESPIRATION RATE: 18 BRPM | OXYGEN SATURATION: 97 % | WEIGHT: 206 LBS | HEART RATE: 88 BPM | SYSTOLIC BLOOD PRESSURE: 135 MMHG | HEIGHT: 63 IN

## 2023-04-12 LAB
25(OH)D3 SERPL-MCNC: 51.9 NG/ML
APPEARANCE: CLEAR
BACTERIA: NEGATIVE
BILIRUBIN URINE: NEGATIVE
BLOOD URINE: NEGATIVE
COLOR: NORMAL
FERRITIN SERPL-MCNC: 53 NG/ML
FOLATE SERPL-MCNC: 19.6 NG/ML
GLUCOSE QUALITATIVE U: NEGATIVE
HYALINE CASTS: 0 /LPF
IRON SATN MFR SERPL: 39 %
IRON SERPL-MCNC: 121 UG/DL
KETONES URINE: NEGATIVE
LEUKOCYTE ESTERASE URINE: NEGATIVE
MICROSCOPIC-UA: NORMAL
NITRITE URINE: NEGATIVE
PH URINE: 7
PROTEIN URINE: NEGATIVE
RED BLOOD CELLS URINE: 1 /HPF
SPECIFIC GRAVITY URINE: 1
SQUAMOUS EPITHELIAL CELLS: 1 /HPF
TIBC SERPL-MCNC: 309 UG/DL
UIBC SERPL-MCNC: 188 UG/DL
UROBILINOGEN URINE: NORMAL
VIT B12 SERPL-MCNC: 923 PG/ML
WHITE BLOOD CELLS URINE: 0 /HPF

## 2023-04-12 PROCEDURE — 99214 OFFICE O/P EST MOD 30 MIN: CPT

## 2023-04-12 RX ORDER — TRIAMCINOLONE ACETONIDE 55 UL/1
SPRAY, METERED NASAL
Refills: 0 | Status: ACTIVE | COMMUNITY

## 2023-04-14 NOTE — ASSESSMENT
[FreeTextEntry1] : Ms. Jonas is a 47 year old female with a history of left HER2+/ER+ pT1cN1 stage 2a breast cancer diagnosed 10/2017 s/p neoadjuvant TCHP followed by b/l mastectomy with node dissection, negative for residual disease, pCR. She is s/p 12 additional doses of Herceptin/Perjeta. She is s/p radiation. She started Nerlynx in 1/2020. She also started on Zoladex and Exemestane in 2019.  Now on Letrozole 1/2021. Completed Nerlynx 5/2021. \par \par #Breast Cancer \par - s/p neoadjuvant TCHP followed by b/l mastectomy with node dissection negative for residual disease, pCR\par - s/p 12 doses of Herceptin/Perjeta\par - PET/CT 1/2021 negative for any evidence of metastases \par - Completed 1 year of Nerlynx 5/2021 \par - s/p bx in 11/2021 revealing fat necrosis \par - Started AI 2019 and switched to Letrozole with monthly Zoladex. She continues to experience persistent stable joint pains diffusely. Patient declines switching to another AI. \par - s/p CT CAP 12/16/22 no evidence of malignancy with subcentimeter liver lesions. Follow up MRI Abd 5/2023. \par - s/p MRI breast 1/2023 BIRADS 2 \par - Continue follow up with Dr. Combs, next appointment scheduled 4/2023 \par \par #Back pain \par - XR no suspicious lytic or blastic lesions\par - s/p neuro and PCP eval\par - MRI L spine 12/7/22 revealing disc protrusion \par - ALKP remains within normal limits \par - Continue follow up with Neuro \par \par #Lymphedema \par - Re-advised will benefit from PT\par - Patient has not gone \par \par #Bone Density  \par - DEXA 3/2022 L spine T score -0.9, L femoral neck T score -0.2, L femur T score -0.3. Normal bone density. \par - Continue vitamin D and weight bearing exercises \par \par #Vit D def \par - Cont vit d \par - Continue to monitor levels \par \par #Joint pains \par - 2/2 AI or Zoladex. Patient declines switching AI. \par - Continue Letrozole \par - Continue follow up with Rheum/Neuro/PCP \par \par #Fatigue \par - Likely 2/2 lingering s/s of recent viral symptoms and/or AI\par - Continue to monitor iron studies, B12/folate, TFTs\par \par #Health Maintenance\par - UroGYN: Dr. Kebede continues on ppx nitrofurantoin\par - CNY scheduled 5/2023 with Dr. Verdugo\par - PCP Dr. Barrios. Continue follow up as instructed. \par - Neuro Dr. Cox. Continue follow up as instructed. \par \par #Abd Pain/Liver Lesions \par - s/p CT CAP 12/2022 negative. Noted subcentimeter liver lesions. \par - Follow up MRI abd for assessment of liver lesions 5/2023 \par \par #Intermittent HA \par - Endorses intermittent HA occipital and around eye\par - No associated neuro s/s\par - If persistent, can consider MRI brain \par - Continue follow up with Neuro \par \par #Diarrhea \par - Worse in morning \par - Patient attributes to Letrozole use \par - Imodium PRN \par - BRAT diet \par - Advised importance of CNY. Scheduled with Dr. Verdugo 5/2023. \par \par #Hair thinning\par - 2/2 AI use with Letrozole \par - Offered Minoxidil 2.5mg PO. Patient declines. \par \par RTC in 1 month with next monthly Zoladex with CBC with diff, CMP, UA

## 2023-04-14 NOTE — PHYSICAL EXAM
[Fully active, able to carry on all pre-disease performance without restriction] : Status 0 - Fully active, able to carry on all pre-disease performance without restriction [Normal] : affect appropriate [de-identified] : no supraclavicular or infraclavicular adenopathy  [de-identified] : b/l mastectomy with silicone implants symmetrical, no obvious masses or lesions, no skin dimpling, no axillary adenopathy

## 2023-04-14 NOTE — HISTORY OF PRESENT ILLNESS
[de-identified] : Ms Jonas is a 47 year old woman who was diagnosed with a pT1cN1 stage 2a , ER + PA+ HER 2 nicole positive breast cancer in October 2017, after self palpating a lump in the left. She had breast imaging at [facility] in [place]. Left breast biopsy was done at [facility] in [facility].\par \par She had neoadjuvant chemotherapy with Dr Pacheco, 11/2017 TCHP had pCR  followed by a year completion of HP.  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 On b12 shots .  Bilateral mastectomies with reconstruction with Askikari at [facility] which revealed no evidence of residual disease. She underwent post-operative radiation therapy with Dr Purdy at [facility].  She underwent menopause during chemotherapy, and then menses resumed 2019.  She then started ovarian suppression with Zoladex in March of 2019. She  then began exemestane in 2019.  \par Hx of chest pain - [facility] \par \par Began Nerlynx in January of 2020 .  + diarrhea, intermittent toe infections.  She had several breaks in Nerlyx treatments because of infection and respiratory illness.  Tested negative for COVID. Total of 13 weeks of breaks from Nerlynx.  She believes due to complete late April 2021. currently on 4 pills a day \par \par There have been several post operative infections and complication requiring multiple reconstructive surgeries.  Chronic lymphedema of both arms, also has had several episodes of bilateral chest wall cellullitis.\par hx of uti on spressive nitrofuriton.  hx of high bp \par \par She is here to transfer care. \par \par Sees rheumatology. a lot of joint pains. \par \par PETCT negative for any mets or lesions jan 2021 \par \par saw Dr. Combs- did R axilla US due to palpable lymph node ( was not sure if this was r/t J&J shot)\par \par less gi symptoms now off nerlynx( may 2021)  \par \par mri breast mammo 1/22/22 [de-identified] : Patient seen and examined today for follow up for the management of history of breast cancer. She remains on Letrozole and Zoladex. Zoldex due today.  She continues to follow with neuro, PCP and Dr. Combs. She was last seen by Dr. Combs 1/27/23 and her next appointment is scheduled for 4/20/23. She reports feeling overall the same since her last visit. She continues to experience joint pains diffusely and declines switching AIs. She notes at most times she is in 6/10 pain that she tolerates. She endorses one day of HA to occipital area and around eye without other associated neuro s/s. She also endorses persistent hair loss. She has not tried any over the counter supplementation. At previous visit she was noted to have pain and associated rash to mid back s/p Valtrex with resolution to back rash. She denies any new rashes. She has not gone to PT.\par \par MRI Breast:1/2023 BIRADS 2 \par DEXA: 3/2022 normal bone density \par CT CAP: 12/2022 subcentimeter liver lesions\par MRI L spine: 12/2022 L2-3 disc protrusion\par GYN: She continues to follow with Dr. Delio Jean Baptiste. Has had frequent UTIs on nitrofurantoin. She took a break from nitrofurantoin but started again per instruction of UroGY[facility].\par Colonoscopy: Scheduled with Dr. Verdugo 5/2023 \par PCP: Dr. Barrios and has upcoming follow up \par Neurologist: Dr. Lynn and has upcoming follow up \par

## 2023-04-14 NOTE — REVIEW OF SYSTEMS
[Fatigue] : fatigue [Recent Change In Weight] : ~T recent weight change [Diarrhea] : diarrhea [Joint Pain] : joint pain [Joint Stiffness] : joint stiffness [Muscle Pain] : muscle pain [Anxiety] : anxiety [Hot Flashes] : hot flashes [Negative] : Allergic/Immunologic [Palpitations] : palpitations [Fever] : no fever [Chills] : chills [Night Sweats] : no night sweats [Shortness Of Breath] : no shortness of breath [Wheezing] : no wheezing [Cough] : no cough [SOB on Exertion] : no shortness of breath during exertion [Abdominal Pain] : no abdominal pain [Skin Rash] : no skin rash [FreeTextEntry2] : +gained 3lbs since last visit  [FreeTextEntry3] : +follows with optho  [FreeTextEntry5] : +intermittent palpitations  [FreeTextEntry7] : +persistent diarrhea in the mornings takes Imodium PRN, seeing GI Dr. Verdugo for CNY  [FreeTextEntry8] : +on ppx nitrofurantoin per UroGYN Dr. Kebede [de-identified] : +hair thinning

## 2023-04-16 NOTE — ASSESSMENT
[FreeTextEntry1] : The patient is a 47-year-old  still premenopausal white female of Upper sorbian, English, Sweedish, Gibraltarian, Darlin, Italian, and  descent.  She underwent menarche at age 12 and had her first child at age 32.  She has a strong family history of breast and ovarian cancer with her maternal great aunt who had ovarian cancer who passed away.  She has a maternal second cousin who had breast cancer in her late 40s.  Her paternal grandfather passed away from colorectal cancer at age 65.  Her maternal grandfather had colon cancer around age 75.  She has 4 maternal great uncles who had esophageal and throat cancer and a paternal great aunt who had uterine/cervical cancer.  A paternal great uncle had lung cancer.  The patient underwent bilateral augmentation implants in  requiring multiple replacements for ruptures last exchanged in .  She noticed a density towards the lateral aspect of the left breast and mammography showed suspicious left retroareolar calcifications.  Ultrasound showed multiple suspicious areas in the left breast 2:00, 3:00, as well as a suspicious left axillary lymph node.  Ultrasound-guided core biopsies on 2017 showed the left retroareolar density to be an intermediate grade invasive duct cancer with surrounding DCIS which was ER positive at 80%, NH negative, and HER-2 3+ by IHC.  A separate left breast 2:00 density 5 cm from the nipple turned out to be metastatic cancer in an intramammary lymph node which was ER/NH positive and HER-2 positive.  The left axillary suspicious lymph node was also positive.  She had clips placed in all the biopsied lesions as well as in a separate left breast 2:00 and 9:00 density which turned out to be a fibroadenoma and fibrocystic change.  An MRI performed on 2017 showed the right breast to be negative but the left breast showed extensive enhancement and PET scan showed no distant disease.  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  She underwent neoadjuvant TCHP chemotherapy through Dr. Pacheco.  MRI after neoadjuvant chemotherapy on 2018 showed no further enhancement.  I then performed bilateral total mastectomies through a Wise reduction pattern incision on April 10, 2018 and she had bilateral expander reconstructions with AlloDerm.  She had a sentinel lymph node biopsy with localization of the nodes preoperatively and both the intramammary as well as axillary lymph nodes were all negative.  She ended up with another 12 nodes removed which were negative since the localization was not perfectly placed.  The left mastectomy showed no residual cancer and the right mastectomy was negative.  She did undergo a small wound revision of the left breast by Dr. Nava on May 23, 2018.  She is following up with Dr. Pacheco and finished Herceptin and Perjeta and underwent postmastectomy radiation therapy at [facility] which finished on 2018.  She developed some redness in the left breast and was admitted to [place] and placed on IV vancomycin for 3 days and the redness resolved.  She developed bilateral upper extremity lymphedema for which she wears compression sleeves.  She developed redness over the left implant and was placed on Levaquin in 2018 and the redness improved.  She again was placed on Cipro in 2019 for some cellulitis over the right implant.  She underwent fat grafting and removal of her port in 2019 and then had her implants removed and exchanged by Dr. Ye in 2019 for cosmesis.  She now follows up with Dr. Philippe since Dr. Holly Walker has left and she is continuing on Zoladex as well as Aromasin and received neratinib which finished in May 2021.  She did have some neuropathy and follows up with Dr. Stephen.  On exam, I cannot feel any suspicious densities over either implant.  She has stable bilateral arm lymphedema.  There was a small density felt over the upper inner aspect of the left implant and I sent her for directed left breast ultrasound which was performed on 3/22/2021 showing no suspicious findings in that area.  Her redness has since improved and the density is no longer palpable.  She did have this soft palpable lymph node in the upper right axillary region but directed axillary ultrasound on May 7, 2021 only showed normal axillary lymph nodes.  She did have her 2nd COVID vaccination in 2021.  The patient then underwent a bilateral breast MRI ordered by medical oncology on 2021 which showed some enhancement in the upper inner aspect of the right breast and she was sent for targeted mammography and ultrasound of the right breast on 2021 just showing some developing fat necrosis likely from her fat injections in the past.  A follow-up diagnostic MRI and right breast mammography was performed on 2022 and the mammography just showed a "coil" clip in the right breast around a previously biopsied benign density and the MRI showed no suspicious findings.  She also had a follow-up diagnostic right breast ultrasound on 2022 performed here [facility] just showing this right breast 2:00 previously biopsied area of fat necrosis to have decreased in size.  Her last bilateral breast MRI was performed on 2023 which was reviewed from [facility] which showed no suspicious areas of enhancement.  Otherwise, she should remain on Zoladex and Aromasin, through Dr. Philippe.  I would like to see her again in 1 year in 2024 and she no longer requires routine diagnostic imaging given her bilateral mastectomies.

## 2023-04-16 NOTE — HISTORY OF PRESENT ILLNESS
[FreeTextEntry1] : The patient is a 47-year-old  still premenopausal white female of Serbian, English, Sweedish, [place]ican, Darlin, Setswana, and  descent.  She underwent menarche at age 12 and had her first child at age 32.  She has a strong family history of breast and ovarian cancer with her maternal great aunt who had ovarian cancer who passed away.  She has a maternal second cousin who had breast cancer in her late 40s.  Her paternal grandfather passed away from colorectal cancer at age 65.  Her maternal grandfather had colon cancer around age 75.  She has 4 maternal great uncles who had esophageal and throat cancer and a paternal great aunt who had uterine/cervical cancer.  A paternal great uncle had lung cancer.  The patient underwent bilateral augmentation implants in  requiring multiple replacements for ruptures last exchanged in .  She noticed a density towards the lateral aspect of the left breast and mammography showed suspicious left retroareolar calcifications.  Ultrasound showed multiple suspicious areas in the left breast 2:00, 3:00, as well as a suspicious left axillary lymph node.  Ultrasound-guided core biopsies on 2017 showed the left retroareolar density to be an intermediate grade invasive duct cancer with surrounding DCIS which was ER positive at 80%, MT negative, and HER-2 3+ by IHC.  A separate left breast 2:00 density 5 cm from the nipple turned out to be metastatic cancer in an intramammary lymph node which was ER/MT positive and HER-2 positive.  The left axillary suspicious lymph node was also positive.  She had clips placed on all the biopsied lesions as well as in a separate left breast 2:00 and 9:00 density which turned out to be a fibroadenoma and fibrocystic change.  An MRI performed on 2017 showed the right breast to be negative but the left breast showed extensive enhancement and PET scan showed no distant disease.  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  She underwent neoadjuvant TCHP chemotherapy through Dr. Pacheco.  MRI after neoadjuvant chemotherapy on 2018 showed no further enhancement.  I then performed bilateral total mastectomies through a Wise reduction pattern incision on April 10, 2018 and she had bilateral expander reconstructions with AlloDerm.  She had a sentinel lymph node biopsy with localization of the nodes preoperatively and both the intramammary as well as axillary lymph nodes were all negative.  She ended up with another 12 nodes removed which were negative since the localization was not perfectly placed.  The left mastectomy showed no residual cancer and the right mastectomy was negative.  She did undergo a small wound revision of the left breast by Dr. Nava on May 23, 2018.  She is following up with Dr. Pacheco and finished Herceptin and Perjeta and underwent postmastectomy radiation therapy at [facility] which finished on 2018.  She developed some redness in the left breast and was admitted to [place] and placed on IV vancomycin for 3 days and the redness resolved.  She developed bilateral upper extremity lymphedema for which she wears compression sleeves.  She developed redness over the left implant and was placed on Levaquin in 2018 and the redness improved.  She again was placed on Cipro in 2019 for some cellulitis over the right implant.  She underwent fat grafting and removal of her port in 2019 and then had her implants removed and exchanged by Dr. Ye in 2019 for cosmesis.  She now follows up with Dr. Philippe since Dr. Holly Walker left and she is continuing on Zoladex as well as Aromasin and finished neratinib in May 2021.  She did have some neuropathy and follows up with Dr. Stephen.  She had a questionable lymph node felt in her lower right axilla in May 2021 but directed ultrasound just showed normal lymph nodes.  MRI on 2021 showed some enhancement in the upper inner aspect of the right breast but mammography and ultrasound on 2021 and follow-up MRI in 2022 just showed some areas of fat necrosis.  She comes in now for routine follow-up.

## 2023-04-16 NOTE — REASON FOR VISIT
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  The cancer was ER positive, ND negative, and HER-2 3+ and she underwent neoadjuvant TCHP chemotherapy through Dr. Pacheco.  She had bilateral total mastectomies through a Wise reduction pattern on April 10, 2018 and had bilateral expander reconstructions with AlloDerm.  Her nodes were all negative after neoadjuvant chemotherapy.  She finished Herceptin and Perjeta and had postmastectomy radiation and did develop some bouts of cellulitis and bilateral arm lymphedema.  She had her expanders exchanged for implants in 2019 and is currently on Zoladex, Aromasin, and is on neratinib and comes in for an interval follow-up with a questionable palpable right axillary lymph node felt by her medical oncologist.

## 2023-04-16 NOTE — REVIEW OF SYSTEMS
[Feeling Poorly] : feeling poorly [Feeling Tired] : feeling tired [Recent Weight Gain (___ Lbs)] : recent [unfilled] ~Ulb weight gain [Nasal Discharge] : nasal discharge [Sore Throat] : sore throat [Hoarseness] : hoarseness [Shortness Of Breath] : shortness of breath [Chest Pain] : chest pain [Diarrhea] : diarrhea [As Noted in HPI] : as noted in HPI [Joint Swelling] : joint swelling [Joint Stiffness] : joint stiffness [Limb Pain] : limb pain [Breast Pain] : breast pain [Hot Flashes] : hot flashes [Breast Lump] : breast lump [Negative] : Heme/Lymph [FreeTextEntry8] : burning urination

## 2023-04-16 NOTE — PHYSICAL EXAM
[Normocephalic] : normocephalic [EOMI] : extra ocular movement intact [Atraumatic] : atraumatic [Supple] : supple [No Supraclavicular Adenopathy] : no supraclavicular adenopathy [No Cervical Adenopathy] : no cervical adenopathy [Examined in the supine and seated position] : examined in the supine and seated position [No dominant masses] : no dominant masses in right breast  [No dominant masses] : no dominant masses left breast [Breast Mass Right Breast ___cm] : no masses [Breast Mass Left Breast ___cm] : no masses [___cm] : ~M [unfilled] ~Ucm upper inner quadrant mass [No Axillary Lymphadenopathy] : no left axillary lymphadenopathy [No Ulceration] : no ulceration [No Rashes] : no rashes [de-identified] : On exam, the patient has obvious bilateral mastectomies with implant reconstructions with a Nielsen pattern incision.  I cannot feel any suspicious densities over either implant.  She has no supraclavicular, or cervical adenopathy.  Her left skin redness has improved and the previous medial density is no longer palpable. [de-identified] : Status post mastectomy and implant reconstruction with Wise pattern approach [de-identified] : Status post mastectomy and implant reconstruction with Wise pattern approach and later postmastectomy radiation.  Improved redness over upper aspect of the left implant with resolution of her previous medial breast density [de-identified] : Stable upper extremity mild lymphedema [de-identified] : Soft slightly enlarged right axillary lymph node

## 2023-04-20 ENCOUNTER — APPOINTMENT (OUTPATIENT)
Dept: BREAST CENTER | Facility: CLINIC | Age: 48
End: 2023-04-20
Payer: COMMERCIAL

## 2023-05-03 ENCOUNTER — RESULT REVIEW (OUTPATIENT)
Age: 48
End: 2023-05-03

## 2023-05-04 ENCOUNTER — APPOINTMENT (OUTPATIENT)
Dept: BREAST CENTER | Facility: CLINIC | Age: 48
End: 2023-05-04
Payer: COMMERCIAL

## 2023-05-04 VITALS
BODY MASS INDEX: 36.49 KG/M2 | HEART RATE: 66 BPM | SYSTOLIC BLOOD PRESSURE: 154 MMHG | WEIGHT: 206 LBS | DIASTOLIC BLOOD PRESSURE: 120 MMHG | OXYGEN SATURATION: 99 %

## 2023-05-04 PROCEDURE — 99213 OFFICE O/P EST LOW 20 MIN: CPT

## 2023-05-04 NOTE — PHYSICAL EXAM
[Normocephalic] : normocephalic [Atraumatic] : atraumatic [EOMI] : extra ocular movement intact [Supple] : supple [No Supraclavicular Adenopathy] : no supraclavicular adenopathy [No Cervical Adenopathy] : no cervical adenopathy [Examined in the supine and seated position] : examined in the supine and seated position [No dominant masses] : no dominant masses in right breast  [No dominant masses] : no dominant masses left breast [Breast Mass Right Breast ___cm] : no masses [Breast Mass Left Breast ___cm] : no masses [___cm] : ~M [unfilled] ~Ucm upper inner quadrant mass [No Axillary Lymphadenopathy] : no left axillary lymphadenopathy [No Rashes] : no rashes [No Ulceration] : no ulceration [de-identified] : On exam, the patient has obvious bilateral mastectomies with implant reconstructions with a Nielsen pattern incision.  Left breast is noticeably smaller than the right secondary to the previous radiation therapy.  I cannot feel any suspicious densities over either implant.  She has no supraclavicular, or cervical adenopathy.  Her left skin redness has improved and the previous medial density is no longer palpable. [de-identified] : Status post mastectomy and implant reconstruction with Wise pattern approach [de-identified] : Status post mastectomy and implant reconstruction with Wise pattern approach and later postmastectomy radiation.  Improved redness over upper aspect of the left implant with resolution of her previous medial breast density [de-identified] : Soft slightly enlarged right axillary lymph node [de-identified] : Stable upper extremity mild lymphedema

## 2023-05-04 NOTE — ASSESSMENT
[FreeTextEntry1] : The patient is a 47-year-old  still premenopausal white female of Lao, English, Sweedish, Kuwaiti, Darlin, Arabic, and  descent.  She underwent menarche at age 12 and had her first child at age 32.  She has a strong family history of breast and ovarian cancer with her maternal great aunt who had ovarian cancer who passed away.  She has a maternal second cousin who had breast cancer in her late 40s.  Her paternal grandfather passed away from colorectal cancer at age 65.  Her maternal grandfather had colon cancer around age 75.  She has 4 maternal great uncles who had esophageal and throat cancer and a paternal great aunt who had uterine/cervical cancer.  A paternal great uncle had lung cancer.  The patient underwent bilateral augmentation implants in  requiring multiple replacements for ruptures last exchanged in .  She noticed a density towards the lateral aspect of the left breast and mammography showed suspicious left retroareolar calcifications.  Ultrasound showed multiple suspicious areas in the left breast 2:00, 3:00, as well as a suspicious left axillary lymph node.  Ultrasound-guided core biopsies on 2017 showed the left retroareolar density to be an intermediate grade invasive duct cancer with surrounding DCIS which was ER positive at 80%, GA negative, and HER-2 3+ by IHC.  A separate left breast 2:00 density 5 cm from the nipple turned out to be metastatic cancer in an intramammary lymph node which was ER/GA positive and HER-2 positive.  The left axillary suspicious lymph node was also positive.  She had clips placed in all the biopsied lesions as well as in a separate left breast 2:00 and 9:00 density which turned out to be a fibroadenoma and fibrocystic change.  An MRI performed on 2017 showed the right breast to be negative but the left breast showed extensive enhancement and PET scan showed no distant disease.  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  She underwent neoadjuvant TCHP chemotherapy through Dr. Pacheco.  MRI after neoadjuvant chemotherapy on 2018 showed no further enhancement.  I then performed bilateral total mastectomies through a Wise reduction pattern incision on April 10, 2018 and she had bilateral expander reconstructions with AlloDerm.  She had a sentinel lymph node biopsy with localization of the nodes preoperatively and both the intramammary as well as axillary lymph nodes were all negative.  She ended up with another 12 nodes removed which were negative since the localization was not perfectly placed.  The left mastectomy showed no residual cancer and the right mastectomy was negative.  She did undergo a small wound revision of the left breast by Dr. Nava on May 23, 2018.  She is following up with Dr. Pacheco and finished Herceptin and Perjeta and underwent postmastectomy radiation therapy at [facility] which finished on 2018.  She developed some redness in the left breast and was admitted to [place] and placed on IV vancomycin for 3 days and the redness resolved.  She developed bilateral upper extremity lymphedema for which she wears compression sleeves.  She developed redness over the left implant and was placed on Levaquin in 2018 and the redness improved.  She again was placed on Cipro in 2019 for some cellulitis over the right implant.  She underwent fat grafting and removal of her port in 2019 and then had her implants removed and exchanged by Dr. Ye in 2019 for cosmesis.  She now follows up with Dr. Philippe since Dr. Holly Walker has left and she is continuing on Zoladex as well as Aromasin and received neratinib which finished in May 2021.  She did have some neuropathy and follows up with Dr. Stephen.  On exam, I cannot feel any suspicious densities over either implant.  She has stable bilateral arm lymphedema.  There was a small density felt over the upper inner aspect of the left implant and I sent her for directed left breast ultrasound which was performed on 3/22/2021 showing no suspicious findings in that area.  Her redness has since improved and the density is no longer palpable.  She did have this soft palpable lymph node in the upper right axillary region but directed axillary ultrasound on May 7, 2021 only showed normal axillary lymph nodes.  She did have her 2nd COVID vaccination in 2021.  The patient then underwent a bilateral breast MRI ordered by medical oncology on 2021 which showed some enhancement in the upper inner aspect of the right breast and she was sent for targeted mammography and ultrasound of the right breast on 2021 just showing some developing fat necrosis likely from her fat injections in the past.  A follow-up diagnostic MRI and right breast mammography was performed on 2022 and the mammography just showed a "coil" clip in the right breast around a previously biopsied benign density and the MRI showed no suspicious findings.  She also had a follow-up diagnostic right breast ultrasound on 2022 performed here [facility] just showing this right breast 2:00 previously biopsied area of fat necrosis to have decreased in size.  Her last bilateral breast MRI was performed on 2023 which was reviewed from [facility] which showed no suspicious areas of enhancement.  Otherwise, she should remain on Zoladex and Letrozole, through Dr. Philippe.  I would like to see her again in 1 year in 2024 and she no longer requires routine diagnostic imaging given her bilateral mastectomies.

## 2023-05-04 NOTE — REASON FOR VISIT
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  The cancer was ER positive, MN negative, and HER-2 3+ and she underwent neoadjuvant TCHP chemotherapy through Dr. Pacheco.  She had bilateral total mastectomies through a Wise reduction pattern on April 10, 2018 and had bilateral expander reconstructions with AlloDerm.  Her nodes were all negative after neoadjuvant chemotherapy.  She finished Herceptin and Perjeta and had postmastectomy radiation and did develop some bouts of cellulitis and bilateral arm lymphedema.  She had her expanders exchanged for implants in 2019 and is currently on Zoladex, Aromasin, and is on neratinib and comes in for an interval follow-up with a questionable palpable right axillary lymph node felt by her medical oncologist.

## 2023-05-04 NOTE — HISTORY OF PRESENT ILLNESS
[FreeTextEntry1] : The patient is a 47-year-old  still premenopausal white female of Slovak, English, Sweedish, Ivorian, Darlin, Turkmen, and  descent.  She underwent menarche at age 12 and had her first child at age 32.  She has a strong family history of breast and ovarian cancer with her maternal great aunt who had ovarian cancer who passed away.  She has a maternal second cousin who had breast cancer in her late 40s.  Her paternal grandfather passed away from colorectal cancer at age 65.  Her maternal grandfather had colon cancer around age 75.  She has 4 maternal great uncles who had esophageal and throat cancer and a paternal great aunt who had uterine/cervical cancer.  A paternal great uncle had lung cancer.  The patient underwent bilateral augmentation implants in  requiring multiple replacements for ruptures last exchanged in .  She noticed a density towards the lateral aspect of the left breast and mammography showed suspicious left retroareolar calcifications.  Ultrasound showed multiple suspicious areas in the left breast 2:00, 3:00, as well as a suspicious left axillary lymph node.  Ultrasound-guided core biopsies on 2017 showed the left retroareolar density to be an intermediate grade invasive duct cancer with surrounding DCIS which was ER positive at 80%, NJ negative, and HER-2 3+ by IHC.  A separate left breast 2:00 density 5 cm from the nipple turned out to be metastatic cancer in an intramammary lymph node which was ER/NJ positive and HER-2 positive.  The left axillary suspicious lymph node was also positive.  She had clips placed on all the biopsied lesions as well as in a separate left breast 2:00 and 9:00 density which turned out to be a fibroadenoma and fibrocystic change.  An MRI performed on 2017 showed the right breast to be negative but the left breast showed extensive enhancement and PET scan showed no distant disease.  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  She underwent neoadjuvant TCHP chemotherapy through Dr. Pacheco.  MRI after neoadjuvant chemotherapy on 2018 showed no further enhancement.  I then performed bilateral total mastectomies through a Wise reduction pattern incision on April 10, 2018 and she had bilateral expander reconstructions with AlloDerm.  She had a sentinel lymph node biopsy with localization of the nodes preoperatively and both the intramammary as well as axillary lymph nodes were all negative.  She ended up with another 12 nodes removed which were negative since the localization was not perfectly placed.  The left mastectomy showed no residual cancer and the right mastectomy was negative.  She did undergo a small wound revision of the left breast by Dr. Nava on May 23, 2018.  She is following up with Dr. Pacheco and finished Herceptin and Perjeta and underwent postmastectomy radiation therapy at [facility] which finished on 2018.  She developed some redness in the left breast and was admitted to [facility] and placed on IV vancomycin for 3 days and the redness resolved.  She developed bilateral upper extremity lymphedema for which she wears compression sleeves.  She developed redness over the left implant and was placed on Levaquin in 2018 and the redness improved.  She again was placed on Cipro in 2019 for some cellulitis over the right implant.  She underwent fat grafting and removal of her port in 2019 and then had her implants removed and exchanged by Dr. Ye in 2019 for cosmesis.  She now follows up with Dr. Philippe since Dr. Holly Walker left and she is continuing on Zoladex as well as Aromasin and finished neratinib in May 2021.  She did have some neuropathy and follows up with Dr. Stephen.  She had a questionable lymph node felt in her lower right axilla in May 2021 but directed ultrasound just showed normal lymph nodes.  MRI on 2021 showed some enhancement in the upper inner aspect of the right breast but mammography and ultrasound on 2021 and follow-up MRI in 2022 just showed some areas of fat necrosis.  She comes in now for routine follow-up.

## 2023-05-09 ENCOUNTER — RESULT REVIEW (OUTPATIENT)
Age: 48
End: 2023-05-09

## 2023-05-10 ENCOUNTER — RESULT REVIEW (OUTPATIENT)
Age: 48
End: 2023-05-10

## 2023-05-10 ENCOUNTER — APPOINTMENT (OUTPATIENT)
Dept: HEMATOLOGY ONCOLOGY | Facility: CLINIC | Age: 48
End: 2023-05-10
Payer: COMMERCIAL

## 2023-05-10 VITALS
DIASTOLIC BLOOD PRESSURE: 82 MMHG | OXYGEN SATURATION: 98 % | SYSTOLIC BLOOD PRESSURE: 151 MMHG | HEART RATE: 97 BPM | WEIGHT: 209 LBS | HEIGHT: 63 IN | TEMPERATURE: 98.7 F | BODY MASS INDEX: 37.03 KG/M2 | RESPIRATION RATE: 18 BRPM

## 2023-05-10 PROCEDURE — 99214 OFFICE O/P EST MOD 30 MIN: CPT

## 2023-05-12 NOTE — HISTORY OF PRESENT ILLNESS
[de-identified] : Ms Jonas is a 47 year old woman who was diagnosed with a pT1cN1 stage 2a , ER + VA+ HER 2 nicole positive breast cancer in October 2017, after self palpating a lump in the left. She had breast imaging at [facility] in [place]. Left breast biopsy was done at [facility] [place] [facility].\par \par She had neoadjuvant chemotherapy with Dr Pacheco, 11/2017 TCHP had pCR  followed by a year completion of HP.  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 On b12 shots .  Bilateral mastectomies with reconstruction with Askikari at [facility] which revealed no evidence of residual disease. She underwent post-operative radiation therapy with Dr Purdy at [facility].  She underwent menopause during chemotherapy, and then menses resumed 2019.  She then started ovarian suppression with Zoladex in March of 2019. She  then began exemestane in 2019.  \par Hx of chest pain - [facility] \par \par Began Nerlynx in January of 2020 .  + diarrhea, intermittent toe infections.  She had several breaks in Nerlyx treatments because of infection and respiratory illness.  Tested negative for COVID. Total of 13 weeks of breaks from Nerlynx.  She believes due to complete late April 2021. currently on 4 pills a day \par \par There have been several post operative infections and complication requiring multiple reconstructive surgeries.  Chronic lymphedema of both arms, also has had several episodes of bilateral chest wall cellullitis.\par hx of uti on spressive nitrofuriton.  hx of high bp \par \par She is here to transfer care. \par \par Sees rheumatology. a lot of joint pains. \par \par PETCT negative for any mets or lesions jan 2021 \par \par saw Dr. Combs- did R axilla US due to palpable lymph node ( was not sure if this was r/t J&J shot)\par \par less gi symptoms now off nerlynx( may 2021)  \par \par mri breast mammo 1/22/22 [de-identified] : Patient seen and examined today for follow up for the management of history of breast cancer. She remains on Letrozole and Zoladex. Zoldex due today.  She continues to follow with neuro, PCP and Dr. Combs. Up to date with Dr. Combs - reviewed note May 4, 2023. She reports feeling overall the same since her last visit. She continues to experience joint pains diffusely and declines switching AIs. She notes at most times she is in 6/10 pain that she tolerates. She endorses one day of HA to occipital area and around eye without other associated neuro s/s. She also endorses persistent hair loss. She has not tried any over the counter supplementation. At previous visit she was noted to have pain and associated rash to mid back s/p Valtrex with resolution to back rash. She denies any new rashes. She has not gone to PT.\par \par May 4, 2023 MRI abdomen ( [facility])- no suspicious liver lesions. several scattered T2 hyperintense foci representing cysts and hemangiomas. largest 9mm. \par \par MRI Breast:1/2023 BIRADS 2 \par DEXA: 3/2022 normal bone density \par CT CAP: 12/2022 subcentimeter liver lesions\par MRI L spine: 12/2022 L2-3 disc protrusion\par GYN: She continues to follow with Dr. Delio Jean Baptiste. Has had frequent UTIs on nitrofurantoin. She took a break from nitrofurantoin but started again per instruction of UroGYN.\par Colonoscopy: Scheduled with Dr. Verdugo 5/24/23 \par PCP: Dr. Barrios and has upcoming follow up \par Neurologist: Dr. Lynn and has upcoming follow up \par rheum- follow up due. \par \par left leg cramping. then moves to R leg.

## 2023-05-12 NOTE — REVIEW OF SYSTEMS
[Chills] : chills [Fatigue] : fatigue [Recent Change In Weight] : ~T recent weight change [Joint Pain] : joint pain [Joint Stiffness] : joint stiffness [Muscle Pain] : muscle pain [Anxiety] : anxiety [Hot Flashes] : hot flashes [Negative] : Allergic/Immunologic [Fever] : no fever [Night Sweats] : no night sweats [Palpitations] : no palpitations [Shortness Of Breath] : no shortness of breath [Wheezing] : no wheezing [Cough] : no cough [SOB on Exertion] : no shortness of breath during exertion [Abdominal Pain] : no abdominal pain [Diarrhea: Grade 1 - Increase of <4 stools per day over baseline; mild increase in ostomy output compared to baseline] : Diarrhea: Grade 1 - Increase of <4 stools per day over baseline; mild increase in ostomy output compared to baseline [Skin Rash] : no skin rash [FreeTextEntry2] : +gained 3lbs since last visit  [FreeTextEntry3] : +follows with optho  [FreeTextEntry5] : not actively having palpitations  [FreeTextEntry7] : +persistent diarrhea in the mornings takes Imodium PRN, seeing GI Dr. Verdugo for CNY  [FreeTextEntry8] : +on ppx nitrofurantoin per UroGYN Dr. Kebede [FreeTextEntry9] : LLE pain. spasms more than RLE  [de-identified] : +hair thinning- not worse

## 2023-05-12 NOTE — PHYSICAL EXAM
[Fully active, able to carry on all pre-disease performance without restriction] : Status 0 - Fully active, able to carry on all pre-disease performance without restriction [Normal] : affect appropriate [de-identified] : no supraclavicular or infraclavicular adenopathy  [de-identified] : b/l mastectomy with silicone implants symmetrical, no obvious masses or lesions, no skin dimpling, no axillary adenopathy [de-identified] : mild point tenderness upon palpation to LLE , pronounced to calf.

## 2023-05-12 NOTE — ASSESSMENT
[FreeTextEntry1] : Ms. Jonas is a 47 year old female with a history of left HER2+/ER+ pT1cN1 stage 2a breast cancer diagnosed 10/2017 s/p neoadjuvant TCHP followed by b/l mastectomy with node dissection, negative for residual disease, pCR. She is s/p 12 additional doses of Herceptin/Perjeta. She is s/p radiation. She started Nerlynx in 1/2020. She also started on Zoladex and Exemestane in 2019.  Now on Letrozole 1/2021. Completed Nerlynx 5/2021. \par \par #Breast Cancer \par - s/p neoadjuvant TCHP followed by b/l mastectomy with node dissection negative for residual disease, pCR\par - s/p 12 doses of Herceptin/Perjeta\par - PET/CT 1/2021 negative for any evidence of metastases \par - Completed 1 year of Nerlynx 5/2021 \par - s/p bx in 11/2021 revealing fat necrosis \par - Started AI 2019 and switched to Letrozole with monthly Zoladex. She continues to experience persistent stable joint pains diffusely. Patient declines switching to another AI. \par - s/p CT CAP 12/16/22 no evidence of malignancy with subcentimeter liver lesions. Follow up MRI Abd 5/2023. \par - s/p MRI breast 1/2023 BIRADS 2 \par - Continue follow up with Dr. Combs, reviewed note may 4, 2023. \par \par #Back pain \par - XR no suspicious lytic or blastic lesions\par - s/p neuro and PCP eval\par - MRI L spine 12/7/22 revealing disc protrusion \par - ALKP remains within normal limits \par - Continue follow up with Neuro \par \par #Lymphedema \par - Re-advised will benefit from PT\par - Patient has not gone \par \par #Bone Density  \par - DEXA 3/2022 L spine T score -0.9, L femoral neck T score -0.2, L femur T score -0.3. Normal bone density. \par - Continue vitamin D and weight bearing exercises \par \par #Vit D def \par - Cont vit d \par - Continue to monitor levels \par \par #Joint pains \par - 2/2 AI or Zoladex. Patient declines switching AI. \par - Continue Letrozole \par - Continue follow up with Rheum/Neuro/PCP \par \par #Fatigue \par - Likely 2/2 lingering s/s of recent viral symptoms and/or AI\par - Continue to monitor iron studies, B12/folate, TFTs\par \par #Health Maintenance\par - UroGYN: Dr. Kebede continues on ppx nitrofurantoin\par - CNY scheduled 5/2023 with Dr. Verdugo\par - PCP Dr. Barrios. Continue follow up as instructed. \par - Neuro Dr. Cox. Continue follow up as instructed. \par \par #Abd Pain/Liver Lesions \par - s/p CT CAP 12/2022 negative. Noted subcentimeter liver lesions. \par - Follow up MRI abd for assessment of liver lesions 5/2023 shows 9mm cyst and 2 hemangiomas .Otherwise, no liver lesions. LFT's/ ALK Phos  normal. \par -May 4, 2023 MRI abdomen ( [facility])- no suspicious liver lesions. several scattered T2 hyperintense foci representing cysts and hemangiomas. largest 9mm. consider repeat in 1 year to ensure stability. \par \par #Intermittent HA \par - Endorses intermittent HA occipital and around eye\par - No associated neuro s/s\par - If persistent or worsens can consider MRI brain \par - Continue follow up with Neuro \par \par #Diarrhea \par - Worse in morning \par - Patient attributes to Letrozole use \par - Imodium PRN \par - BRAT diet \par - Advised importance of CNY. Scheduled with Dr. Verdugo 5/2023. \par \par #Hair thinning\par - 2/2 AI use with Letrozole \par - Offered Minoxidil 2.5mg PO. Patient declines. \par \par # LLE pain/spasm/cramping\par STAT venous duplex LLE negative for DVT \par electrolytes WNL \par consider tonic water. \par \par RTC in 1 month with next monthly Zoladex with CBC with diff, CMP, UA

## 2023-05-24 ENCOUNTER — LABORATORY RESULT (OUTPATIENT)
Age: 48
End: 2023-05-24

## 2023-05-24 ENCOUNTER — NON-APPOINTMENT (OUTPATIENT)
Age: 48
End: 2023-05-24

## 2023-05-24 ENCOUNTER — APPOINTMENT (OUTPATIENT)
Dept: GASTROENTEROLOGY | Facility: CLINIC | Age: 48
End: 2023-05-24
Payer: COMMERCIAL

## 2023-05-24 VITALS
OXYGEN SATURATION: 97 % | WEIGHT: 206 LBS | DIASTOLIC BLOOD PRESSURE: 89 MMHG | HEART RATE: 96 BPM | HEIGHT: 63 IN | SYSTOLIC BLOOD PRESSURE: 139 MMHG | BODY MASS INDEX: 36.5 KG/M2 | RESPIRATION RATE: 16 BRPM

## 2023-05-24 DIAGNOSIS — Z12.11 ENCOUNTER FOR SCREENING FOR MALIGNANT NEOPLASM OF COLON: ICD-10-CM

## 2023-05-24 PROCEDURE — 99204 OFFICE O/P NEW MOD 45 MIN: CPT

## 2023-05-24 NOTE — CONSULT LETTER
[Dear  ___] : Dear ~ANDREW, [Consult Letter:] : I had the pleasure of evaluating your patient, [unfilled]. [Please see my note below.] : Please see my note below. [Consult Closing:] : Thank you very much for allowing me to participate in the care of this patient.  If you have any questions, please do not hesitate to contact me. [Sincerely,] : Sincerely, [FreeTextEntry3] : Karlee Perry M.D.\par

## 2023-05-24 NOTE — ASSESSMENT
[FreeTextEntry1] : 46 yo F history of breast cancer 2017 , rheumatoid arthritis,  referred for screening colonoscopy. \par Pt c/o chronic diarrhea for year\par \par - labs for celiac\par - stool sample \par - schedule colonoscopy\par \par Will plan on a colonoscopy for screening, (BIOPSIES FOR DIARRHEA).\par Explained the risks (including but not limited to cardiopulmonary / anesthesia complications, bleeding, infection, perforation, aspiration, missed lesions, internal organ injury), benefits, and alternatives. Preparation for the procedure was reviewed with the patient. The patient was informed that she/he would be given IV anesthesia by an anesthesiologist during the procedure. The patient was informed that a family member or friend must drive the patient following recovery from the procedure. The patient understands and agrees, all questions answered. Will use a suprep  bowel prep with clears the day prior. \par \par \par \par

## 2023-05-24 NOTE — HISTORY OF PRESENT ILLNESS
[FreeTextEntry1] : 46 yo F history of breast cancer 2017 , rheumatoid arthritis,  referred for screening colonoscopy. \par \par Pt reports about 5 years of diarrhea- occurred with her treatments, chemo/ radiation. \par She attributes this to letrozole and nitrofurontin (for UTIs). \par Reports 5 loose BMs in the morning, then a few times throughout the day. \par Takes imodium PRN\par \par Last colonoscopy:;none prior\par \par Soc: no tobacco or significant EtOH\par \par Family Hx:  maternal uncle -crohns\par maternal grandfather- colon cancer\par \par ROS:\par constitutional: no weight loss, fevers\par ENT: no deafness\par Eyes: no blindness\par Neck: no lymphadenopathy\par Chest: no shortness of breath, no cough\par Cardiac: no chest pain, no palpitations\par Vascular: no leg swelling\par GI: no abdominal pain, nausea, vomiting, diarrhea, constipation, rectal bleeding, melena, dysphagia,  unless otherwise noted in HPI\par : no dysuria, dark urine\par Skin: no rashes, lesions, jaundice\par Heme: no bleeding\par Endocrine: no DM  unless otherwise stated in HPI\par \par Physical Exam: (VS noted below)\par General: alert, comfortable, in no acute distress\par Eyes: normal sclera, anicteric\par Neck: normal, supple, no neck mass\par Pulm: no respiratory distress, clear to auscultation bilaterally \par Heart: RRR, normal S1 S2\par Abd: Soft, non-tender, non-distended, normal bowel sounds, no appreciable hepatosplenomegaly, no masses palpated\par Rectal: deferred\par Ext: warm and well perfused, no edema\par Skin: no rashes, no juandice\par Neuro: alert, grossly nonfocal\par \par Labs/imaging/prior endoscopic results were reviewed to the extent available and pertinent findings noted in HPI\par

## 2023-05-30 LAB
CELIACPAN: NORMAL
IGA SER QL IEP: 151 MG/DL

## 2023-06-06 ENCOUNTER — RESULT REVIEW (OUTPATIENT)
Age: 48
End: 2023-06-06

## 2023-06-06 ENCOUNTER — APPOINTMENT (OUTPATIENT)
Dept: HEMATOLOGY ONCOLOGY | Facility: CLINIC | Age: 48
End: 2023-06-06
Payer: COMMERCIAL

## 2023-06-06 VITALS
SYSTOLIC BLOOD PRESSURE: 123 MMHG | RESPIRATION RATE: 18 BRPM | DIASTOLIC BLOOD PRESSURE: 77 MMHG | TEMPERATURE: 98.9 F | WEIGHT: 208 LBS | BODY MASS INDEX: 36.86 KG/M2 | OXYGEN SATURATION: 96 % | HEART RATE: 82 BPM | HEIGHT: 63 IN

## 2023-06-06 DIAGNOSIS — M79.89 OTHER SPECIFIED SOFT TISSUE DISORDERS: ICD-10-CM

## 2023-06-06 PROCEDURE — 99214 OFFICE O/P EST MOD 30 MIN: CPT

## 2023-06-06 NOTE — REVIEW OF SYSTEMS
[Chills] : chills [Fatigue] : fatigue [Recent Change In Weight] : ~T recent weight change [Diarrhea: Grade 1 - Increase of <4 stools per day over baseline; mild increase in ostomy output compared to baseline] : Diarrhea: Grade 1 - Increase of <4 stools per day over baseline; mild increase in ostomy output compared to baseline [Joint Pain] : joint pain [Joint Stiffness] : joint stiffness [Muscle Pain] : muscle pain [Anxiety] : anxiety [Hot Flashes] : hot flashes [Negative] : Allergic/Immunologic [Fever] : no fever [Night Sweats] : no night sweats [Palpitations] : no palpitations [Shortness Of Breath] : no shortness of breath [Wheezing] : no wheezing [Cough] : no cough [SOB on Exertion] : no shortness of breath during exertion [Abdominal Pain] : no abdominal pain [Skin Rash] : no skin rash [FreeTextEntry2] : +gained 3lbs since last visit  [FreeTextEntry3] : +follows with optho  [FreeTextEntry5] : not actively having palpitations  [FreeTextEntry7] : +persistent diarrhea in the mornings takes Imodium PRN, seeing GI Dr. Verdugo for CNY  [FreeTextEntry8] : +on ppx nitrofurantoin per UroGYN Dr. Kebede [FreeTextEntry9] : LLE pain. spasms more than RLE  [de-identified] : +hair thinning- not worse

## 2023-06-06 NOTE — PHYSICAL EXAM
[Fully active, able to carry on all pre-disease performance without restriction] : Status 0 - Fully active, able to carry on all pre-disease performance without restriction [Normal] : affect appropriate [de-identified] : no supraclavicular or infraclavicular adenopathy, no cervical adenopathy  [de-identified] : b/l mastectomy with silicone implants symmetrical, no obvious masses or lesions, no skin dimpling, no axillary adenopathy

## 2023-06-06 NOTE — HISTORY OF PRESENT ILLNESS
[de-identified] : Ms Jonas is a 47 year old woman who was diagnosed with a pT1cN1 stage 2a , ER + MT+ HER 2 nicole positive breast cancer in October 2017, after self palpating a lump in the left. She had breast imaging at [facility] in [place]. Left breast biopsy was done at [facility] in [facility].\par \par She had neoadjuvant chemotherapy with Dr Pacheco, 11/2017 TCHP had pCR  followed by a year completion of HP.  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 On b12 shots .  Bilateral mastectomies with reconstruction with Askikari at [facility] which revealed no evidence of residual disease. She underwent post-operative radiation therapy with Dr Purdy at [facility].  She underwent menopause during chemotherapy, and then menses resumed 2019.  She then started ovarian suppression with Zoladex in March of 2019. She  then began exemestane in 2019.  \par Hx of chest pain - [facility] \par \par Began Nerlynx in January of 2020 .  + diarrhea, intermittent toe infections.  She had several breaks in Nerlyx treatments because of infection and respiratory illness.  Tested negative for COVID. Total of 13 weeks of breaks from Nerlynx.  She believes due to complete late April 2021. currently on 4 pills a day \par \par There have been several post operative infections and complication requiring multiple reconstructive surgeries.  Chronic lymphedema of both arms, also has had several episodes of bilateral chest wall cellullitis.\par hx of uti on spressive nitrofuriton.  hx of high bp \par \par She is here to transfer care. \par \par Sees rheumatology. a lot of joint pains. \par \par PETCT negative for any mets or lesions jan 2021 \par \par saw Dr. Combs- did R axilla US due to palpable lymph node ( was not sure if this was r/t J&J shot)\par \par less gi symptoms now off nerlynx( may 2021)  \par \par mri breast mammo 1/22/22 [de-identified] : Patient seen and examined today for follow up for the management of history of breast cancer. She remains on Letrozole and Zoladex. Zoladex due today.  She continues to follow with neuro, PCP and Dr. Combs. She was last seen by Dr. Combs 5/2023, no need for breast imaging. She continues to note joint pains that are not worse and does not wish to switch AIs. She endorses she has been having rhinorrhea associated with allergies and has been using Nasocort and occasionally antihistamine oral pill. Continues to note hair thinning and declines intervention with Minoxidil. She is currently still on ppx nitrofurantoin for recurrent UTI s/s. She recently weened herself off and had been taking prn but restarted to take daily. She is in process of scheduling follow up appt with UroGYN Dr. Carranza. Denies fevers/chills, SOB, cough, CP, palpitations, abd pain, n/v/d, swelling to extremities, hematuria, BRBPR, abnormal vaginal bleeding. She continues to endorse weight gain despite diet. \par \par MRI Abd: no suspicious liver lesions. several scattered T2 hyperintense foci representing cysts and hemangiomas. largest 9mm. \par MRI Breast:1/2023 BIRADS 2 \par DEXA: 3/2022 normal bone density \par CT CAP: 12/2022 subcentimeter liver lesions\par MRI L spine: 12/2022 L2-3 disc protrusion\par GYN: Dr. Delio Jean Baptiste remains on daily ppx nitrofurantoin for UTI s/s \par Colonoscopy: s/p eval with Dr. Verdugo 5/24/23 CNY scheduled for 8/2023 \par PCP: Dr. Barrios \par Neurologist: Dr. Lynn and has upcoming follow up \par Rhuem: needs to make appt \par

## 2023-06-06 NOTE — ASSESSMENT
[FreeTextEntry1] : Ms. Jonas is a 47 year old female with a history of left HER2+/ER+ pT1cN1 stage 2a breast cancer diagnosed 10/2017 s/p neoadjuvant TCHP followed by b/l mastectomy with node dissection, negative for residual disease, pCR. She is s/p 12 additional doses of Herceptin/Perjeta. She is s/p radiation. She started Nerlynx in 1/2020. She also started on Zoladex and Exemestane in 2019.  Now on Letrozole 1/2021. Completed Nerlynx 5/2021. \par \par #Breast Cancer \par - s/p neoadjuvant TCHP followed by b/l mastectomy with node dissection negative for residual disease, pCR\par - s/p 12 doses of Herceptin/Perjeta\par - PET/CT 1/2021 negative for any evidence of metastases \par - Completed 1 year of Nerlynx 5/2021 \par - s/p bx in 11/2021 revealing fat necrosis \par - Started AI 2019 and switched to Letrozole with monthly Zoladex. She continues to experience persistent stable joint pains diffusely. Patient declines switching to another AI. \par - s/p CT CAP 12/16/22 no evidence of malignancy with subcentimeter liver lesions\par - s/p MRI breast 1/2023 BIRADS 2 \par - s/p MRI Abd 5/2023 no suspicious liver lesions. several scattered T2 hyperintense foci representing cysts and hemangiomas. Largest 9mm. Reviewed. \par - 6/6/23 vs and CBC reviewed; WBC 4.84, hgb 13.8, plt 245. Stable complaints on Letrozole. Continues to decline switching AI. Continue Letrozole and monthly Zoladex. Due for Zoladex today; proceed. Continue follow up with Dr. Combs, last seen 5/2023, note reviewed. No need for breast imaging. Continue clinical breast exams. \par \par #Back pain \par - XR no suspicious lytic or blastic lesions\par - s/p neuro and PCP eval\par - MRI L spine 12/7/22 revealing disc protrusion \par - Continue to monitor Alk Phos \par - 6/6/23 Alk Phos wnl. Continue follow up with Neuro as instructed. Patient notes she was offered medication by neurology and declines starting it. \par \par #Lymphedema \par - Re-advised will benefit from PT\par - Patient has not gone \par \par #Bone Density  \par - DEXA 3/2022 L spine T score -0.9, L femoral neck T score -0.2, L femur T score -0.3. Normal bone density. \par - Continue vitamin D and weight bearing exercises \par \par #Joint pains \par - 2/2 AI or Zoladex. Patient declines switching AI. \par - Continue Letrozole \par - Continue follow up with Rheum/Neuro/PCP \par - 6/6/23 stable. Patient does not wish to switch AI or introduce other medications.\par \par #Fatigue \par - Likely 2/2 lingering s/s of recent viral symptoms and/or AI\par - Continue to monitor iron studies, B12/folate, TFTs \par \par #Health Maintenance\par - UroGYN: Dr. Kebede continues on ppx nitrofurantoin\par - CNY scheduled 5/2023 with Dr. Verdugo\par - PCP Dr. Barrios. Continue follow up as instructed. \par - Neuro Dr. Cox. Continue follow up as instructed. \par \par #Abd Pain/Liver Lesions \par - s/p CT CAP 12/2022 negative. Noted subcentimeter liver lesions. \par - Follow up MRI abd for assessment of liver lesions 5/2023 shows 9mm cyst and 2 hemangiomas .Otherwise, no liver lesions. LFT's/ ALK Phos  normal. \par -May 4, 2023 MRI abdomen ( [facility])- no suspicious liver lesions. several scattered T2 hyperintense foci representing cysts and hemangiomas. largest 9mm. consider repeat in 1 year to ensure stability. \par \par #Intermittent HA \par - Endorses intermittent HA occipital and around eye\par - No associated neuro s/s\par - If persistent or worsens can consider MRI brain \par - Continue follow up with Neuro \par \par #Diarrhea \par - Worse in morning \par - Patient attributes to Letrozole use \par - Imodium PRN \par - BRAT diet \par - s/p eval with Dr. Verdugo 5/24/23, note reviewed. Scheduled for CNY 8/2023. Celiac testing completed.\par \par #Hair thinning\par - 2/2 AI use with Letrozole \par - Offered Minoxidil 2.5mg PO. Patient declines. \par \par #LLE pain/spasm/cramping\par - s/p STAT venous duplex LLE 5/10/23 negative for DVT \par - 6/6/23 improved. Encouraged adequate hydration. Can consider Magnesium supplementation for leg cramping. Advised patient to report any new or worsening s/s \par \par #Weight Gain \par - 6/6/23 likely 2/2 AI use. Recommend nutritionist. Patient declines currently. \par \par RTC in 1 month with next monthly Zoladex with CBC with diff, CMP, UA, irons, b12/folate, vit D, TSH, FT4

## 2023-06-07 ENCOUNTER — APPOINTMENT (OUTPATIENT)
Dept: HEMATOLOGY ONCOLOGY | Facility: CLINIC | Age: 48
End: 2023-06-07

## 2023-07-11 ENCOUNTER — APPOINTMENT (OUTPATIENT)
Dept: HEMATOLOGY ONCOLOGY | Facility: CLINIC | Age: 48
End: 2023-07-11
Payer: COMMERCIAL

## 2023-07-11 VITALS
WEIGHT: 210 LBS | OXYGEN SATURATION: 98 % | RESPIRATION RATE: 18 BRPM | BODY MASS INDEX: 37.21 KG/M2 | TEMPERATURE: 99 F | SYSTOLIC BLOOD PRESSURE: 158 MMHG | HEIGHT: 63 IN | HEART RATE: 90 BPM | DIASTOLIC BLOOD PRESSURE: 101 MMHG

## 2023-07-11 DIAGNOSIS — S93.106A UNSPECIFIED DISLOCATION OF UNSPECIFIED TOE(S), INITIAL ENCOUNTER: ICD-10-CM

## 2023-07-11 DIAGNOSIS — R39.9 UNSPECIFIED SYMPTOMS AND SIGNS INVOLVING THE GENITOURINARY SYSTEM: ICD-10-CM

## 2023-07-11 PROCEDURE — 99214 OFFICE O/P EST MOD 30 MIN: CPT

## 2023-07-16 PROBLEM — S93.106A TOE DISLOCATION: Status: ACTIVE | Noted: 2023-07-16

## 2023-07-16 PROBLEM — R39.9 UTI SYMPTOMS: Status: ACTIVE | Noted: 2022-04-07

## 2023-07-16 NOTE — ASSESSMENT
[FreeTextEntry1] : Ms. Jonas is a 47 year old female with a history of left HER2+/ER+ pT1cN1 stage 2a breast cancer diagnosed 10/2017 s/p neoadjuvant TCHP followed by b/l mastectomy with node dissection, negative for residual disease, pCR. She is s/p 12 additional doses of Herceptin/Perjeta. She is s/p radiation. She started Nerlynx in 1/2020. She also started on Zoladex and Exemestane in 2019.  Now on Letrozole 1/2021. Completed Nerlynx 5/2021. \par \par #Breast Cancer \par - s/p neoadjuvant TCHP followed by b/l mastectomy with node dissection negative for residual disease, pCR\par - s/p 12 doses of Herceptin/Perjeta\par - PET/CT 1/2021 negative for any evidence of metastases \par - Completed 1 year of Nerlynx 5/2021 \par - s/p bx in 11/2021 revealing fat necrosis \par - Started AI 2019 and switched to Letrozole with monthly Zoladex. She continues to experience persistent stable joint pains diffusely. Patient declines switching to another AI. \par - s/p CT CAP 12/16/22 no evidence of malignancy with subcentimeter liver lesions\par - s/p MRI breast 1/2023 BIRADS 2 \par - s/p MRI Abd 5/2023 no suspicious liver lesions. several scattered T2 hyperintense foci representing cysts and hemangiomas. Largest 9mm. Reviewed. \par - 6/6/23 vs and CBC reviewed; WBC 4.84, hgb 13.8, plt 245. Stable complaints on Letrozole. Continues to decline switching AI. Continue Letrozole and monthly Zoladex. Due for Zoladex today; proceed. Continue follow up with Dr. Combs, last seen 5/2023, note reviewed. No need for breast imaging. Continue clinical breast exams. \par - 7/11/23 vs and CBC reviewed; WBC 5.46, hgb 14.1, plt 284. Continue Letrozole and Zoladex. Due for Zoladex today. Patient presents with similar complaints of joint pains, hair loss stable and does not wish to switch AIs or take a break. \par \par #Sore Throat \par - 7/11/23 s/p eval at Urgent Care for sore throat. COVID, Flu Strep negative. Started on Zpack 1 week ago. Reports sore throat improving but feels as though throat is swollen. Poor visuzalization on PE today due to mallampati score of 4. No erythema to oral mucosa. No stridor or difficulty with breathing or talking. Recommend completion of Zpack and follow up with PCP Dr. Barrios if s/s do not resolve. \par \par #L Pinky Toe Dislocation \par - 7/11/23 s/p eval with podiatry Dr. Romero, was wearing boot for 2 weeks and now taped toe\par \par #Back pain \par - XR no suspicious lytic or blastic lesions\par - s/p neuro and PCP eval\par - MRI L spine 12/7/22 revealing disc protrusion \par - Continue to monitor Alk Phos \par - 6/6/23 Alk Phos wnl. Continue follow up with Neuro as instructed. Patient notes she was offered medication by neurology and declines starting it. \par - 7/11/12 reminded to follow up with neurology \par \par #Lymphedema \par - Re-advised will benefit from PT\par - Patient has not gone \par \par #Bone Density  \par - DEXA 3/2022 L spine T score -0.9, L femoral neck T score -0.2, L femur T score -0.3. Normal bone density. \par - Continue vitamin D and weight bearing exercises \par \par #Joint pains \par - 2/2 AI or Zoladex. Patient declines switching AI. \par - Continue Letrozole \par - Continue follow up with Rheum/Neuro/PCP \par - 6/6/23 stable. Patient does not wish to switch AI or introduce other medications.\par - 7/11/23 stable pains. Again offered to switch AI or take drug holiday. Patient does not wish to. \par \par #Fatigue \par - Likely 2/2 lingering s/s of recent viral symptoms and/or AI\par - Continue to monitor iron studies, B12/folate, TFTs \par \par #Health Maintenance\par - UroGYN: Dr. Kebede continues on ppx nitrofurantoin\par - CNY s/p eval with Dr. Verdugo 5/2023 CNY scheduled 8/2023 \par - PCP Dr. Barrios. Continue follow up as instructed. \par - Neuro Dr. Cox. Continue follow up as instructed. \par \par #Abd Pain/Liver Lesions \par - s/p CT CAP 12/2022 negative. Noted subcentimeter liver lesions. \par - Follow up MRI abd for assessment of liver lesions 5/2023 shows 9mm cyst and 2 hemangiomas .Otherwise, no liver lesions. LFT's/ ALK Phos  normal. \par - 5/4/23 MRI abdomen ( [facility])- no suspicious liver lesions. several scattered T2 hyperintense foci representing cysts and hemangiomas. largest 9mm. consider repeat in 1 year to ensure stability. \par \par #Intermittent HA \par - Endorses intermittent HA occipital and around eye\par - No associated neuro s/s\par - If persistent or worsens can consider MRI brain \par - Continue follow up with Neuro \par \par #Diarrhea \par - Worse in morning \par - Patient attributes to Letrozole use \par - Imodium PRN \par - BRAT diet \par - s/p eval with Dr. Verdugo 5/24/23, note reviewed. Scheduled for CNY 8/2023. Celiac testing completed.\par \par #Hair thinning\par - 2/2 AI use with Letrozole \par - Offered Minoxidil 2.5mg PO. Patient declines. \par \par #LLE pain/spasm/cramping\par - s/p STAT venous duplex LLE 5/10/23 negative for DVT \par - 6/6/23 improved. Encouraged adequate hydration. Can consider Magnesium supplementation for leg cramping. Advised patient to report any new or worsening s/s \par - 7/11/23 resolved. Continue to recommend magnesium supplementation. Did not start. \par \par #Weight Gain \par - 6/6/23 likely 2/2 AI use. Recommend nutritionist. Patient declines currently. \par - 7/11/23 2/2 AI use. Encouraged walking and exercise. Patient does not wish to switch AI. \par \par #Recurrent Infecctions\par - 7/14/23 add immunoglobulins, IgG subsets to next visit \par \par RTC in 1 month with next monthly Zoladex with CBC with diff, CMP, UA, irons, b12/folate, vit D, TSH, FT4, immunoglobulins, IgG subsets\par Discussed with patient spacing out appts for RPA q3mos. Patient wishes to come monthly for RPA with monthly Zoladex injections.\par \par Case and management discussed with Dr. Philippe

## 2023-07-16 NOTE — REVIEW OF SYSTEMS
[Chills] : chills [Fatigue] : fatigue [Recent Change In Weight] : ~T recent weight change [Diarrhea: Grade 1 - Increase of <4 stools per day over baseline; mild increase in ostomy output compared to baseline] : Diarrhea: Grade 1 - Increase of <4 stools per day over baseline; mild increase in ostomy output compared to baseline [Joint Pain] : joint pain [Joint Stiffness] : joint stiffness [Muscle Pain] : muscle pain [Anxiety] : anxiety [Hot Flashes] : hot flashes [Negative] : Allergic/Immunologic [Fever] : no fever [Night Sweats] : no night sweats [Palpitations] : no palpitations [Shortness Of Breath] : no shortness of breath [Wheezing] : no wheezing [Cough] : no cough [SOB on Exertion] : no shortness of breath during exertion [Abdominal Pain] : no abdominal pain [Skin Rash] : no skin rash [FreeTextEntry2] : +gained 2lbs since last visit  [FreeTextEntry3] : +follows with optho  [FreeTextEntry4] : +recent sore throat s/p Z pack, feels as though throat swollen without difficulty breathing or stridor,  [FreeTextEntry7] : +persistent diarrhea in the mornings takes Imodium PRN, seeing GI Dr. Verdugo for CNY 8/2023  [FreeTextEntry8] : +on ppx nitrofurantoin per UroGYN Dr. Kebede [FreeTextEntry9] : recent dislocation of L pinky toe seen by Dr. Romero podiatry  [de-identified] : +hair thinning- not worse

## 2023-07-16 NOTE — PHYSICAL EXAM
[Fully active, able to carry on all pre-disease performance without restriction] : Status 0 - Fully active, able to carry on all pre-disease performance without restriction [Normal] : affect appropriate [de-identified] : oral muscosa without eryhema,  difficult to visualize throat due to tongue and mallampati score 4, no stridor, no difficulty breathing or talking  [de-identified] : no supraclavicular or infraclavicular adenopathy, no cervical adenopathy  [de-identified] : b/l mastectomy with silicone implants symmetrical, no obvious masses or lesions, no skin dimpling, no axillary adenopathy

## 2023-07-16 NOTE — HISTORY OF PRESENT ILLNESS
[de-identified] : Ms Jonas is a 47 year old woman who was diagnosed with a pT1cN1 stage 2a , ER + AL+ HER 2 nicole positive breast cancer in October 2017, after self palpating a lump in the left. She had breast imaging at [facility] in [place]. Left breast biopsy was done at [facility] in [facility].\par \par She had neoadjuvant chemotherapy with Dr Pacheco, 11/2017 TCHP had pCR  followed by a year completion of HP.  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 On b12 shots .  Bilateral mastectomies with reconstruction with Askikari at [facility] which revealed no evidence of residual disease. She underwent post-operative radiation therapy with Dr Purdy at [facility].  She underwent menopause during chemotherapy, and then menses resumed 2019.  She then started ovarian suppression with Zoladex in March of 2019. She  then began exemestane in 2019.  \par Hx of chest pain - [facility] \par \par Began Nerlynx in January of 2020 .  + diarrhea, intermittent toe infections.  She had several breaks in Nerlyx treatments because of infection and respiratory illness.  Tested negative for COVID. Total of 13 weeks of breaks from Nerlynx.  She believes due to complete late April 2021. currently on 4 pills a day \par \par There have been several post operative infections and complication requiring multiple reconstructive surgeries.  Chronic lymphedema of both arms, also has had several episodes of bilateral chest wall cellullitis.\par hx of uti on spressive nitrofuriton.  hx of high bp \par \par She is here to transfer care. \par \par Sees rheumatology. a lot of joint pains. \par \par PETCT negative for any mets or lesions jan 2021 \par \par saw Dr. Combs- did R axilla US due to palpable lymph node ( was not sure if this was r/t J&J shot)\par \par less gi symptoms now off nerlynx( may 2021)  \par \par mri breast mammo 1/22/22 [de-identified] : Patient seen and examined today for follow up for the management of history of breast cancer. She remains on Letrozole and Zoladex. Zoladex due today.  She continues to follow with neuro, PCP and Dr. Combs. She was last seen by Dr. Combs 5/2023, no need for breast imaging. She continues to note joint pains that are not worse and does not wish to switch AIs and wishes to continue with Letrozole. She endorses wosening of hotflashes but declines starting on venlafaxine or taking a break from Letrozole. She continues on Nasocort for allergies. She was seen in Urgent care for sore throat and feeling of swelling and was started on Zpack 1 week ago. She notes sore throat is better but still feels as though throat is swollen. No difficulty with breathing, talking, no stridor. She also notes she dislocated her L pinky toe and was in a boot and now taped, seen by podiatry Dr. Romero. She is currently still on ppx nitrofurantoin for recurrent UTI s/s. She is in process of scheduling follow up appt with UroGYN Dr. Carranza and reschedulign neurology appt for sleep study. She is s/p eval with GI Dr. Verdugo and scheduled for CNY 8/2023.  Denies fevers/chills, SOB, cough, CP, palpitations, abd pain, n/v/d, swelling to extremities, hematuria, BRBPR, abnormal vaginal bleeding. She continues to endorse weight gain despite diet. \par \par MRI Abd: no suspicious liver lesions. several scattered T2 hyperintense foci representing cysts and hemangiomas. largest 9mm. \par MRI Breast:1/2023 BIRADS 2 \par DEXA: 3/2022 normal bone density \par CT CAP: 12/2022 subcentimeter liver lesions\par MRI L spine: 12/2022 L2-3 disc protrusion\par GYN: Dr. Delio Jean Baptiste remains on daily ppx nitrofurantoin for UTI s/s \par Colonoscopy: s/p eval with Dr. Verdugo 5/24/23 CNY scheduled for 8/2023 \par PCP: Dr. Barrios \par Neurologist: Dr. Lynn and has upcoming follow up \par Rhuem: needs to make appt \par

## 2023-08-08 ENCOUNTER — RESULT REVIEW (OUTPATIENT)
Age: 48
End: 2023-08-08

## 2023-08-08 ENCOUNTER — APPOINTMENT (OUTPATIENT)
Dept: HEMATOLOGY ONCOLOGY | Facility: CLINIC | Age: 48
End: 2023-08-08
Payer: COMMERCIAL

## 2023-08-08 ENCOUNTER — LABORATORY RESULT (OUTPATIENT)
Age: 48
End: 2023-08-08

## 2023-08-08 VITALS
WEIGHT: 205 LBS | SYSTOLIC BLOOD PRESSURE: 149 MMHG | BODY MASS INDEX: 36.32 KG/M2 | HEIGHT: 63 IN | RESPIRATION RATE: 18 BRPM | OXYGEN SATURATION: 97 % | DIASTOLIC BLOOD PRESSURE: 103 MMHG | HEART RATE: 82 BPM | TEMPERATURE: 99.2 F

## 2023-08-08 DIAGNOSIS — R59.0 LOCALIZED ENLARGED LYMPH NODES: ICD-10-CM

## 2023-08-08 DIAGNOSIS — J02.9 ACUTE PHARYNGITIS, UNSPECIFIED: ICD-10-CM

## 2023-08-08 LAB
25(OH)D3 SERPL-MCNC: 45.8 NG/ML
ALBUMIN SERPL ELPH-MCNC: 4.6 G/DL
ALP BLD-CCNC: 97 U/L
ALT SERPL-CCNC: 20 U/L
ANION GAP SERPL CALC-SCNC: 17 MMOL/L
AST SERPL-CCNC: 17 U/L
BILIRUB SERPL-MCNC: 0.6 MG/DL
BUN SERPL-MCNC: 17 MG/DL
CALCIUM SERPL-MCNC: 9.5 MG/DL
CHLORIDE SERPL-SCNC: 104 MMOL/L
CO2 SERPL-SCNC: 25 MMOL/L
CREAT SERPL-MCNC: 0.79 MG/DL
EGFR: 92 ML/MIN/1.73M2
FERRITIN SERPL-MCNC: 74 NG/ML
GLUCOSE SERPL-MCNC: 64 MG/DL
IRON SATN MFR SERPL: 45 %
IRON SERPL-MCNC: 135 UG/DL
POTASSIUM SERPL-SCNC: 4.6 MMOL/L
PROT SERPL-MCNC: 6.9 G/DL
SODIUM SERPL-SCNC: 146 MMOL/L
TIBC SERPL-MCNC: 297 UG/DL
TSH SERPL-ACNC: 1.76 UIU/ML
UIBC SERPL-MCNC: 162 UG/DL

## 2023-08-08 PROCEDURE — 99214 OFFICE O/P EST MOD 30 MIN: CPT | Mod: 25

## 2023-08-08 PROCEDURE — 36415 COLL VENOUS BLD VENIPUNCTURE: CPT

## 2023-08-08 NOTE — PHYSICAL EXAM
[Fully active, able to carry on all pre-disease performance without restriction] : Status 0 - Fully active, able to carry on all pre-disease performance without restriction [Normal] : affect appropriate [de-identified] : oral muscosa without eryhema,  difficult to visualize throat due to tongue and mallampati score 4, no stridor, no difficulty breathing or talking  [de-identified] : no supraclavicular or infraclavicular adenopathy, no cervical adenopathy  [de-identified] : b/l mastectomy with silicone implants symmetrical, no obvious masses or lesions, no skin dimpling, no axillary adenopathy

## 2023-08-08 NOTE — ASSESSMENT
[FreeTextEntry1] : Ms. Jonas is a 47 year old female with a history of left HER2+/ER+ pT1cN1 stage 2a breast cancer diagnosed 10/2017 s/p neoadjuvant TCHP followed by b/l mastectomy with node dissection, negative for residual disease, pCR. She is s/p 12 additional doses of Herceptin/Perjeta. She is s/p radiation. She started Nerlynx in 1/2020. She also started on Zoladex and Exemestane in 2019.  Now on Letrozole 1/2021. Completed Nerlynx 5/2021.   #Breast Cancer  - s/p neoadjuvant TCHP followed by b/l mastectomy with node dissection negative for residual disease, pCR - s/p 12 doses of Herceptin/Perjeta - PET/CT 1/2021 negative for any evidence of metastases  - Completed 1 year of Nerlynx 5/2021  - s/p bx in 11/2021 revealing fat necrosis  - Started AI 2019 and switched to Letrozole with monthly Zoladex. She continues to experience persistent stable joint pains diffusely. Patient declines switching to another AI.  - s/p CT CAP 12/16/22 no evidence of malignancy with subcentimeter liver lesions - s/p MRI breast 1/2023 BIRADS 2  - s/p MRI Abd 5/2023 no suspicious liver lesions. several scattered T2 hyperintense foci representing cysts and hemangiomas. Largest 9mm. Reviewed.  - 6/6/23 vs and CBC reviewed; WBC 4.84, hgb 13.8, plt 245. Stable complaints on Letrozole. Continues to decline switching AI. Continue Letrozole and monthly Zoladex. Due for Zoladex today; proceed. Continue follow up with Dr. Combs, last seen 5/2023, note reviewed. No need for breast imaging. Continue clinical breast exams.  - 7/11/23 vs and CBC reviewed; WBC 5.46, hgb 14.1, plt 284. Continue Letrozole and Zoladex. Due for Zoladex today. Patient presents with similar complaints of joint pains, hair loss stable and does not wish to switch AIs or take a break.  - 8/8/23 - vs reviewed. labs drawn in office today. wbc 4.32, hgb 13.3, plt 261. continue zoladex and letrozole. complaining of L sided pulling, concerned about implant - will dw Dr. Santana and call Dr. Combs if needed. If it continues will check US of L breast to check on implant. no palpable abnormality noted.  #Sore Throat  - s/p zpack - taking zertec -  recommend follow up with ENT  #L Pinky Toe Dislocation  - s/p eval with podiatry Dr. Romero  #Back pain  - XR no suspicious lytic or blastic lesions - MRI L spine 12/7/22 revealing disc protrusion   follow up with neurology   #Lymphedema  - Re-advised will benefit from PT - Patient has not gone   #Bone Density   - DEXA 3/2022 L spine T score -0.9, L femoral neck T score -0.2, L femur T score -0.3. Normal bone density.  - Continue vitamin D and weight bearing exercises  - 3/24 -  DEXA will be due  #Joint pains  - 2/2 AI or Zoladex. Patient declines switching AI.  - Continue follow up with Rheum/Neuro/PCP  - 6/6/23 stable. Patient does not wish to switch AI or introduce other medications. - 7/11/23 stable pains. Again offered to switch AI or take drug holiday. Patient does not wish to.  -8/8/23 -- Continue Letrozole. stable pains  #Fatigue  - Likely 2/2 lingering s/s of recent viral symptoms and/or AI - Continue to monitor iron studies, B12/folate, TFTs   #Health Maintenance - UroGYN: Dr. Carranza continues on ppx nitrofurantoin - CNY s/p eval with Dr. Verdugo 5/2023 CNY scheduled 8/2023  - PCP Dr. Barrios. Continue follow up as instructed.  - Neuro Dr. Cox. Continue follow up as instructed.   #Abd Pain/Liver Lesions  - s/p CT CAP 12/2022 negative. Noted subcentimeter liver lesions.  - Follow up MRI abd for assessment of liver lesions 5/2023 shows 9mm cyst and 2 hemangiomas .Otherwise, no liver lesions. LFT's/ ALK Phos  normal.  - 5/4/23 MRI abdomen ( [facility])- no suspicious liver lesions. several scattered T2 hyperintense foci representing cysts and hemangiomas. largest 9mm. consider repeat in 1 year to ensure stability.  - 5/24- abd MRI needed  #Intermittent HA  - Endorses intermittent HA occipital and around eye - No associated neuro s/s - If persistent or worsens can consider MRI brain  - Continue follow up with Neuro   #Diarrhea  - Worse in morning  - Patient attributes to Letrozole use  - Imodium PRN  - BRAT diet  - s/p eval with Dr. Verdugo 5/24/23, note reviewed. Scheduled for CNY 10/2023. Celiac testing completed.  #Hair thinning - 2/2 AI use with Letrozole  - Offered Minoxidil 2.5mg PO. Patient declines.   #Weight Gain   2/2 AI use. Encouraged walking and exercise. Patient does not wish to switch AI.   #Recurrent Infections add immunoglobulins, IgG subsets to next visit   RTC in 1 month with next monthly Zoladex with CBC with diff, CMP, UA, irons, b12/folate, vit D, TSH, FT4, immunoglobulins, IgG subsets Discussed with patient spacing out appts for RPA q3mos. Patient wishes to come monthly for RPA with monthly Zoladex injections.

## 2023-08-08 NOTE — REVIEW OF SYSTEMS
[Chills] : chills [Fatigue] : fatigue [Recent Change In Weight] : ~T recent weight change [Diarrhea: Grade 1 - Increase of <4 stools per day over baseline; mild increase in ostomy output compared to baseline] : Diarrhea: Grade 1 - Increase of <4 stools per day over baseline; mild increase in ostomy output compared to baseline [Joint Pain] : joint pain [Joint Stiffness] : joint stiffness [Muscle Pain] : muscle pain [Anxiety] : anxiety [Hot Flashes] : hot flashes [Negative] : Allergic/Immunologic [Fever] : no fever [Night Sweats] : no night sweats [Palpitations] : no palpitations [Shortness Of Breath] : no shortness of breath [Wheezing] : no wheezing [Cough] : no cough [SOB on Exertion] : no shortness of breath during exertion [Abdominal Pain] : no abdominal pain [Skin Rash] : no skin rash [FreeTextEntry2] : +gained 2lbs since last visit  [FreeTextEntry3] : +follows with optho  [FreeTextEntry4] : +recent sore throat s/p Z pack, feels as though throat swollen without difficulty breathing or stridor,  [FreeTextEntry7] : +persistent diarrhea in the mornings takes Imodium PRN, seeing GI Dr. Verdugo for CNY 8/2023  [FreeTextEntry8] : +on ppx nitrofurantoin per UroGYN Dr. Kebede [FreeTextEntry9] : recent dislocation of L pinky toe seen by Dr. Romero podiatry  [de-identified] : +hair thinning- not worse

## 2023-08-08 NOTE — HISTORY OF PRESENT ILLNESS
[de-identified] : Ms Jonas is a 47 year old woman who was diagnosed with a pT1cN1 stage 2a , ER + CA+ HER 2 nicole positive breast cancer in October 2017, after self palpating a lump in the left. She had breast imaging at [facility] in [place]. Left breast biopsy was done at [facility] in [facility].\par  \par  She had neoadjuvant chemotherapy with Dr Pacheco, 11/2017 TCHP had pCR  followed by a year completion of HP.  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 On b12 shots .  Bilateral mastectomies with reconstruction with Askikari at [facility] which revealed no evidence of residual disease. She underwent post-operative radiation therapy with Dr Purdy at [facility].  She underwent menopause during chemotherapy, and then menses resumed 2019.  She then started ovarian suppression with Zoladex in March of 2019. She  then began exemestane in 2019.  \par  Hx of chest pain - [facility] \par  \par  Began Nerlynx in January of 2020 .  + diarrhea, intermittent toe infections.  She had several breaks in Nerlyx treatments because of infection and respiratory illness.  Tested negative for COVID. Total of 13 weeks of breaks from Nerlynx.  She believes due to complete late April 2021. currently on 4 pills a day \par  \par  There have been several post operative infections and complication requiring multiple reconstructive surgeries.  Chronic lymphedema of both arms, also has had several episodes of bilateral chest wall cellullitis.\par  hx of uti on spressive nitrofuriton.  hx of high bp \par  \par  She is here to transfer care. \par  \par  Sees rheumatology. a lot of joint pains. \par  \par  PETCT negative for any mets or lesions jan 2021 \par  \par  saw Dr. Combs- did R axilla US due to palpable lymph node ( was not sure if this was r/t J&J shot)\par  \par  less gi symptoms now off nerlynx( may 2021)  \par  \par  mri breast mammo 1/22/22 [de-identified] : Patient seen and examined today for follow up for the management of history of breast cancer. She remains on Letrozole and Zoladex. Zoladex due today.  Complains of throat swelling. Lots of personal stress - son and BARBRA was hospitalized. Didn't take BP meds. Remains on letrozole.  MRI Abd: no suspicious liver lesions. several scattered T2 hyperintense foci representing cysts and hemangiomas. largest 9mm.  MRI Breast:1/2023 BIRADS 2  DEXA: 3/2022 normal bone density  CT CAP: 12/2022 subcentimeter liver lesions MRI L spine: 12/2022 L2-3 disc protrusion GYN: Dr. Kebede UroHIEU remains on daily ppx nitrofurantoin for UTI s/s  Colonoscopy: s/p eval with Dr. Verdugo 5/24/23 CNY scheduled for 10/23 PCP: Dr. Barrios  Neurologist: Dr. Lynn and has upcoming follow up  Rhuem: needs to make appt

## 2023-08-09 LAB
DEPRECATED KAPPA LC FREE/LAMBDA SER: 1.8 RATIO
IGA SER QL IEP: 150 MG/DL
IGG SER QL IEP: 1131 MG/DL
IGM SER QL IEP: 49 MG/DL
KAPPA LC CSF-MCNC: 0.97 MG/DL
KAPPA LC SERPL-MCNC: 1.75 MG/DL

## 2023-08-10 LAB
IGG SUBSET TOTAL IGG: 1163 MG/DL
IGG1 SER-MCNC: 521 MG/DL
IGG2 SER-MCNC: 419 MG/DL
IGG3 SER-MCNC: 38 MG/DL
IGG4 SER-MCNC: 115 MG/DL

## 2023-09-04 ENCOUNTER — RESULT REVIEW (OUTPATIENT)
Age: 48
End: 2023-09-04

## 2023-09-05 ENCOUNTER — LABORATORY RESULT (OUTPATIENT)
Age: 48
End: 2023-09-05

## 2023-09-05 ENCOUNTER — RESULT REVIEW (OUTPATIENT)
Age: 48
End: 2023-09-05

## 2023-09-05 ENCOUNTER — APPOINTMENT (OUTPATIENT)
Dept: HEMATOLOGY ONCOLOGY | Facility: CLINIC | Age: 48
End: 2023-09-05
Payer: COMMERCIAL

## 2023-09-05 VITALS
HEART RATE: 87 BPM | BODY MASS INDEX: 37.39 KG/M2 | SYSTOLIC BLOOD PRESSURE: 163 MMHG | TEMPERATURE: 99.1 F | DIASTOLIC BLOOD PRESSURE: 106 MMHG | OXYGEN SATURATION: 96 % | HEIGHT: 63 IN | RESPIRATION RATE: 18 BRPM | WEIGHT: 211 LBS

## 2023-09-05 PROCEDURE — 99214 OFFICE O/P EST MOD 30 MIN: CPT | Mod: 25

## 2023-09-05 PROCEDURE — 36415 COLL VENOUS BLD VENIPUNCTURE: CPT

## 2023-09-05 NOTE — HISTORY OF PRESENT ILLNESS
[de-identified] : Ms Jonas is a 47 year old woman who was diagnosed with a pT1cN1 stage 2a , ER + [place]+ HER 2 nicole positive breast cancer in October 2017, after self palpating a lump in the left. She had breast imaging at [facility] in [place]. [place] [facility] [place] [facility] [place]  \par  She had neoadjuvant chemotherapy with Dr Pacheco, 11/2017 TCHP had pCR  followed by a year completion of HP.  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 On b12 shots .  Bilateral mastectomies with reconstruction with Askikari at [facility] which revealed no evidence of residual disease. She underwent post-operative radiation therapy with Dr Purdy at [facility].  She underwent menopause during chemotherapy, and then menses resumed 2019.  She then started ovarian suppression with Zoladex in March of 2019. She  then began exemestane in 2019.  \par  Hx of chest pain - [facility] \par  \par  Began Nerlynx in January of 2020 .  + diarrhea, intermittent toe infections.  She had several breaks in Nerlyx treatments because of infection and respiratory illness.  Tested negative for COVID. Total of 13 weeks of breaks from Nerlynx.  She believes due to complete late April 2021. currently on 4 pills a day \par  \par  There have been several post operative infections and complication requiring multiple reconstructive surgeries.  Chronic lymphedema of both arms, also has had several episodes of bilateral chest wall cellullitis.\par  hx of uti on spressive nitrofuriton.  hx of high bp \par  \par  She is here to transfer care. \par  \par  Sees rheumatology. a lot of joint pains. \par  \par  PETCT negative for any mets or lesions jan 2021 \par  \par  saw Dr. Combs- did R axilla US due to palpable lymph node ( was not sure if this was r/t J&J shot)\par  \par  less gi symptoms now off nerlynx( may 2021)  \par  \par  mri breast mammo 1/22/22 [de-identified] : Patient seen and examined today for follow up for the management of history of breast cancer. She remains on Letrozole and Zoladex. Zoladex due today.  Remains on letrozole. still with aches and pains  L sided breast pulling sensation - US of L breast to check on implant.  MRI Abd: no suspicious liver lesions. several scattered T2 hyperintense foci representing cysts and hemangiomas. largest 9mm.  MRI Breast:1/2023 BIRADS 2  DEXA: 3/2022 normal bone density  CT CAP: 12/2022 subcentimeter liver lesions MRI L spine: 12/2022 L2-3 disc protrusion GYN: Dr. Kebede UroHIEU remains on daily ppx nitrofurantoin for UTI s/s  Colonoscopy: s/p eval with Dr. Verdugo 5/24/23 CNY scheduled for 10/23 PCP: Dr. Barrios  Neurologist: Dr. Lynn and has upcoming follow up  Rhuem: needs to make appt

## 2023-09-05 NOTE — PHYSICAL EXAM
[Fully active, able to carry on all pre-disease performance without restriction] : Status 0 - Fully active, able to carry on all pre-disease performance without restriction [Normal] : affect appropriate [de-identified] : oral muscosa without eryhema,  difficult to visualize throat due to tongue and mallampati score 4, no stridor, no difficulty breathing or talking  [de-identified] : no supraclavicular or infraclavicular adenopathy, no cervical adenopathy  [de-identified] : b/l mastectomy with silicone implants symmetrical, no obvious masses or lesions, no skin dimpling, no axillary adenopathy

## 2023-09-05 NOTE — REVIEW OF SYSTEMS
[Fever] : no fever [Chills] : chills [Night Sweats] : no night sweats [Fatigue] : fatigue [Recent Change In Weight] : ~T recent weight change [Diarrhea: Grade 1 - Increase of <4 stools per day over baseline; mild increase in ostomy output compared to baseline] : Diarrhea: Grade 1 - Increase of <4 stools per day over baseline; mild increase in ostomy output compared to baseline [Joint Stiffness] : joint stiffness [Muscle Pain] : muscle pain [Anxiety] : anxiety [Hot Flashes] : hot flashes [FreeTextEntry2] : +gained 2lbs since last visit  [FreeTextEntry3] : +follows with optho  [FreeTextEntry4] : +recent sore throat s/p Z pack, feels as though throat swollen without difficulty breathing or stridor,  [FreeTextEntry8] : +on ppx nitrofurantoin per UroGYN Dr. Kebede [FreeTextEntry9] : recent dislocation of L pinky toe seen by Dr. Romero podiatry  [de-identified] : +hair thinning- not worse  [Palpitations] : no palpitations [Shortness Of Breath] : no shortness of breath [Wheezing] : no wheezing [Cough] : no cough [SOB on Exertion] : no shortness of breath during exertion [Abdominal Pain] : no abdominal pain [Joint Pain] : joint pain [Skin Rash] : no skin rash [Negative] : Constitutional [FreeTextEntry7] : +persistent diarrhea in the mornings takes Imodium PRN, seeing GI Dr. Verdugo for CNY 10/2023

## 2023-09-05 NOTE — ASSESSMENT
[FreeTextEntry1] : Ms. Jonas is a 47 year old female with a history of left HER2+/ER+ pT1cN1 stage 2a breast cancer diagnosed 10/2017 s/p neoadjuvant TCHP followed by b/l mastectomy with node dissection, negative for residual disease, pCR. She is s/p 12 additional doses of Herceptin/Perjeta. She is s/p radiation. She started Nerlynx in 1/2020. She also started on Zoladex and Exemestane in 2019.  Now on Letrozole 1/2021. Completed Nerlynx 5/2021.   #Breast Cancer  - s/p neoadjuvant TCHP followed by b/l mastectomy with node dissection negative for residual disease, pCR - s/p 12 doses of Herceptin/Perjeta - PET/CT 1/2021 negative for any evidence of metastases  - Completed 1 year of Nerlynx 5/2021  - s/p bx in 11/2021 revealing fat necrosis  - Started AI 2019 and switched to Letrozole with monthly Zoladex. She continues to experience persistent stable joint pains diffusely. Patient declines switching to another AI.  - s/p CT CAP 12/16/22 no evidence of malignancy with subcentimeter liver lesions - s/p MRI breast 1/2023 BIRADS 2  - s/p MRI Abd 5/2023 no suspicious liver lesions. several scattered T2 hyperintense foci representing cysts and hemangiomas. Largest 9mm. Reviewed.  - 6/6/23 vs and CBC reviewed; WBC 4.84, hgb 13.8, plt 245. Stable complaints on Letrozole. Continues to decline switching AI. Continue Letrozole and monthly Zoladex. Due for Zoladex today; proceed. Continue follow up with Dr. Combs, last seen 5/2023, note reviewed. No need for breast imaging. Continue clinical breast exams.  - 7/11/23 vs and CBC reviewed; WBC 5.46, hgb 14.1, plt 284. Continue Letrozole and Zoladex. Due for Zoladex today. Patient presents with similar complaints of joint pains, hair loss stable and does not wish to switch AIs or take a break.  - 8/8/23 - vs reviewed. labs drawn in office today. wbc 4.32, hgb 13.3, plt 261. continue zoladex and letrozole. complaining of L sided pulling, concerned about implant - will dw Dr. Santana and call Dr. Combs if needed. If it continues will check US of L breast to check on implant. no palpable abnormality noted. 9/5/23 - vs and labs reviewed. labs drawn in office today.  wbc 5.11, hgb 13.9, plts 284.Continue Letrozole and Zoladex. Due for Zoladex today. Patient presents with similar complaints of joint pains, hair loss stable and does not wish to switch AIs or take a break.   #Back pain  - XR no suspicious lytic or blastic lesions - MRI L spine 12/7/22 revealing disc protrusion   follow up with neurology   #Lymphedema  - Re-advised will benefit from PT - Patient has not gone   #Bone Density   - DEXA 3/2022 L spine T score -0.9, L femoral neck T score -0.2, L femur T score -0.3. Normal bone density.  - Continue vitamin D and weight bearing exercises  - 3/24 -  DEXA will be due  #Joint pains  - 2/2 AI or Zoladex. Patient declines switching AI.  - Continue follow up with Rheum/Neuro/PCP  - 6/6/23 stable. Patient does not wish to switch AI or introduce other medications. - 7/11/23 stable pains. Again offered to switch AI or take drug holiday. Patient does not wish to.  -8/8/23 -- Continue Letrozole. stable pains  #Fatigue  - Likely 2/2 lingering s/s of recent viral symptoms and/or AI - Continue to monitor iron studies, B12/folate, TFTs   #Health Maintenance - UroGYN: Dr. Carranza continues on ppx nitrofurantoin - CNY s/p eval with Dr. Verdugo 5/2023 CNY scheduled 8/2023  - PCP Dr. Barrios. Continue follow up as instructed.  - Neuro Dr. Cox. Continue follow up as instructed.   #Abd Pain/Liver Lesions  - s/p CT CAP 12/2022 negative. Noted subcentimeter liver lesions.  - Follow up MRI abd for assessment of liver lesions 5/2023 shows 9mm cyst and 2 hemangiomas .Otherwise, no liver lesions. LFT's/ ALK Phos  normal.  - 5/4/23 MRI abdomen ( [facility])- no suspicious liver lesions. several scattered T2 hyperintense foci representing cysts and hemangiomas. largest 9mm. consider repeat in 1 year to ensure stability.  - 5/24- abd MRI needed  #Intermittent HA  - Endorses intermittent HA occipital and around eye - No associated neuro s/s - If persistent or worsens can consider MRI brain  - Continue follow up with Neuro   #Diarrhea  - Worse in morning  - Patient attributes to Letrozole use  - Imodium PRN  - BRAT diet  - s/p eval with Dr. Verdugo 5/24/23, note reviewed. Scheduled for CNY 10/2023. Celiac testing completed.  #Hair thinning - 2/2 AI use with Letrozole  - Offered Minoxidil 2.5mg PO. Patient declines.   #Weight Gain   2/2 AI use. Encouraged walking and exercise. Patient does not wish to switch AI.   #Recurrent Infections IgG wnl.  RTC in 1 month with next monthly Zoladex with CBC with diff, CMP, UA, irons, b12/folate, vit D, TSH, FT4,  Discussed with patient spacing out appts for RPA q3mos. Patient wishes to come monthly for RPA with monthly Zoladex injections.

## 2023-10-03 ENCOUNTER — APPOINTMENT (OUTPATIENT)
Dept: HEMATOLOGY ONCOLOGY | Facility: CLINIC | Age: 48
End: 2023-10-03
Payer: COMMERCIAL

## 2023-10-10 ENCOUNTER — RESULT REVIEW (OUTPATIENT)
Age: 48
End: 2023-10-10

## 2023-10-10 ENCOUNTER — APPOINTMENT (OUTPATIENT)
Dept: HEMATOLOGY ONCOLOGY | Facility: CLINIC | Age: 48
End: 2023-10-10
Payer: COMMERCIAL

## 2023-10-10 VITALS
BODY MASS INDEX: 36.14 KG/M2 | TEMPERATURE: 97.8 F | RESPIRATION RATE: 16 BRPM | WEIGHT: 204 LBS | HEART RATE: 82 BPM | HEIGHT: 63 IN | SYSTOLIC BLOOD PRESSURE: 142 MMHG | DIASTOLIC BLOOD PRESSURE: 89 MMHG | OXYGEN SATURATION: 100 %

## 2023-10-10 DIAGNOSIS — R19.7 DIARRHEA, UNSPECIFIED: ICD-10-CM

## 2023-10-10 DIAGNOSIS — M54.9 DORSALGIA, UNSPECIFIED: ICD-10-CM

## 2023-10-10 PROCEDURE — 99214 OFFICE O/P EST MOD 30 MIN: CPT

## 2023-10-15 PROBLEM — R19.7 DIARRHEA: Status: ACTIVE | Noted: 2022-03-09

## 2023-10-15 PROBLEM — M54.9 BACK PAIN: Status: ACTIVE | Noted: 2022-06-28

## 2023-10-17 ENCOUNTER — RESULT REVIEW (OUTPATIENT)
Age: 48
End: 2023-10-17

## 2023-10-26 ENCOUNTER — RESULT REVIEW (OUTPATIENT)
Age: 48
End: 2023-10-26

## 2023-10-27 ENCOUNTER — RESULT REVIEW (OUTPATIENT)
Age: 48
End: 2023-10-27

## 2023-10-27 ENCOUNTER — APPOINTMENT (OUTPATIENT)
Dept: GASTROENTEROLOGY | Facility: HOSPITAL | Age: 48
End: 2023-10-27
Payer: COMMERCIAL

## 2023-10-27 PROCEDURE — 45380 COLONOSCOPY AND BIOPSY: CPT

## 2023-11-06 ENCOUNTER — APPOINTMENT (OUTPATIENT)
Dept: GASTROENTEROLOGY | Facility: CLINIC | Age: 48
End: 2023-11-06
Payer: COMMERCIAL

## 2023-11-06 PROCEDURE — 99213 OFFICE O/P EST LOW 20 MIN: CPT | Mod: 95

## 2023-11-07 ENCOUNTER — LABORATORY RESULT (OUTPATIENT)
Age: 48
End: 2023-11-07

## 2023-11-07 ENCOUNTER — RESULT REVIEW (OUTPATIENT)
Age: 48
End: 2023-11-07

## 2023-11-07 ENCOUNTER — APPOINTMENT (OUTPATIENT)
Dept: HEMATOLOGY ONCOLOGY | Facility: CLINIC | Age: 48
End: 2023-11-07
Payer: COMMERCIAL

## 2023-11-07 VITALS
DIASTOLIC BLOOD PRESSURE: 93 MMHG | HEIGHT: 63 IN | RESPIRATION RATE: 16 BRPM | HEART RATE: 89 BPM | OXYGEN SATURATION: 95 % | BODY MASS INDEX: 36.02 KG/M2 | WEIGHT: 203.31 LBS | SYSTOLIC BLOOD PRESSURE: 141 MMHG | TEMPERATURE: 98.3 F

## 2023-11-07 DIAGNOSIS — M25.50 PAIN IN UNSPECIFIED JOINT: ICD-10-CM

## 2023-11-07 PROCEDURE — 99213 OFFICE O/P EST LOW 20 MIN: CPT

## 2023-12-05 ENCOUNTER — RESULT REVIEW (OUTPATIENT)
Age: 48
End: 2023-12-05

## 2023-12-05 ENCOUNTER — APPOINTMENT (OUTPATIENT)
Dept: HEMATOLOGY ONCOLOGY | Facility: CLINIC | Age: 48
End: 2023-12-05
Payer: COMMERCIAL

## 2023-12-05 DIAGNOSIS — Z98.82 BREAST IMPLANT STATUS: ICD-10-CM

## 2023-12-05 DIAGNOSIS — K52.9 NONINFECTIVE GASTROENTERITIS AND COLITIS, UNSPECIFIED: ICD-10-CM

## 2023-12-05 DIAGNOSIS — N64.4 MASTODYNIA: ICD-10-CM

## 2023-12-05 PROCEDURE — 99214 OFFICE O/P EST MOD 30 MIN: CPT

## 2023-12-17 PROBLEM — N64.4 BREAST PAIN, LEFT: Status: ACTIVE | Noted: 2023-09-05

## 2023-12-17 PROBLEM — K52.9 CHRONIC DIARRHEA: Status: ACTIVE | Noted: 2023-05-24

## 2023-12-17 PROBLEM — Z98.82 BREAST IMPLANT STATUS: Status: ACTIVE | Noted: 2022-10-24

## 2023-12-17 NOTE — ASSESSMENT
[FreeTextEntry1] : #Breast Cancer - s/p neoadjuvant TCHP followed by b/l mastectomy with node dissection negative for residual disease, pCR - s/p 12 doses of Herceptin/Perjeta - PET/CT 1/2021 negative for any evidence of metastases - Completed 1 year of Nerlynx 5/2021 - s/p bx in 11/2021 revealing fat necrosis - Started AI 2019 and switched to Letrozole with monthly Zoladex. She continues to experience persistent stable joint pains diffusely. Patient declines switching to another AI. - s/p CT CAP 12/16/22 no evidence of malignancy with subcentimeter liver lesions - s/p MRI breast 1/2023 BIRADS 2 - s/p MRI Abd 5/2023 no suspicious liver lesions. several scattered T2 hyperintense foci representing cysts and hemangiomas. Largest 9mm. Reviewed. - 6/6/23 vs and CBC reviewed; WBC 4.84, hgb 13.8, plt 245. Stable complaints on Letrozole. Continues to decline switching AI. Continue Letrozole and monthly Zoladex. Due for Zoladex today; proceed. Continue follow up with Dr. Combs, last seen 5/2023, note reviewed. No need for breast imaging. Continue clinical breast exams. - 7/11/23 vs and CBC reviewed; WBC 5.46, hgb 14.1, plt 284. Continue Letrozole and Zoladex. Due for Zoladex today. Patient presents with similar complaints of joint pains, hair loss stable and does not wish to switch AIs or take a break. - 8/8/23 - vs reviewed. labs drawn in office today. wbc 4.32, hgb 13.3, plt 261. continue zoladex and letrozole. complaining of L sided pulling, concerned about implant - will dw Dr. Santana and call Dr. Combs if needed. If it continues will check US of L breast to check on implant. no palpable abnormality noted. - 9/5/23 - vs and labs reviewed. labs drawn in office today. wbc 5.11, hgb 13.9, plts 284.Continue Letrozole and Zoladex. Due for Zoladex today. Patient presents with similar complaints of joint pains, hair loss stable and does not wish to switch AIs or take a break. - 10/10/23 vs and CBC reviewed; WBC 4.59, hgb 13.8, plt 264. Continues on Letrozole and monthly Zoladex. 1 week delayed for Zoladex. She continues to note same systemic complaints. Again discussed switching AIs or taking drug holiday. Patient hesitant and prefers to stay on Letrozole. Has US L breast implant 10/18/23. -11/7/23 - vs and labs reviewed. labs drawn in office today. continue letrozole, calcium and vit D. US reviewed - no evidence of abnormality. If breast pain continues will do MRI - 12/5/23 CBC reviewed; WBC 4.97, hgb 13.7, plt 268. Remains on Letrozole. Stable complaints and does not want to switch. Due for Zoladex today. s/p US L breast with persistent pain and now pain to R side. Will get bilateral MRI breast to assess implants   #Back pain - XR no suspicious lytic or blastic lesions - MRI L spine 12/7/22 revealing disc protrusion - Again advised follow up with Neurology. Has not done so  #Lymphedema - Re-advised will benefit from PT - Patient has not gone  #Bone Density - DEXA 3/2022 L spine T score -0.9, L femoral neck T score -0.2, L femur T score -0.3. Normal bone density. - Again reviewed monitoring of DEXA q2y with AI use and risk of bone thinning - Due 3/2024 - Continue ca++, vit D and weight bearing exercises. She has not started ca++. Advised 1200mg daily  #Joint pains - 2/2 AI or Zoladex. Patient declines switching AI. - Continue follow up with Rheum/Neuro/PCP - 6/6/23 stable. Patient does not wish to switch AI or introduce other medications. - 7/11/23 stable pains. Again offered to switch AI or take drug holiday. Patient does not wish to. - 8/8/23 - Continue Letrozole. stable pains - 10/10/23 as above, continues to note systemic complaints does not want to switch AI or take drug holiday - 11/7/23 - stable - 12/5/23 stable, does not want to switch AI   #Fatigue - Likely 2/2 lingering s/s of recent viral symptoms and/or AI - Continue to monitor iron studies, B12/folate, TFTs  #Abd Pain/Liver Lesions - s/p CT CAP 12/2022 negative. Noted subcentimeter liver lesions. - Follow up MRI abd for assessment of liver lesions 5/2023 shows 9mm cyst and 2 hemangiomas.Otherwise, no liver lesions. LFT's/ ALK Phos normal. - 5/4/23 MRI abdomen ( [facility])- no suspicious liver lesions. several scattered T2 hyperintense foci representing cysts and hemangiomas. largest 9mm. consider repeat in 1 year to ensure stability. Due 5/2024  #Intermittent HA - Endorses intermittent HA occipital and around eye - No associated neuro s/s - If persistent or worsens can consider MRI brain. Patient hesitant - Continue follow up with Neuro. Due for appt. Has not gone. - 12/5/23 again reviewed to follow up with neuro. HAs persistent and she states she is tracking them. Will order MRI Head  #Diarrhea - Worse in morning - Patient attributes to Letrozole use - Imodium PRN - BRAT diet - s/p eval with Dr. Verdugo 5/24/23, note reviewed. Scheduled for CNY 10/2023. Celiac testing completed. - s/p CNY 10/2023 still needs to leave stool sample. Patient reports she was advised Dr. Verdugo will be leaving. Given names of other GIs in the practice  #Hair thinning - 2/2 AI use with Letrozole - Offered Minoxidil 2.5mg PO. Patient declines.  #Weight Gain - 2/2 AI use. Encouraged walking and exercise. Patient does not wish to switch AI.  #Recurrent Infections - IgG wnl.  #Health Maintenance - UroGYN: Dr. Carranza continues on ppx nitrofurantoin on and off, currently off. Needs to make follow up. Also taking cranberry - CNY 10/2023 - PCP Dr. Barrios. Continue follow up as instructed. - Neuro Dr. Cox. Continue follow up as instructed. Has not gone. Advised importance.  RTC in 1 month with next monthly Zoladex with CBC with diff, CMP, UA, irons, b12/folate, vit D, TSH, FT4, Discussed with patient spacing out appts for RPA q3mos. Patient wishes to come monthly for RPA with monthly Zoladex injections.  Case and management discussed with Jose Martin Feng.

## 2023-12-17 NOTE — REVIEW OF SYSTEMS
[Palpitations] : no palpitations [Shortness Of Breath] : no shortness of breath [Wheezing] : no wheezing [Cough] : no cough [SOB on Exertion] : no shortness of breath during exertion [Abdominal Pain] : no abdominal pain [FreeTextEntry7] : +persistent loose stools in the mornings takes Imodium s/p CNY and now leaving stool sample for GI

## 2023-12-17 NOTE — PHYSICAL EXAM
[de-identified] : no supraclavicular or infraclavicular adenopathy, no cervical adenopathy  [de-identified] : b/l mastectomy with silicone implants symmetrical, no obvious masses or lesions, no skin dimpling, no axillary adenopathy, +sensitivity and tenderness to palpation of L side

## 2023-12-17 NOTE — HISTORY OF PRESENT ILLNESS
[de-identified] : Ms Jonas is a 47 year old woman who was diagnosed with a pT1cN1 stage 2a , ER + [place]+ HER 2 nicole positive breast cancer in October 2017, after self palpating a lump in the left. She had breast imaging at [facility] in [place]. [place] [facility] [place] [facility] [place]  \par  She had neoadjuvant chemotherapy with Dr Pacheco, 11/2017 TCHP had pCR  followed by a year completion of HP.  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 On b12 shots .  Bilateral mastectomies with reconstruction with Askikari at [facility] which revealed no evidence of residual disease. She underwent post-operative radiation therapy with Dr Purdy at [facility].  She underwent menopause during chemotherapy, and then menses resumed 2019.  She then started ovarian suppression with Zoladex in March of 2019. She  then began exemestane in 2019.  \par  [facility] \par  \par  Began Nerlynx in January of 2020 .  + diarrhea, intermittent toe infections.  She had several breaks in Nerlyx treatments because of infection and respiratory illness.  Tested negative for COVID. Total of 13 weeks of breaks from Nerlynx.  She believes due to complete late April 2021. currently on 4 pills a day \par  \par  There have been several post operative infections and complication requiring multiple reconstructive surgeries.  Chronic lymphedema of both arms, also has had several episodes of bilateral chest wall cellullitis.\par  hx of uti on spressive nitrofuriton.  hx of high bp \par  \par  She is here to transfer care. \par  \par  Sees rheumatology. a lot of joint pains. \par  \par  PETCT negative for any mets or lesions jan 2021 \par  \par  saw Dr. Combs- did R axilla US due to palpable lymph node ( was not sure if this was r/t J&J shot)\par  \par  less gi symptoms now off nerlynx( may 2021)  \par  \par  mri breast mammo 1/22/22 [de-identified] : Patient seen and examined today for follow up for the management of history of breast cancer. She remains on Letrozole and Zoladex. Zoladex due today. She continues to note same joint pains and fatigue that are the same. She continues to decline switching AIs or taking a break. she is not yet started ca++. Has been padmini b12 5 d/week and off on the weekends. Continues to have semi formed loose stool worse in the morning and doesnt want to try any thing stronger than Immodium. She is s/p CNY with Dr. Verdugo and is now due to send a stool sample which she has not done yet. She is currently taking nitrofurantoin and goes on and off of it. Has not followed with UroGYN. she is taking cranberry. She continues to have low back pain, has not followed with neuro. Has band like headache. s/p L breast US wnl but still experience breast pain to the R and L side now.   MRI Abd: no suspicious liver lesions. several scattered T2 hyperintense foci representing cysts and hemangiomas. largest 9mm.  MRI Breast:1/2023 BIRADS 2  DEXA: 3/2022 normal bone density  CT CAP: 12/2022 subcentimeter liver lesions MRI L spine: 12/2022 L2-3 disc protrusion GYN: Dr. Delio Jean Baptiste remains on daily ppx nitrofurantoin for UTI s/s  Colonoscopy: s/p eval with Dr. Verdugo 5/24/23 CNY scheduled for 10/23 PCP: Dr. Barrios  Neurologist: Dr. Lynn and has upcoming follow up  Rhuem: needs to make appt

## 2024-01-02 ENCOUNTER — RESULT REVIEW (OUTPATIENT)
Age: 49
End: 2024-01-02

## 2024-01-02 ENCOUNTER — APPOINTMENT (OUTPATIENT)
Dept: HEMATOLOGY ONCOLOGY | Facility: CLINIC | Age: 49
End: 2024-01-02

## 2024-01-02 VITALS
DIASTOLIC BLOOD PRESSURE: 88 MMHG | HEIGHT: 63 IN | BODY MASS INDEX: 34.73 KG/M2 | TEMPERATURE: 98.6 F | RESPIRATION RATE: 16 BRPM | HEART RATE: 79 BPM | WEIGHT: 196 LBS | SYSTOLIC BLOOD PRESSURE: 139 MMHG | OXYGEN SATURATION: 96 %

## 2024-01-21 ENCOUNTER — RESULT REVIEW (OUTPATIENT)
Age: 49
End: 2024-01-21

## 2024-01-29 ENCOUNTER — LABORATORY RESULT (OUTPATIENT)
Age: 49
End: 2024-01-29

## 2024-01-30 ENCOUNTER — RESULT REVIEW (OUTPATIENT)
Age: 49
End: 2024-01-30

## 2024-01-30 ENCOUNTER — APPOINTMENT (OUTPATIENT)
Dept: HEMATOLOGY ONCOLOGY | Facility: CLINIC | Age: 49
End: 2024-01-30
Payer: COMMERCIAL

## 2024-01-30 VITALS
WEIGHT: 197 LBS | RESPIRATION RATE: 18 BRPM | HEIGHT: 63 IN | SYSTOLIC BLOOD PRESSURE: 138 MMHG | HEART RATE: 87 BPM | BODY MASS INDEX: 34.91 KG/M2 | DIASTOLIC BLOOD PRESSURE: 96 MMHG | OXYGEN SATURATION: 97 % | TEMPERATURE: 99.3 F

## 2024-01-30 DIAGNOSIS — R63.5 ABNORMAL WEIGHT GAIN: ICD-10-CM

## 2024-01-30 DIAGNOSIS — K76.9 LIVER DISEASE, UNSPECIFIED: ICD-10-CM

## 2024-01-30 DIAGNOSIS — E55.9 VITAMIN D DEFICIENCY, UNSPECIFIED: ICD-10-CM

## 2024-01-30 DIAGNOSIS — C50.912 MALIGNANT NEOPLASM OF UNSPECIFIED SITE OF LEFT FEMALE BREAST: ICD-10-CM

## 2024-01-30 DIAGNOSIS — E53.8 DEFICIENCY OF OTHER SPECIFIED B GROUP VITAMINS: ICD-10-CM

## 2024-01-30 PROCEDURE — 99214 OFFICE O/P EST MOD 30 MIN: CPT

## 2024-01-30 RX ORDER — ECONAZOLE NITRATE 10 MG/G
1 CREAM TOPICAL
Refills: 0 | Status: ACTIVE | COMMUNITY

## 2024-01-30 RX ORDER — NITROFURANTOIN MONOHYDRATE/MACROCRYSTALLINE 25; 75 MG/1; MG/1
CAPSULE ORAL
Refills: 0 | Status: ACTIVE | COMMUNITY

## 2024-01-30 RX ORDER — NITROFURANTOIN MONOHYDRATE/MACROCRYSTALLINE 25; 75 MG/1; MG/1
CAPSULE ORAL
Refills: 0 | Status: COMPLETED | COMMUNITY
End: 2024-01-30

## 2024-01-30 RX ORDER — CRANBERRY FRUIT EXTRACT 200 MG
CAPSULE ORAL
Refills: 0 | Status: ACTIVE | COMMUNITY

## 2024-01-30 RX ORDER — CHROMIUM 200 MCG
TABLET ORAL
Refills: 0 | Status: ACTIVE | COMMUNITY

## 2024-01-30 RX ORDER — SODIUM SULFATE, POTASSIUM SULFATE AND MAGNESIUM SULFATE 1.6; 3.13; 17.5 G/177ML; G/177ML; G/177ML
17.5-3.13-1.6 SOLUTION ORAL
Qty: 1 | Refills: 0 | Status: COMPLETED | COMMUNITY
Start: 2023-05-24 | End: 2024-01-30

## 2024-01-30 NOTE — REVIEW OF SYSTEMS
[Negative] : ENT [Palpitations] : no palpitations [Shortness Of Breath] : no shortness of breath [Wheezing] : no wheezing [Cough] : no cough [SOB on Exertion] : no shortness of breath during exertion [Abdominal Pain] : no abdominal pain [FreeTextEntry7] : +persistent loose stools in the mornings takes Imodium s/p CNY  [FreeTextEntry8] : Taking prophylactic Macrobid for chronic UTI

## 2024-01-30 NOTE — PHYSICAL EXAM
[de-identified] : no supraclavicular or infraclavicular adenopathy, no cervical adenopathy  [de-identified] : b/l mastectomy with silicone implants symmetrical, no obvious masses or lesions, no skin dimpling, no axillary adenopathy, +sensitivity and tenderness to palpation of L side

## 2024-01-30 NOTE — ASSESSMENT
[FreeTextEntry1] : #Breast Cancer - s/p neoadjuvant TCHP followed by b/l mastectomy with node dissection negative for residual disease, pCR - s/p 12 doses of Herceptin/Perjeta - PET/CT 1/2021 negative for any evidence of metastases - Completed 1 year of Nerlynx 5/2021 - s/p bx in 11/2021 revealing fat necrosis - Started AI 2019 and switched to Letrozole with monthly Zoladex. She continues to experience persistent stable joint pains diffusely. Patient declines switching to another AI. - s/p CT CAP 12/16/22 no evidence of malignancy with subcentimeter liver lesions - s/p MRI breast 1/2023 BIRADS 2 - s/p MRI Abd 5/2023 no suspicious liver lesions. several scattered T2 hyperintense foci representing cysts and hemangiomas. Largest 9mm. Reviewed. - 6/6/23 vs and CBC reviewed; WBC 4.84, hgb 13.8, plt 245. Stable complaints on Letrozole. Continues to decline switching AI. Continue Letrozole and monthly Zoladex. Due for Zoladex today; proceed. Continue follow up with Dr. Combs, last seen 5/2023, note reviewed. No need for breast imaging. Continue clinical breast exams. - 7/11/23 vs and CBC reviewed; WBC 5.46, hgb 14.1, plt 284. Continue Letrozole and Zoladex. Due for Zoladex today. Patient presents with similar complaints of joint pains, hair loss stable and does not wish to switch AIs or take a break. - 8/8/23 - vs reviewed. labs drawn in office today. wbc 4.32, hgb 13.3, plt 261. continue zoladex and letrozole. complaining of L sided pulling, concerned about implant - will dw Dr. Santana and call Dr. Combs if needed. If it continues will check US of L breast to check on implant. no palpable abnormality noted. - 9/5/23 - vs and labs reviewed. labs drawn in office today. wbc 5.11, hgb 13.9, plts 284.Continue Letrozole and Zoladex. Due for Zoladex today. Patient presents with similar complaints of joint pains, hair loss stable and does not wish to switch AIs or take a break. - 10/10/23 vs and CBC reviewed; WBC 4.59, hgb 13.8, plt 264. Continues on Letrozole and monthly Zoladex. 1 week delayed for Zoladex. She continues to note same systemic complaints. Again discussed switching AIs or taking drug holiday. Patient hesitant and prefers to stay on Letrozole. Has US L breast implant 10/18/23. -11/7/23 - vs and labs reviewed. labs drawn in office today. continue letrozole, calcium and vit D. US reviewed - no evidence of abnormality. If breast pain continues will do MRI - 12/5/23 CBC reviewed; WBC 4.97, hgb 13.7, plt 268. Remains on Letrozole. Stable complaints and does not want to switch. Due for Zoladex today. s/p US L breast with persistent pain and now pain to R side. Will get bilateral MRI breast to assess implants  1/30/24 - vs and labs reviewed. cbc reviewed. rescheduled for breast MRI 2/15/24. MRI brain - Unremarkable exam. 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 due for DEXA 3/7/24  #Skin biopsy done 1/29/24, taking doxy  #Back pain - XR no suspicious lytic or blastic lesions - MRI L spine 12/7/22 revealing disc protrusion - Again advised follow up with Neurology. Has not done so  #Lymphedema - Re-advised will benefit from PT - Patient has not gone  #Bone Density - DEXA 3/2022 L spine T score -0.9, L femoral neck T score -0.2, L femur T score -0.3. Normal bone density. - Again reviewed monitoring of DEXA q2y with AI use and risk of bone thinning - Due 3/2024 - Continue ca++, vit D and weight bearing exercises. She has not started ca++. Advised 1200mg daily  #Joint pains - 2/2 AI or Zoladex. Patient declines switching AI. - Continue follow up with Rheum/Neuro/PCP - 6/6/23 stable. Patient does not wish to switch AI or introduce other medications. - 7/11/23 stable pains. Again offered to switch AI or take drug holiday. Patient does not wish to. - 8/8/23 - Continue Letrozole. stable pains - 10/10/23 as above, continues to note systemic complaints does not want to switch AI or take drug holiday - 11/7/23 - stable - 12/5/23 stable, does not want to switch AI  1/30/24 - stable, does not want to switch AI   #Fatigue - Likely 2/2 lingering s/s of recent viral symptoms and/or AI - Continue to monitor iron studies, B12/folate, TFTs  #Abd Pain/Liver Lesions - s/p CT CAP 12/2022 negative. Noted subcentimeter liver lesions. - Follow up MRI abd for assessment of liver lesions 5/2023 shows 9mm cyst and 2 hemangiomas.Otherwise, no liver lesions. LFT's/ ALK Phos normal. - 5/4/23 MRI abdomen ( [facility])- no suspicious liver lesions. several scattered T2 hyperintense foci representing cysts and hemangiomas. largest 9mm. consider repeat in 1 year to ensure stability. Due 5/2024  #Intermittent HA - Endorses intermittent HA occipital and around eye - No associated neuro s/s - If persistent or worsens can consider MRI brain. Patient hesitant - Continue follow up with Neuro. Due for appt. Has not gone. - 12/5/23 again reviewed to follow up with neuro. HAs persistent and she states she is tracking them. Will order MRI Head 1/30/24 - reviewed MRI brain from 1/22/24- Unremarkable exam. 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  #Diarrhea - Worse in morning - Patient attributes to Letrozole use - Imodium PRN - BRAT diet - s/p eval with Dr. Verdugo 5/24/23, note reviewed. Scheduled for CNY 10/2023. Celiac testing completed. - s/p CNY 10/2023 still needs to leave stool sample. Patient reports she was advised Dr. Verdugo will be leaving. Given names of other GIs in the practice  #Hair thinning - 2/2 AI use with Letrozole - Offered Minoxidil 2.5mg PO. Patient declines.  #Weight Gain - 2/2 AI use. Encouraged walking and exercise. Patient does not wish to switch AI.  #Recurrent Infections - IgG wnl.  #Health Maintenance - UroGYN: Dr. Carranza continues on ppx nitrofurantoin on and off, currently off. Needs to make follow up. Also taking cranberry - CNY 10/2023 - PCP Dr. Barrios. Continue follow up as instructed. - Neuro Dr. Cox. Continue follow up as instructed. Has not gone. Advised importance.  RTC in 1 month with next monthly Zoladex with CBC with diff, CMP, UA, irons, b12/folate, vit D, TSH, FT4, Discussed with patient spacing out appts for RPA q3mos. Patient wishes to come monthly for RPA with monthly Zoladex injections.

## 2024-01-30 NOTE — HISTORY OF PRESENT ILLNESS
[de-identified] : Ms Jonas is a 47 year old woman who was diagnosed with a pT1cN1 stage 2a , ER + [place]+ HER 2 nicole positive breast cancer in October 2017, after self palpating a lump in the left. She had breast imaging at [facility] in [place]. Left breast biopsy was done at [facility] in [facility].\par  \par  She had neoadjuvant chemotherapy with Dr Pacheco, 11/2017 TCHP had pCR  followed by a year completion of HP.  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 On b12 shots .  Bilateral mastectomies with reconstruction with Askikari at [facility] which revealed no evidence of residual disease. She underwent post-operative radiation therapy with Dr Purdy at [facility].  She underwent menopause during chemotherapy, and then menses resumed 2019.  She then started ovarian suppression with Zoladex in March of 2019. She  then began exemestane in 2019.  \par  Hx of chest pain - [facility] \par  \par  Began Nerlynx in January of 2020 .  + diarrhea, intermittent toe infections.  She had several breaks in Nerlyx treatments because of infection and respiratory illness.  Tested negative for COVID. Total of 13 weeks of breaks from Nerlynx.  She believes due to complete late April 2021. currently on 4 pills a day \par  \par  There have been several post operative infections and complication requiring multiple reconstructive surgeries.  Chronic lymphedema of both arms, also has had several episodes of bilateral chest wall cellullitis.\par  hx of uti on spressive nitrofuriton.  hx of high bp \par  \par  She is here to transfer care. \par  \par  Sees rheumatology. a lot of joint pains. \par  \par  PETCT negative for any mets or lesions jan 2021 \par  \par  saw Dr. Combs- did R axilla US due to palpable lymph node ( was not sure if this was r/t J&J shot)\par  \par  less gi symptoms now off nerlynx( may 2021)  \par  \par  mri breast mammo 1/22/22 [de-identified] : Patient seen and examined today for follow up for the management of history of breast cancer.  She remains on Letrozole and Zoladex.  Zoladex due today.  She continues to have low back pain, has not followed with neuro. Has band like headache.  Skin biopsy done 1/29/24, taking doxy - Breast MRI will be rescheduled due to Biopsy Brain MRI-11/22/24  MRI Abd: no suspicious liver lesions. several scattered T2 hyperintense foci representing cysts and hemangiomas. largest 9mm.  MRI Breast:1/2023 BIRADS 2  DEXA: 3/2022 normal bone density  CT CAP: 12/2022 subcentimeter liver lesions MRI L spine: 12/2022 L2-3 disc protrusion GYN: Dr. Kebede UroHIEU remains on daily ppx nitrofurantoin for UTI s/s  Colonoscopy: s/p eval with Dr. Verdugo 5/24/23 CNY scheduled for 10/23 PCP: Dr. Barrios  Neurologist: Dr. Lynn and has upcoming follow up  Rhuem: needs to make appt

## 2024-02-27 ENCOUNTER — LABORATORY RESULT (OUTPATIENT)
Age: 49
End: 2024-02-27

## 2024-02-27 ENCOUNTER — APPOINTMENT (OUTPATIENT)
Dept: HEMATOLOGY ONCOLOGY | Facility: CLINIC | Age: 49
End: 2024-02-27
Payer: COMMERCIAL

## 2024-02-27 ENCOUNTER — RESULT REVIEW (OUTPATIENT)
Age: 49
End: 2024-02-27

## 2024-02-27 VITALS
WEIGHT: 203 LBS | DIASTOLIC BLOOD PRESSURE: 89 MMHG | HEIGHT: 63 IN | SYSTOLIC BLOOD PRESSURE: 138 MMHG | OXYGEN SATURATION: 98 % | HEART RATE: 79 BPM | RESPIRATION RATE: 18 BRPM | BODY MASS INDEX: 35.97 KG/M2 | TEMPERATURE: 97.9 F

## 2024-02-27 PROCEDURE — 99214 OFFICE O/P EST MOD 30 MIN: CPT

## 2024-03-03 NOTE — ASSESSMENT
[FreeTextEntry1] : #Breast Cancer - s/p neoadjuvant TCHP followed by b/l mastectomy with node dissection negative for residual disease, pCR - s/p 12 doses of Herceptin/Perjeta - PET/CT 1/2021 negative for any evidence of metastases - Completed 1 year of Nerlynx 5/2021 - s/p bx in 11/2021 revealing fat necrosis - Started AI 2019 and switched to Letrozole with monthly Zoladex. She continues to experience persistent stable joint pains diffusely. Patient declines switching to another AI. - s/p CT CAP 12/16/22 no evidence of malignancy with subcentimeter liver lesions - s/p MRI breast 1/2023 BIRADS 2 - s/p MRI Abd 5/2023 no suspicious liver lesions. several scattered T2 hyperintense foci representing cysts and hemangiomas. Largest 9mm. Reviewed. - 6/6/23 vs and CBC reviewed; WBC 4.84, hgb 13.8, plt 245. Stable complaints on Letrozole. Continues to decline switching AI. Continue Letrozole and monthly Zoladex. Due for Zoladex today; proceed. Continue follow up with Dr. Combs, last seen 5/2023, note reviewed. No need for breast imaging. Continue clinical breast exams. - 7/11/23 vs and CBC reviewed; WBC 5.46, hgb 14.1, plt 284. Continue Letrozole and Zoladex. Due for Zoladex today. Patient presents with similar complaints of joint pains, hair loss stable and does not wish to switch AIs or take a break. - 8/8/23 - vs reviewed. labs drawn in office today. wbc 4.32, hgb 13.3, plt 261. continue zoladex and letrozole. complaining of L sided pulling, concerned about implant - will dw Dr. Santana and call Dr. Combs if needed. If it continues will check US of L breast to check on implant. no palpable abnormality noted. - 9/5/23 - vs and labs reviewed. labs drawn in office today. wbc 5.11, hgb 13.9, plts 284.Continue Letrozole and Zoladex. Due for Zoladex today. Patient presents with similar complaints of joint pains, hair loss stable and does not wish to switch AIs or take a break. - 10/10/23 vs and CBC reviewed; WBC 4.59, hgb 13.8, plt 264. Continues on Letrozole and monthly Zoladex. 1 week delayed for Zoladex. She continues to note same systemic complaints. Again discussed switching AIs or taking drug holiday. Patient hesitant and prefers to stay on Letrozole. Has US L breast implant 10/18/23. -11/7/23 - vs and labs reviewed. labs drawn in office today. continue letrozole, calcium and vit D. US reviewed - no evidence of abnormality. If breast pain continues will do MRI - 12/5/23 CBC reviewed; WBC 4.97, hgb 13.7, plt 268. Remains on Letrozole. Stable complaints and does not want to switch. Due for Zoladex today. s/p US L breast with persistent pain and now pain to R side. Will get bilateral MRI breast to assess implants  - 1/30/24 - vs and labs reviewed. cbc reviewed. rescheduled for breast MRI 2/15/24. MRI brain - Unremarkable exam. 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 due for DEXA 3/7/24 - 2/27/24 vs and CBC reivewed; CBC wnl. Continue on Letrozole and Zoladex. Due today. She is s/p MRI brain negative. Since last visit she had 3 skin bx, to back, axilla R, and L breast with derm Dr. Joon leone reveals L inframammary combined lentiginous and melanocytic nevus and pigmented SK, Lower back juntional melanocytic nevus similar to clarks nevus, R axilla lentiginous melanocytic nevus. Follow up with derm. She states they then got inected and she had 2 rounds of doxy. She then got COVID s/p paxlovid and then flu s/p tamiflu, prednisone and albuterol and stopped letrozole during that time. She is scheduled for MRI breast 3/2024. Slight pink tinge to L breast, no open wounds, does not appear to look cellulitic. However she did have a needle bx to inframmary fold, ensure no leak to breast implant. MRI ASAP. She is scheduled 3/14/24,  Tricia was tasked to call and get appt sooner   #Nevi - Had 3 skin bx, to back, axilla R, and L breast with derm Dr. Joon Clay path reveals L inframammary combined lentiginous and melanocytic nevus and pigmented SK, Lower back junctional melanocytic nevus similar to clarks nevus, R axilla lentiginous melanocytic nevus. Follow up with derm. She states they then got infected and she had 2 rounds of doxy. She is scheduled for MRI breast 3/2024. Slight pink tinge to L breast, no open wounds, does not appear to look cellulitic. However she did have a needle bx to inframmary fold, ensure no leak to breast implant. MRI ASAP. She is scheduled 3/14/24,  Tricia was tasked to call and get appt sooner   #Back pain - XR no suspicious lytic or blastic lesions - MRI L spine 12/7/22 revealing disc protrusion - Again advised follow up with Neurology. Has not done so  #Lymphedema - Re-advised will benefit from PT - Patient has not gone  #Bone Density - DEXA 3/2022 L spine T score -0.9, L femoral neck T score -0.2, L femur T score -0.3. Normal bone density. - Again reviewed monitoring of DEXA q2y with AI use and risk of bone thinning - Continue ca++, vit D and weight bearing exercises. She has not started ca++. Advised 1200mg daily - [facility] app[facility] 3/[facility]24   #Joint pains - 2/2 AI or Zoladex. Patient declines switching AI. - Continue follow up with Rheum/Neuro/PCP - 6/6/23 stable. Patient does not wish to switch AI or introduce other medications. - 7/11/23 stable pains. Again offered to switch AI or take drug holiday. Patient does not wish to. - 8/8/23 - Continue Letrozole. stable pains - 10/10/23 as above, continues to note systemic complaints does not want to switch AI or take drug holiday - 11/7/23 - stable - 12/5/23 stable, does not want to switch AI  - 1/30/24 - stable, does not want to switch AI  - 2/27/24 stable and does not want to switch AI   #Fatigue - Likely 2/2 lingering s/s of recent viral symptoms and/or AI - Continue to monitor iron studies, B12/folate, TFTs  #Abd Pain/Liver Lesions - s/p CT CAP 12/2022 negative. Noted subcentimeter liver lesions. - Follow up MRI abd for assessment of liver lesions 5/2023 shows 9mm cyst and 2 hemangiomas.Otherwise, no liver lesions. LFT's/ ALK Phos normal. - 5/4/23 MRI abdomen ( [facility])- no suspicious liver lesions. several scattered T2 hyperintense foci representing cysts and hemangiomas. largest 9mm. consider repeat in 1 year to ensure stability. Due 5/2024  #Intermittent HA - Endorses intermittent HA occipital and around eye - No associated neuro s/s - If persistent or worsens can consider MRI brain. Patient hesitant - Continue follow up with Neuro. Due for appt. Has not gone. - 12/5/23 again reviewed to follow up with neuro. HAs persistent and she states she is tracking them. Will order MRI Head - 1/30/24 - reviewed MRI brain from 1/22/24- Unremarkable exam. 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  #Diarrhea - Worse in morning - Patient attributes to Letrozole use - Imodium PRN - BRAT diet - s/p eval with Dr. Verdugo 5/24/23, note reviewed. Scheduled for CNY 10/2023. Celiac testing completed. - s/p CNY 10/2023 still needs to leave stool sample. Patient reports she was advised Dr. Verdugo will be leaving. Given names of other GIs in the practice  #Hair thinning - 2/2 AI use with Letrozole - Offered Minoxidil 2.5mg PO. Patient declines.  #Weight Gain - 2/2 AI use. Encouraged walking and exercise. Patient does not wish to switch AI.  #Recurrent Infections - IgG wnl.  #Health Maintenance - UroGYN: Dr. Carranza continues on ppx nitrofurantoin on and off, currently off. Needs to make follow up. Also taking cranberry - CNY 10/2023 - PCP Dr. Barrios. Continue follow up as instructed. - Neuro Dr. Cox. Continue follow up as instructed. Has not gone. Advised importance.  RTC in 1 month with next monthly Zoladex with CBC with diff, CMP, UA, irons, b12/folate, vit D, TSH, FT4, Discussed with patient spacing out appts for RPA q3mos. Patient wishes to come monthly for RPA with monthly Zoladex injections.  Case and management discussed with Dr. Philippe

## 2024-03-03 NOTE — HISTORY OF PRESENT ILLNESS
[de-identified] : Ms Jonas is a 47 year old woman who was diagnosed with a pT1cN1 stage 2a , ER + [place]+ HER 2 nicole positive breast cancer in October 2017, after self palpating a lump in the left. She had breast imaging at [facility] in [place]. Left breast biopsy was done at [facility] in [facility].  She had neoadjuvant chemotherapy with Dr Pacheco, 11/2017 TCHP had pCR  followed by a year completion of HP.  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 On b12 shots .  Bilateral mastectomies with reconstruction with Drew at [facility] which revealed no evidence of residual disease. She underwent post-operative radiation therapy with Dr Purdy at [facility].  She underwent menopause during chemotherapy, and then menses resumed 2019.  She then started ovarian suppression with Zoladex in March of 2019. She  then began exemestane in 2019.   Hx of chest pain - [facility]   Began Nerlynx in January of 2020 .  + diarrhea, intermittent toe infections.  She had several breaks in Nerlyx treatments because of infection and respiratory illness.  Tested negative for COVID. Total of 13 weeks of breaks from Nerlynx.  She believes due to complete late April 2021. currently on 4 pills a day   There have been several post operative infections and complication requiring multiple reconstructive surgeries.  Chronic lymphedema of both arms, also has had several episodes of bilateral chest wall cellullitis. hx of uti on spressive nitrofuriton.  hx of high bp   She is here to transfer care.   Sees rheumatology. a lot of joint pains.   PETCT negative for any mets or lesions jan 2021   saw Dr. Combs- did R axilla US due to palpable lymph node ( was not sure if this was r/t J&J shot)  less gi symptoms now off nerlynx( may 2021)    mri breast mammo 1/22/22 [de-identified] : Patient seen and examined today for follow up for the management of history of breast cancer. She remains on Letrozole and Zoladex. Due today. She is s/p MRI brain negative. Since last visit she had 3 skin bx, to back, axilla R, and L breast with derm Dr. Joon Clay. She states they then got inected and she had 2 rounds of doxy. She then got COVID s/p paxlovid and then flu s/p tamiflu, prednisone and albuterol and stopped letrozole during that time. She is scheduled for MRI breast 3/2024.   MRI Abd: no suspicious liver lesions. several scattered T2 hyperintense foci representing cysts and hemangiomas. largest 9mm.  MRI Breast: scheduled 3/2024 at [facility] DEXA: scheduled 3/3024 at [facility] CT CAP: 12/2022 subcentimeter liver lesions MRI L spine: 12/2022 L2-3 disc protrusion MRI brain negative 1/2024  GYN: Dr. Kebede UroHIEU remains on daily ppx nitrofurantoin for UTI s/s  Colonoscopy: has follow up with Dr Perry, now left and another MD in practice  PCP: Dr. Barrios  Neurologist: Dr. Lynn and has upcoming follow up  Rhuem: needs to make appt

## 2024-03-03 NOTE — PHYSICAL EXAM
[Fully active, able to carry on all pre-disease performance without restriction] : Status 0 - Fully active, able to carry on all pre-disease performance without restriction [Normal] : affect appropriate [de-identified] : no supraclavicular or infraclavicular adenopathy, no cervical adenopathy  [de-identified] : b/l mastectomy with silicone implants symmetrical, no obvious masses or lesions, no skin dimpling, no axillary adenopathy, +sensitivity and tenderness to palpation of L side, slight pink tinge to L breast

## 2024-03-03 NOTE — REVIEW OF SYSTEMS
[Fatigue] : fatigue [Recent Change In Weight] : ~T recent weight change [Diarrhea: Grade 1 - Increase of <4 stools per day over baseline; mild increase in ostomy output compared to baseline] : Diarrhea: Grade 1 - Increase of <4 stools per day over baseline; mild increase in ostomy output compared to baseline [Joint Pain] : joint pain [Joint Stiffness] : joint stiffness [Muscle Pain] : muscle pain [Anxiety] : anxiety [Hot Flashes] : hot flashes [Negative] : Allergic/Immunologic [Palpitations] : no palpitations [Shortness Of Breath] : no shortness of breath [Wheezing] : no wheezing [SOB on Exertion] : no shortness of breath during exertion [Cough] : no cough [Abdominal Pain] : no abdominal pain [FreeTextEntry6] : s/p covid and flu in last month  [FreeTextEntry7] : +persistent loose stools in the mornings takes Imodium s/p CNY  [FreeTextEntry8] : Taking prophylactic Macrobid for chronic UTI

## 2024-03-12 ENCOUNTER — RESULT REVIEW (OUTPATIENT)
Age: 49
End: 2024-03-12

## 2024-04-02 ENCOUNTER — RESULT REVIEW (OUTPATIENT)
Age: 49
End: 2024-04-02

## 2024-04-02 ENCOUNTER — APPOINTMENT (OUTPATIENT)
Dept: HEMATOLOGY ONCOLOGY | Facility: CLINIC | Age: 49
End: 2024-04-02
Payer: COMMERCIAL

## 2024-04-02 ENCOUNTER — LABORATORY RESULT (OUTPATIENT)
Age: 49
End: 2024-04-02

## 2024-04-02 VITALS
SYSTOLIC BLOOD PRESSURE: 161 MMHG | RESPIRATION RATE: 18 BRPM | BODY MASS INDEX: 36.68 KG/M2 | WEIGHT: 207 LBS | DIASTOLIC BLOOD PRESSURE: 113 MMHG | TEMPERATURE: 98.6 F | HEIGHT: 63 IN | HEART RATE: 97 BPM | OXYGEN SATURATION: 97 %

## 2024-04-02 VITALS — DIASTOLIC BLOOD PRESSURE: 96 MMHG | SYSTOLIC BLOOD PRESSURE: 148 MMHG

## 2024-04-02 PROCEDURE — 99214 OFFICE O/P EST MOD 30 MIN: CPT

## 2024-04-02 NOTE — ASSESSMENT
[FreeTextEntry1] : #Breast Cancer - s/p neoadjuvant TCHP followed by b/l mastectomy with node dissection negative for residual disease, pCR - s/p 12 doses of Herceptin/Perjeta - PET/CT 1/2021 negative for any evidence of metastases - Completed 1 year of Nerlynx 5/2021 - s/p bx in 11/2021 revealing fat necrosis - Started AI 2019 and switched to Letrozole with monthly Zoladex. She continues to experience persistent stable joint pains diffusely. Patient declines switching to another AI. - s/p CT CAP 12/16/22 no evidence of malignancy with subcentimeter liver lesions - s/p MRI breast 1/2023 BIRADS 2 - s/p MRI Abd 5/2023 no suspicious liver lesions. several scattered T2 hyperintense foci representing cysts and hemangiomas. Largest 9mm. Reviewed. - 6/6/23 vs and CBC reviewed; WBC 4.84, hgb 13.8, plt 245. Stable complaints on Letrozole. Continues to decline switching AI. Continue Letrozole and monthly Zoladex. Due for Zoladex today; proceed. Continue follow up with Dr. Combs, last seen 5/2023, note reviewed. No need for breast imaging. Continue clinical breast exams. - 7/11/23 vs and CBC reviewed; WBC 5.46, hgb 14.1, plt 284. Continue Letrozole and Zoladex. Due for Zoladex today. Patient presents with similar complaints of joint pains, hair loss stable and does not wish to switch AIs or take a break. - 8/8/23 - vs reviewed. labs drawn in office today. wbc 4.32, hgb 13.3, plt 261. continue zoladex and letrozole. complaining of L sided pulling, concerned about implant - will dw Dr. Satnana and call Dr. Combs if needed. If it continues will check US of L breast to check on implant. no palpable abnormality noted. - 9/5/23 - vs and labs reviewed. labs drawn in office today. wbc 5.11, hgb 13.9, plts 284.Continue Letrozole and Zoladex. Due for Zoladex today. Patient presents with similar complaints of joint pains, hair loss stable and does not wish to switch AIs or take a break. - 10/10/23 vs and CBC reviewed; WBC 4.59, hgb 13.8, plt 264. Continues on Letrozole and monthly Zoladex. 1 week delayed for Zoladex. She continues to note same systemic complaints. Again discussed switching AIs or taking drug holiday. Patient hesitant and prefers to stay on Letrozole. Has US L breast implant 10/18/23. -11/7/23 - vs and labs reviewed. labs drawn in office today. continue letrozole, calcium and vit D. US reviewed - no evidence of abnormality. If breast pain continues will do MRI - 12/5/23 CBC reviewed; WBC 4.97, hgb 13.7, plt 268. Remains on Letrozole. Stable complaints and does not want to switch. Due for Zoladex today. s/p US L breast with persistent pain and now pain to R side. Will get bilateral MRI breast to assess implants  - 1/30/24 - vs and labs reviewed. cbc reviewed. rescheduled for breast MRI 2/15/24. MRI brain - Unremarkable exam. 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 due for DEXA 3/7/24 - 2/27/24 vs and CBC reivewed; CBC wnl. Continue on Letrozole and Zoladex. Due today. She is s/p MRI brain negative. Since last visit she had 3 skin bx, to back, axilla R, and L breast with derm Dr. Joon Clay path reveals L inframammary combined lentiginous and melanocytic nevus and pigmented SK, Lower back juntional melanocytic nevus similar to clarks nevus, R axilla lentiginous melanocytic nevus. Follow up with derm. She states they then got inected and she had 2 rounds of doxy. She then got COVID s/p paxlovid and then flu s/p tamiflu, prednisone and albuterol and stopped letrozole during that time. She is scheduled for MRI breast 3/2024. Slight pink tinge to L breast, no open wounds, does not appear to look cellulitic. However she did have a needle bx to inframmary fold, ensure no leak to breast implant. MRI ASAP. She is scheduled 3/14/24,  Tricia was tasked to call and get appt sooner  - 4/42/24 vs and CBC reviewed; WBC 6.67, hgb 14.3, plt 300. Continue on Letrozole and Zoladex. Due today.  s/p MRI breast 3/2024 revealing Status post bilateral mastectomy with silicone implant reconstruction with complex radial folds and no MRI evidence of intra or extracapsular implant rupture. No MRI evidence of malignancy. Any further management of the patient's complaints of pain and a palpable lump, including decision to biopsy, should be based on clinical assessment. Please note, the patient reports right rib pain. For evaluation of a rib abnormality, plain film, bone scan and if needed CAT scan can be performed for further evaluation as clinically warranted. She also has followed with ENT Dr. Muller many US of cervical LN and then had PET/CT 3/2024 revealing focal hypermetabolic corresponding to soft tissue fullness about the nasopharyngeal soft tissues and tongue base/lingual soft tissues.  Unclear if this is inflammatory versus other process. Recommend direct visualization tissue sampling if indicated. Seeing Dr. Muller today for possible scope.   #Nevi - Had 3 skin bx, to back, axilla R, and L breast with derm Dr. Joon Clay path reveals L inframammary combined lentiginous and melanocytic nevus and pigmented SK, Lower back junctional melanocytic nevus similar to clarks nevus, R axilla lentiginous melanocytic nevus. Follow up with derm. She states they then got infected and she had 2 rounds of doxy. She is scheduled for MRI breast 3/2024. Slight pink tinge to L breast, no open wounds, does not appear to look cellulitic. However she did have a needle bx to inframmary fold, ensure no leak to breast implant. MRI ASAP. She is scheduled 3/14/24,  Tricia was tasked to call and get appt sooner  - 4/2/24 Going  back to derm Dr. Clay for excision of back nevus  #Back pain - XR no suspicious lytic or blastic lesions - MRI L spine 12/7/22 revealing disc protrusion - Again advised follow up with Neurology. Has not done so  #Lymphedema - Re-advised will benefit from PT - Patient has not gone  #Bone Density - DEXA 3/2024 L spine T score -1.3, L femoral neck T score -0.2, L femur T score -0.4. R hip -0.3, R fem neck -0.6. Reviewed compared to 3/2022 minor decrease to bone density to L spine  - Again reviewed monitoring of DEXA q2y with AI use and risk of bone thinning - Continue ca++, vit D and weight bearing exercises. She has not started ca++. Advised 1200mg daily  #Joint pains - 2/2 AI or Zoladex. Patient declines switching AI. - Continue follow up with Rheum/Neuro/PCP - 6/6/23 stable. Patient does not wish to switch AI or introduce other medications. - 7/11/23 stable pains. Again offered to switch AI or take drug holiday. Patient does not wish to. - 8/8/23 - Continue Letrozole. stable pains - 10/10/23 as above, continues to note systemic complaints does not want to switch AI or take drug holiday - 11/7/23 - stable - 12/5/23 stable, does not want to switch AI  - 1/30/24 - stable, does not want to switch AI  - 2/27/24 stable and does not want to switch AI  - 4/2/24 stable and does not want to switch AI   #Fatigue - Likely 2/2 lingering s/s of recent viral symptoms and/or AI - Continue to monitor iron studies, B12/folate, TFTs  #Abd Pain/Liver Lesions - s/p CT CAP 12/2022 negative. Noted subcentimeter liver lesions. - Follow up MRI abd for assessment of liver lesions 5/2023 shows 9mm cyst and 2 hemangiomas.Otherwise, no liver lesions. LFT's/ ALK Phos normal. - 5/4/23 MRI abdomen ( [facility])- no suspicious liver lesions. several scattered T2 hyperintense foci representing cysts and hemangiomas. largest 9mm. consider repeat in 1 year to ensure stability. Due 5/2024.  - 4/2/24 of note did have PET/CT 3/2024 PET/CT 3/2024 revealing focal hypermetabolic corresponding to soft tissue fullness about the nasopharyngeal soft tissues and tongue base/lingual soft tissues.  Unclear if this is inflammatory versus other process. Recommend direct visualization tissue sampling if indicated. There was no hypermetabolic activity in chest abd or pelvis   #Intermittent HA - Endorses intermittent HA occipital and around eye - No associated neuro s/s - If persistent or worsens can consider MRI brain. Patient hesitant - Continue follow up with Neuro. Due for appt. Has not gone. - 12/5/23 again reviewed to follow up with neuro. HAs persistent and she states she is tracking them. Will order MRI Head - 1/30/24 - reviewed MRI brain from 1/22/24- Unremarkable exam. 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  #Diarrhea - Worse in morning - Patient attributes to Letrozole use - Imodium PRN - BRAT diet - s/p eval with Dr. Verdugo 5/24/23, note reviewed. Scheduled for CNY 10/2023. Celiac testing completed. - s/p CNY 10/2023 still needs to leave stool sample. Patient reports she was advised Dr. Verdugo will be leaving. Given names of other GIs in the practice  #Hair thinning - 2/2 AI use with Letrozole - Offered Minoxidil 2.5mg PO. Patient declines.  #Weight Gain - 2/2 AI use. Encouraged walking and exercise. Patient does not wish to switch AI.  #Recurrent Infections - IgG wnl.  #Health Maintenance - UroGYN: Dr. Carranza continues on ppx nitrofurantoin on and off, currently off. Needs to make follow up. Also taking cranberry - CNY 10/2023 - PCP Dr. Barrios. Continue follow up as instructed. - Neuro Dr. Cox. Continue follow up as instructed. Has not gone. Advised importance.  RTC in 1 month with next monthly Zoladex with CBC with diff, CMP, UA, irons, b12/folate, vit D, TSH, FT4, Again reiterated and Discussed with patient spacing out appts for RPA q3mos. Patient wishes to come monthly for RPA with monthly Zoladex injections.  Case and management discussed with Dr. Philippe

## 2024-04-02 NOTE — REVIEW OF SYSTEMS
[Fatigue] : fatigue [Recent Change In Weight] : ~T recent weight change [Diarrhea: Grade 1 - Increase of <4 stools per day over baseline; mild increase in ostomy output compared to baseline] : Diarrhea: Grade 1 - Increase of <4 stools per day over baseline; mild increase in ostomy output compared to baseline [Joint Pain] : joint pain [Joint Stiffness] : joint stiffness [Muscle Pain] : muscle pain [Anxiety] : anxiety [Hot Flashes] : hot flashes [Negative] : Heme/Lymph [FreeTextEntry6] : s/p covid and flu in last month  [Palpitations] : no palpitations [Shortness Of Breath] : no shortness of breath [Wheezing] : no wheezing [Cough] : no cough [SOB on Exertion] : no shortness of breath during exertion [Abdominal Pain] : no abdominal pain [FreeTextEntry7] : +persistent loose stools in the mornings takes Imodium s/p CNY  [FreeTextEntry8] : Taking prophylactic Macrobid for chronic UTI

## 2024-04-02 NOTE — PHYSICAL EXAM
[Fully active, able to carry on all pre-disease performance without restriction] : Status 0 - Fully active, able to carry on all pre-disease performance without restriction [Normal] : supple without JVD, no thyromegaly or masses appreciated [de-identified] : larger neck girth, no adenopathy appreciated  [de-identified] : b/l mastectomy with silicone implants symmetrical, no obvious masses or lesions, no skin dimpling, no axillary adenopathy, +sensitivity and tenderness to palpation of L side, slight pink tinge to L breast

## 2024-04-02 NOTE — HISTORY OF PRESENT ILLNESS
[de-identified] : Ms Jonas is a 47 year old woman who was diagnosed with a pT1cN1 stage 2a , ER + ID+ HER 2 nicole positive breast cancer in October 2017, after self palpating a lump in the left. She had breast imaging at [facility] in [place]. Left breast biopsy was [place] [facility] [place] [facility]  She had neoadjuvant chemotherapy with Dr Pacheco, 11/2017 TCHP had pCR  followed by a year completion of HP.  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 On b12 shots .  Bilateral mastectomies with reconstruction with Drew at [facility] which revealed no evidence of residual disease. She underwent post-operative radiation therapy with Dr Purdy at [facility].  She underwent menopause during chemotherapy, and then menses resumed 2019.  She then started ovarian suppression with Zoladex in March of 2019. She  then began exemestane in 2019.   Hx of chest pain - [facility]   Began Nerlynx in January of 2020 .  + diarrhea, intermittent toe infections.  She had several breaks in Nerlyx treatments because of infection and respiratory illness.  Tested negative for COVID. Total of 13 weeks of breaks from Nerlynx.  She believes due to complete late April 2021. currently on 4 pills a day   There have been several post operative infections and complication requiring multiple reconstructive surgeries.  Chronic lymphedema of both arms, also has had several episodes of bilateral chest wall cellullitis. hx of uti on spressive nitrofuriton.  hx of high bp   She is here to transfer care.   Sees rheumatology. a lot of joint pains.   PETCT negative for any mets or lesions jan 2021   saw Dr. Combs- did R axilla US due to palpable lymph node ( was not sure if this was r/t J&J shot)  less gi symptoms now off nerlynx( may 2021)    mri breast mammo 1/22/22 [de-identified] : Patient seen and examined today for follow up for the management of history of breast cancer. She remains on Letrozole and Zoladex. Due today. She is s/p MRI brain negative. Going  back to derm Dr. Clay for excision of back AK. s/p MRI breast 3/2024 revealing Status post bilateral mastectomy with silicone implant reconstruction with complex radial folds and no MRI evidence of intra or extracapsular implant rupture. No MRI evidence of malignancy. Any further management of the patient's complaints of pain and a palpable lump, including decision to biopsy, should be based on clinical assessment. Please note, the patient reports right rib pain. For evaluation of a rib abnormality, plain film, bone scan and if needed CAT scan can be performed for further evaluation as clinically warranted. She also has followed with ENT Dr. Muller many US of cervical LN and then had PET/CT 3/2024 revealing focal hypermetabolic corresponding to soft tissue fullness about the nasopharyngeal soft tissues and tongue base/lingual soft tissues.  Unclear if this is inflammatory versus other process. Recommend direct visualization tissue sampling if indicated. Seeing Dr. Muller today for possible scope. Had follow upw [facility] sleep medicine pending sleep study, told she also likely had sleep apnea. Has not followed with GI.   MRI Abd: no suspicious liver lesions. several scattered T2 hyperintense foci representing cysts and hemangiomas. largest 9mm.  MRI Breast: scheduled 3/2024 at [facility] results above  DEXA: 3/2024 osteopenia  PET/CT 3/2024 as above  MRI L spine: 12/2022 L2-3 disc protrusion MRI brain negative 1/2024  GYN: Dr. Kebede UroHIEU remains on daily ppx nitrofurantoin for UTI s/s  Colonoscopy: has follow up with Dr Perry, now left and another MD in practice  PCP: Dr. Barrios  Neurologist: Dr. Lynn and has upcoming follow up  Rhuem: needs to make appt

## 2024-04-15 ENCOUNTER — NON-APPOINTMENT (OUTPATIENT)
Age: 49
End: 2024-04-15

## 2024-04-30 ENCOUNTER — RESULT REVIEW (OUTPATIENT)
Age: 49
End: 2024-04-30

## 2024-04-30 ENCOUNTER — LABORATORY RESULT (OUTPATIENT)
Age: 49
End: 2024-04-30

## 2024-04-30 ENCOUNTER — APPOINTMENT (OUTPATIENT)
Dept: HEMATOLOGY ONCOLOGY | Facility: CLINIC | Age: 49
End: 2024-04-30
Payer: COMMERCIAL

## 2024-04-30 VITALS
SYSTOLIC BLOOD PRESSURE: 160 MMHG | OXYGEN SATURATION: 95 % | TEMPERATURE: 98.8 F | HEIGHT: 63 IN | DIASTOLIC BLOOD PRESSURE: 97 MMHG | BODY MASS INDEX: 35.44 KG/M2 | HEART RATE: 94 BPM | WEIGHT: 200 LBS | RESPIRATION RATE: 18 BRPM

## 2024-04-30 DIAGNOSIS — M85.80 OTHER SPECIFIED DISORDERS OF BONE DENSITY AND STRUCTURE, UNSPECIFIED SITE: ICD-10-CM

## 2024-04-30 DIAGNOSIS — R53.83 OTHER FATIGUE: ICD-10-CM

## 2024-04-30 DIAGNOSIS — L65.9 NONSCARRING HAIR LOSS, UNSPECIFIED: ICD-10-CM

## 2024-04-30 DIAGNOSIS — R51.9 HEADACHE, UNSPECIFIED: ICD-10-CM

## 2024-04-30 PROCEDURE — 36415 COLL VENOUS BLD VENIPUNCTURE: CPT

## 2024-04-30 PROCEDURE — 99214 OFFICE O/P EST MOD 30 MIN: CPT

## 2024-04-30 RX ORDER — DOXYCYCLINE HYCLATE 100 MG/1
100 TABLET ORAL
Refills: 0 | Status: COMPLETED | COMMUNITY
End: 2024-04-30

## 2024-04-30 RX ORDER — CETIRIZINE HCL 10 MG
TABLET ORAL
Refills: 0 | Status: ACTIVE | COMMUNITY

## 2024-04-30 NOTE — PHYSICAL EXAM
[Fully active, able to carry on all pre-disease performance without restriction] : Status 0 - Fully active, able to carry on all pre-disease performance without restriction [Normal] : affect appropriate [de-identified] : larger neck girth, no adenopathy appreciated  [de-identified] : b/l mastectomy with silicone implants symmetrical, no obvious masses or lesions, no skin dimpling, no axillary adenopathy, +sensitivity and tenderness to palpation of L side, slight pink tinge to L breast

## 2024-04-30 NOTE — HISTORY OF PRESENT ILLNESS
[de-identified] : Ms Jonas is a 47 year old woman who was diagnosed with a pT1cN1 stage 2a , ER + HI+ HER 2 nicole positive breast cancer in October 2017, after self palpating a lump in the left. She had breast imaging at [facility] in [place]. Left breast biopsy was done at [facility] in [facility].  She had neoadjuvant chemotherapy with Dr Pacheco, 11/2017 TCHP had pCR  followed by a year completion of HP.  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 On b12 shots .  Bilateral mastectomies with reconstruction with Drew at [facility] which revealed no evidence of residual disease. She underwent post-operative radiation therapy with Dr Purdy at [facility].  She underwent menopause during chemotherapy, and then menses resumed 2019.  She then started ovarian suppression with Zoladex in March of 2019. She  then began exemestane in 2019.   Hx of chest pain - [facility]   Began Nerlynx in January of 2020 .  + diarrhea, intermittent toe infections.  She had several breaks in Nerlyx treatments because of infection and respiratory illness.  Tested negative for COVID. Total of 13 weeks of breaks from Nerlynx.  She believes due to complete late April 2021. currently on 4 pills a day   There have been several post operative infections and complication requiring multiple reconstructive surgeries.  Chronic lymphedema of both arms, also has had several episodes of bilateral chest wall cellullitis. hx of uti on spressive nitrofuriton.  hx of high bp   She is here to transfer care.   Sees rheumatology. a lot of joint pains.   PETCT negative for any mets or lesions jan 2021   saw Dr. Combs- did R axilla US due to palpable lymph node ( was not sure if this was r/t J&J shot)  less gi symptoms now off nerlynx( may 2021)    mri breast mammo 1/22/22 [de-identified] : Patient seen and examined today for follow up for the management of history of breast cancer. She remains on Letrozole and Zoladex. Due today. She is s/p MRI brain negative. Patient's mother recently diagnosed with breast CA.     She also has followed with ENT Dr. Muller many US of cervical LN and then had PET/CT 3/2024 revealing focal hypermetabolic corresponding to soft tissue fullness about the nasopharyngeal soft tissues and tongue base/lingual soft tissues.  Unclear if this is inflammatory versus other process. Saw Dr. Muller and had scope and completed antibiotic course. To follow up with MD 05/28/24. Had follow up with sleep medicine completed sleep study, told she also likely had sleep apnea. Waiting for final results.  mom recently diagnosed with breast cancer  MRI Abd: no suspicious liver lesions. several scattered T2 hyperintense foci representing cysts and hemangiomas. largest 9mm.  MRI Breast: scheduled 3/2024 at [facility] results above  DEXA: 3/2024 osteopenia  PET/CT 3/2024 as above  MRI L spine: 12/2022 L2-3 disc protrusion MRI brain negative 1/2024  GYN: Dr. Delio Jean Baptiste remains on daily ppx nitrofurantoin for UTI s/s  Colonoscopy: has follow up with Dr Perry, now left and another MD in practice. Looking for another provider.  PCP: Dr. Barrios  Neurologist: Dr. Lynn and has upcoming follow up  Rhuem: needs to make appt

## 2024-04-30 NOTE — ASSESSMENT
[FreeTextEntry1] : #Breast Cancer - s/p neoadjuvant TCHP followed by b/l mastectomy with node dissection negative for residual disease, pCR - s/p 12 doses of Herceptin/Perjeta - PET/CT 1/2021 negative for any evidence of metastases - Completed 1 year of Nerlynx 5/2021 - s/p bx in 11/2021 revealing fat necrosis - Started AI 2019 and switched to Letrozole with monthly Zoladex. She continues to experience persistent stable joint pains diffusely. Patient declines switching to another AI. - s/p CT CAP 12/16/22 no evidence of malignancy with subcentimeter liver lesions - s/p MRI breast 1/2023 BIRADS 2 - s/p MRI Abd 5/2023 no suspicious liver lesions. several scattered T2 hyperintense foci representing cysts and hemangiomas. Largest 9mm. Reviewed. - 6/6/23 vs and CBC reviewed; WBC 4.84, hgb 13.8, plt 245. Stable complaints on Letrozole. Continues to decline switching AI. Continue Letrozole and monthly Zoladex. Due for Zoladex today; proceed. Continue follow up with Dr. Combs, last seen 5/2023, note reviewed. No need for breast imaging. Continue clinical breast exams. - 7/11/23 vs and CBC reviewed; WBC 5.46, hgb 14.1, plt 284. Continue Letrozole and Zoladex. Due for Zoladex today. Patient presents with similar complaints of joint pains, hair loss stable and does not wish to switch AIs or take a break. - 8/8/23 - vs reviewed. labs drawn in office today. wbc 4.32, hgb 13.3, plt 261. continue zoladex and letrozole. complaining of L sided pulling, concerned about implant - will dw Dr. Santana and call Dr. Combs if needed. If it continues will check US of L breast to check on implant. no palpable abnormality noted. - 9/5/23 - vs and labs reviewed. labs drawn in office today. wbc 5.11, hgb 13.9, plts 284.Continue Letrozole and Zoladex. Due for Zoladex today. Patient presents with similar complaints of joint pains, hair loss stable and does not wish to switch AIs or take a break. - 10/10/23 vs and CBC reviewed; WBC 4.59, hgb 13.8, plt 264. Continues on Letrozole and monthly Zoladex. 1 week delayed for Zoladex. She continues to note same systemic complaints. Again discussed switching AIs or taking drug holiday. Patient hesitant and prefers to stay on Letrozole. Has US L breast implant 10/18/23. -11/7/23 - vs and labs reviewed. labs drawn in office today. continue letrozole, calcium and vit D. US reviewed - no evidence of abnormality. If breast pain continues will do MRI - 12/5/23 CBC reviewed; WBC 4.97, hgb 13.7, plt 268. Remains on Letrozole. Stable complaints and does not want to switch. Due for Zoladex today. s/p US L breast with persistent pain and now pain to R side. Will get bilateral MRI breast to assess implants  - 1/30/24 - vs and labs reviewed. cbc reviewed. rescheduled for breast MRI 2/15/24. MRI brain - Unremarkable exam. 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 due for DEXA 3/7/24 - 2/27/24 vs and CBC reivewed; CBC wnl. Continue on Letrozole and Zoladex. Due today. She is s/p MRI brain negative. Since last visit she had 3 skin bx, to back, axilla R, and L breast with derm Dr. Joon Clay path reveals L inframammary combined lentiginous and melanocytic nevus and pigmented SK, Lower back juntional melanocytic nevus similar to clarks nevus, R axilla lentiginous melanocytic nevus. Follow up with derm. She states they then got inected and she had 2 rounds of doxy. She then got COVID s/p paxlovid and then flu s/p tamiflu, prednisone and albuterol and stopped letrozole during that time. She is scheduled for MRI breast 3/2024. Slight pink tinge to L breast, no open wounds, does not appear to look cellulitic. However she did have a needle bx to inframmary fold, ensure no leak to breast implant. MRI ASAP. She is scheduled 3/14/24,  Tricia was tasked to call and get appt sooner  - 4/42/24 vs and CBC reviewed; WBC 6.67, hgb 14.3, plt 300. Continue on Letrozole and Zoladex. Due today.  s/p MRI breast 3/2024 revealing Status post bilateral mastectomy with silicone implant reconstruction with complex radial folds and no MRI evidence of intra or extracapsular implant rupture. No MRI evidence of malignancy. Any further management of the patient's complaints of pain and a palpable lump, including decision to biopsy, should be based on clinical assessment. Please note, the patient reports right rib pain. For evaluation of a rib abnormality, plain film, bone scan and if needed CAT scan can be performed for further evaluation as clinically warranted. She also has followed with ENT Dr. Muller many US of cervical LN and then had PET/CT 3/2024 revealing focal hypermetabolic corresponding to soft tissue fullness about the nasopharyngeal soft tissues and tongue base/lingual soft tissues.  Unclear if this is inflammatory versus other process. Recommend direct visualization tissue sampling if indicated. Seeing Dr. Muller today for possible scope.  4/24 - vs and labs reviewed. wbc, hgb and plts wnl. saw Dr. Muller - put on zpack. seeing again 5/24. scoped the last time she was with him. continue zoladex and letrozole.   #Nevi - Had 3 skin bx, to back, axilla R, and L breast with derm Dr. Joon Clay path reveals L inframammary combined lentiginous and melanocytic nevus and pigmented SK, Lower back junctional melanocytic nevus similar to clarks nevus, R axilla lentiginous melanocytic nevus. Follow up with derm. She states they then got infected and she had 2 rounds of doxy. She is scheduled for MRI breast 3/2024. Slight pink tinge to L breast, no open wounds, does not appear to look cellulitic. However she did have a needle bx to inframmary fold, ensure no leak to breast implant. MRI ASAP. She is scheduled 3/14/24,  Tricia was tasked to call and get appt sooner  - 4/2/24 Going  back to derm Dr. Clay for excision of back nevus 4/30/24 - continue to follow with derm  #Back pain - XR no suspicious lytic or blastic lesions - MRI L spine 12/7/22 revealing disc protrusion - Again advised follow up with Neurology. Has not done so  #Lymphedema - Re-advised will benefit from PT - Patient has not gone  #Bone Density - DEXA 3/2024 L spine T score -1.3, L femoral neck T score -0.2, L femur T score -0.4. R hip -0.3, R fem neck -0.6. Reviewed compared to 3/2022 minor decrease to bone density to L spine  - Again reviewed monitoring of DEXA q2y with AI use and risk of bone thinning - Continue ca++, vit D and weight bearing exercises. She has not started ca++. Advised 1200mg daily  #Joint pains - 2/2 AI or Zoladex. Patient declines switching AI. - Continue follow up with Rheum/Neuro/PCP - 6/6/23 stable. Patient does not wish to switch AI or introduce other medications. - 7/11/23 stable pains. Again offered to switch AI or take drug holiday. Patient does not wish to. - 8/8/23 - Continue Letrozole. stable pains - 10/10/23 as above, continues to note systemic complaints does not want to switch AI or take drug holiday - 11/7/23 - stable - 12/5/23 stable, does not want to switch AI  - 1/30/24 - stable, does not want to switch AI  - 2/27/24 stable and does not want to switch AI  - 4/2/24 stable and does not want to switch AI  4/30/24 -  stable and does not want to switch AI   #Fatigue - Likely 2/2 lingering s/s of recent viral symptoms and/or AI - Continue to monitor iron studies, B12/folate, TFTs  #Abd Pain/Liver Lesions - s/p CT CAP 12/2022 negative. Noted subcentimeter liver lesions. - Follow up MRI abd for assessment of liver lesions 5/2023 shows 9mm cyst and 2 hemangiomas.Otherwise, no liver lesions. LFT's/ ALK Phos normal. - 5/4/23 MRI abdomen ( [facility])- no suspicious liver lesions. several scattered T2 hyperintense foci representing cysts and hemangiomas. largest 9mm. consider repeat in 1 year to ensure stability. Due 5/2024.  - 4/2/24 of note did have PET/CT 3/2024 PET/CT 3/2024 revealing focal hypermetabolic corresponding to soft tissue fullness about the nasopharyngeal soft tissues and tongue base/lingual soft tissues.  Unclear if this is inflammatory versus other process. Recommend direct visualization tissue sampling if indicated. There was no hypermetabolic activity in chest abd or pelvis   #Intermittent HA - Endorses intermittent HA occipital and around eye - No associated neuro s/s - If persistent or worsens can consider MRI brain. Patient hesitant - Continue follow up with Neuro. Due for appt. Has not gone. - 12/5/23 again reviewed to follow up with neuro. HAs persistent and she states she is tracking them. Will order MRI Head - 1/30/24 - reviewed MRI brain from 1/22/24- Unremarkable exam. 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  #Diarrhea - Worse in morning - Patient attributes to Letrozole use - Imodium PRN - BRAT diet - s/p eval with Dr. Verdugo 5/24/23, note reviewed. Scheduled for CNY 10/2023. Celiac testing completed. - s/p CNY 10/2023 still needs to leave stool sample. Patient reports she was advised Dr. Verdugo will be leaving. Given names of other GIs in the practice  #Hair thinning - 2/2 AI use with Letrozole - Offered Minoxidil 2.5mg PO. Patient declines.  #Weight Gain - 2/2 AI use. Encouraged walking and exercise. Patient does not wish to switch AI.  #Recurrent Infections - IgG wnl.  # ?sleep apnea pending sleep study following with Dr. Narayanan  #Health Maintenance - UroGYN: Dr. Carranza continues on ppx nitrofurantoin on and off, currently off. Needs to make follow up. Also taking cranberry - CNY 10/2023 - needs to find GI - PCP Dr. Barrios. Continue follow up as instructed. - Neuro Dr. Cox. Continue follow up as instructed. Has not gone. Advised importance.  RTC in 1 month with next monthly Zoladex with CBC with diff, CMP, UA, irons, b12/folate, vit D, TSH, FT4, Again reiterated and Discussed with patient spacing out appts for RPA q3mos. Patient wishes to come monthly for RPA with monthly Zoladex injections.

## 2024-04-30 NOTE — REVIEW OF SYSTEMS
[Fatigue] : fatigue [Recent Change In Weight] : ~T recent weight change [Diarrhea: Grade 1 - Increase of <4 stools per day over baseline; mild increase in ostomy output compared to baseline] : Diarrhea: Grade 1 - Increase of <4 stools per day over baseline; mild increase in ostomy output compared to baseline [Joint Pain] : joint pain [Joint Stiffness] : joint stiffness [Muscle Pain] : muscle pain [Hot Flashes] : hot flashes [Negative] : Psychiatric [Palpitations] : no palpitations [Shortness Of Breath] : no shortness of breath [Wheezing] : no wheezing [Cough] : no cough [SOB on Exertion] : no shortness of breath during exertion [Abdominal Pain] : no abdominal pain [Anxiety] : no anxiety [FreeTextEntry2] : recently went for sleep study [FreeTextEntry7] : +persistent loose stools in the mornings takes Imodium s/p CNY  [FreeTextEntry8] : Taking prophylactic Macrobid for chronic UTI

## 2024-05-08 NOTE — REASON FOR VISIT
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  The cancer was ER positive, NE negative, and HER-2 3+ and she underwent neoadjuvant TCHP chemotherapy through Dr. Pacheco.  She had bilateral total mastectomies through a Wise reduction pattern on April 10, 2018 and had bilateral expander reconstructions with AlloDerm.  Her nodes were all negative after neoadjuvant chemotherapy.  She finished Herceptin and Perjeta and she did receive neratinib which finished in May 2021 and she had postmastectomy radiation and did develop some bouts of cellulitis and bilateral arm lymphedema.  She had her expanders exchanged for implants in 2019 and is currently on Zoladex and letrozole and is followed by Dr. Philippe, her medical oncologist.

## 2024-05-08 NOTE — PHYSICAL EXAM
[Normocephalic] : normocephalic [Atraumatic] : atraumatic [EOMI] : extra ocular movement intact [Supple] : supple [No Supraclavicular Adenopathy] : no supraclavicular adenopathy [No Cervical Adenopathy] : no cervical adenopathy [Examined in the supine and seated position] : examined in the supine and seated position [No dominant masses] : no dominant masses in right breast  [No dominant masses] : no dominant masses left breast [Breast Mass Right Breast ___cm] : no masses [Breast Mass Left Breast ___cm] : no masses [___cm] : ~M [unfilled] ~Ucm upper inner quadrant mass [No Axillary Lymphadenopathy] : no left axillary lymphadenopathy [No Rashes] : no rashes [No Ulceration] : no ulceration [de-identified] : On exam, the patient has obvious bilateral mastectomies with implant reconstructions with a Nielsen pattern incision.  Left breast is noticeably smaller than the right secondary to the previous radiation therapy.  I cannot feel any suspicious densities over either implant.  She has no supraclavicular, or cervical adenopathy.  Her left skin redness has improved and the previous medial density is no longer palpable. [de-identified] : Status post mastectomy and implant reconstruction with Wise pattern approach [de-identified] : Status post mastectomy and implant reconstruction with Wise pattern approach and later postmastectomy radiation.  Improved redness over upper aspect of the left implant with resolution of her previous medial breast density [de-identified] : Soft slightly enlarged right axillary lymph node [de-identified] : Stable upper extremity mild lymphedema

## 2024-05-08 NOTE — HISTORY OF PRESENT ILLNESS
[FreeTextEntry1] : The patient is a 48-year-old  still premenopausal white female of Luxembourgish, English, Sweedish, Chilean, Darlin, Chinese, and  descent.  She underwent menarche at age 12 and had her first child at age 32.  She has a strong family history of breast and ovarian cancer with her maternal great aunt who had ovarian cancer who passed away.  She has a maternal second cousin who had breast cancer in her late 40s.  Her paternal grandfather passed away from colorectal cancer at age 65.  Her maternal grandfather had colon cancer around age 75.  She has 4 maternal great uncles who had esophageal and throat cancer and a paternal great aunt who had uterine/cervical cancer.  A paternal great uncle had lung cancer.  The patient underwent bilateral augmentation implants in  requiring multiple replacements for ruptures last exchanged in .  She noticed a density towards the lateral aspect of the left breast and mammography showed suspicious left retroareolar calcifications.  Ultrasound showed multiple suspicious areas in the left breast 2:00, 3:00, as well as a suspicious left axillary lymph node.  Ultrasound-guided core biopsies on 2017 showed the left retroareolar density to be an intermediate grade invasive duct cancer with surrounding DCIS which was ER positive at 80%, NM negative, and HER-2 3+ by IHC.  A separate left breast 2:00 density 5 cm from the nipple turned out to be metastatic cancer in an intramammary lymph node which was ER/NM positive and HER-2 positive.  The left axillary suspicious lymph node was also positive.  She had clips placed on all the biopsied lesions as well as in a separate left breast 2:00 and 9:00 density which turned out to be a fibroadenoma and fibrocystic change.  An MRI performed on 2017 showed the right breast to be negative but the left breast showed extensive enhancement and PET scan showed no distant disease.  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  She underwent neoadjuvant TCHP chemotherapy through Dr. Pacheco.  MRI after neoadjuvant chemotherapy on 2018 showed no further enhancement.  I then performed bilateral total mastectomies through a Wise reduction pattern incision on April 10, 2018 and she had bilateral expander reconstructions with AlloDerm.  She had a sentinel lymph node biopsy with localization of the nodes preoperatively and both the intramammary as well as axillary lymph nodes were all negative.  She ended up with another 12 nodes removed which were negative since the localization was not perfectly placed.  The left mastectomy showed no residual cancer and the right mastectomy was negative.  She did undergo a small wound revision of the left breast by Dr. Nava on May 23, 2018.  She is following up with Dr. Pacheco and finished Herceptin and Perjeta and underwent postmastectomy radiation therapy at [facility] which finished on 2018.  She developed some redness in the left breast and was admitted to [facility] and placed on IV vancomycin for 3 days and the redness resolved.  She developed bilateral upper extremity lymphedema for which she wears compression sleeves.  She developed redness over the left implant and was placed on Levaquin in 2018 and the redness improved.  She again was placed on Cipro in 2019 for some cellulitis over the right implant.  She underwent fat grafting and removal of her port in 2019 and then had her implants removed and exchanged by Dr. Ye in 2019 for cosmesis.  She now follows up with Dr. Philippe since Dr. Holly Walker left and she is continuing on Zoladex as well as Aromasin and finished neratinib in May 2021.  She did have some neuropathy and follows up with Dr. Stephen.  She had a questionable lymph node felt in her lower right axilla in May 2021 but directed ultrasound just showed normal lymph nodes.  MRI on 2021 showed some enhancement in the upper inner aspect of the right breast but mammography and ultrasound on 2021 and follow-up MRI in 2022 just showed some areas of fat necrosis.  She comes in now for routine follow-up.

## 2024-05-08 NOTE — ASSESSMENT
[FreeTextEntry1] : The patient is a 48-year-old  still premenopausal white female of Estonian, English, Sweedish, Uruguayan, Darlin, Pashto, and  descent.  She underwent menarche at age 12 and had her first child at age 32.  She has a strong family history of breast and ovarian cancer with her maternal great aunt who had ovarian cancer who passed away.  She has a maternal second cousin who had breast cancer in her late 40s.  Her paternal grandfather passed away from colorectal cancer at age 65.  Her maternal grandfather had colon cancer around age 75.  She has 4 maternal great uncles who had esophageal and throat cancer and a paternal great aunt who had uterine/cervical cancer.  A paternal great uncle had lung cancer.  The patient underwent bilateral augmentation implants in  requiring multiple replacements for ruptures last exchanged in .  She noticed a density towards the lateral aspect of the left breast and mammography showed suspicious left retroareolar calcifications.  Ultrasound showed multiple suspicious areas in the left breast 2:00, 3:00, as well as a suspicious left axillary lymph node.  Ultrasound-guided core biopsies on 2017 showed the left retroareolar density to be an intermediate grade invasive duct cancer with surrounding DCIS which was ER positive at 80%, RI negative, and HER-2 3+ by IHC.  A separate left breast 2:00 density 5 cm from the nipple turned out to be metastatic cancer in an intramammary lymph node which was ER/RI positive and HER-2 positive.  The left axillary suspicious lymph node was also positive.  She had clips placed in all the biopsied lesions as well as in a separate left breast 2:00 and 9:00 density which turned out to be a fibroadenoma and fibrocystic change.  An MRI performed on 2017 showed the right breast to be negative but the left breast showed extensive enhancement and PET scan showed no distant disease.  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  She underwent neoadjuvant TCHP chemotherapy through Dr. Pacheco.  MRI after neoadjuvant chemotherapy on 2018 showed no further enhancement.  I then performed bilateral total mastectomies through a Wise reduction pattern incision on April 10, 2018 and she had bilateral expander reconstructions with AlloDerm.  She had a sentinel lymph node biopsy with localization of the nodes preoperatively and both the intramammary as well as axillary lymph nodes were all negative.  She ended up with another 12 nodes removed which were negative since the localization was not perfectly placed.  The left mastectomy showed no residual cancer and the right mastectomy was negative.  She did undergo a small wound revision of the left breast by Dr. Nava on May 23, 2018.  She is following up with Dr. Pacheco and finished Herceptin and Perjeta and underwent postmastectomy radiation therapy at [facility] which finished on 2018.  She developed some redness in the left breast and was admitted to [facility] and placed on IV vancomycin for 3 days and the redness resolved.  She developed bilateral upper extremity lymphedema for which she wears compression sleeves.  She developed redness over the left implant and was placed on Levaquin in 2018 and the redness improved.  She again was placed on Cipro in 2019 for some cellulitis over the right implant.  She underwent fat grafting and removal of her port in 2019 and then had her implants removed and exchanged by Dr. Ye in 2019 for cosmesis.  She now follows up with Dr. Philippe since Dr. Holly Walker has left and she is continuing on Zoladex as well as Aromasin and received neratinib which finished in May 2021.  She did have some neuropathy and follows up with Dr. Stephen.  On exam, I cannot feel any suspicious densities over either implant.  She has stable bilateral arm lymphedema.  There was a small density felt over the upper inner aspect of the left implant and I sent her for directed left breast ultrasound which was performed on 3/22/2021 showing no suspicious findings in that area.  Her redness has since improved and the density is no longer palpable.  She did have this soft palpable lymph node in the upper right axillary region but directed axillary ultrasound on May 7, 2021 only showed normal axillary lymph nodes.  She did have her 2nd COVID vaccination in 2021.  The patient then underwent a bilateral breast MRI ordered by medical oncology on 2021 which showed some enhancement in the upper inner aspect of the right breast and she was sent for targeted mammography and ultrasound of the right breast on 2021 just showing some developing fat necrosis which was eventually biopsied under ultrasound guidance on 2021 and pathology showed fat necrosis.  A follow-up diagnostic MRI and right breast mammography was performed on 2022 and the mammography just showed the "coil" clip in the right breast around a previously biopsied benign density and the MRI showed no suspicious findings.  She also had a follow-up diagnostic right breast ultrasound on 2022 performed here [facility] just showing this right breast 2:00 previously biopsied area of fat necrosis to have decreased in size.  Her last bilateral breast MRI was performed on 2023 which was reviewed from [facility] which showed no suspicious areas of enhancement.  Otherwise, she should remain on Zoladex and Letrozole, through Dr. Philippe.  I would like to see her again in 1 year in May 2025 and she no longer requires routine diagnostic imaging given her bilateral mastectomies.

## 2024-05-13 ENCOUNTER — APPOINTMENT (OUTPATIENT)
Dept: BREAST CENTER | Facility: CLINIC | Age: 49
End: 2024-05-13
Payer: COMMERCIAL

## 2024-05-13 VITALS
HEIGHT: 63 IN | HEART RATE: 89 BPM | DIASTOLIC BLOOD PRESSURE: 117 MMHG | WEIGHT: 205 LBS | BODY MASS INDEX: 36.32 KG/M2 | OXYGEN SATURATION: 97 % | SYSTOLIC BLOOD PRESSURE: 158 MMHG

## 2024-05-13 DIAGNOSIS — Z12.39 ENCOUNTER FOR OTHER SCREENING FOR MALIGNANT NEOPLASM OF BREAST: ICD-10-CM

## 2024-05-13 DIAGNOSIS — Z85.3 PERSONAL HISTORY OF MALIGNANT NEOPLASM OF BREAST: ICD-10-CM

## 2024-05-13 DIAGNOSIS — Z80.3 FAMILY HISTORY OF MALIGNANT NEOPLASM OF BREAST: ICD-10-CM

## 2024-05-13 PROCEDURE — 99213 OFFICE O/P EST LOW 20 MIN: CPT

## 2024-05-13 NOTE — HISTORY OF PRESENT ILLNESS
[FreeTextEntry1] : The patient is a 48-year-old  still premenopausal white female of Romansh, English, Sweedish, Burkinan, Darlin, Welsh, and  descent.  She underwent menarche at age 12 and had her first child at age 32.  She has a strong family history of breast and ovarian cancer with her maternal great aunt who had ovarian cancer who passed away.  She has a maternal second cousin who had breast cancer in her late 40s.  Her paternal grandfather passed away from colorectal cancer at age 65.  Her maternal grandfather had colon cancer around age 75.  She has 4 maternal great uncles who had esophageal and throat cancer and a paternal great aunt who had uterine/cervical cancer.  A paternal great uncle had lung cancer.  Her mother developed breast cancer at age 75.  The patient underwent bilateral augmentation implants in  requiring multiple replacements for ruptures last exchanged in .  She noticed a density towards the lateral aspect of the left breast and mammography showed suspicious left retroareolar calcifications.  Ultrasound showed multiple suspicious areas in the left breast 2:00, 3:00, as well as a suspicious left axillary lymph node.  Ultrasound-guided core biopsies on 2017 showed the left retroareolar density to be an intermediate grade invasive duct cancer with surrounding DCIS which was ER positive at 80%, TN negative, and HER-2 3+ by IHC.  A separate left breast 2:00 density 5 cm from the nipple turned out to be metastatic cancer in an intramammary lymph node which was ER/TN positive and HER-2 positive.  The left axillary suspicious lymph node was also positive.  She had clips placed on all the biopsied lesions as well as in a separate left breast 2:00 and 9:00 density which turned out to be a fibroadenoma and fibrocystic change.  An MRI performed on 2017 showed the right breast to be negative but the left breast showed extensive enhancement and PET scan showed no distant disease.  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  She underwent neoadjuvant TCHP chemotherapy through Dr. Pacheco.  MRI after neoadjuvant chemotherapy on 2018 showed no further enhancement.  I then performed bilateral total mastectomies through a Wise reduction pattern incision on April 10, 2018 and she had bilateral expander reconstructions with AlloDerm.  She had a sentinel lymph node biopsy with localization of the nodes preoperatively and both the intramammary as well as axillary lymph nodes were all negative.  She ended up with another 12 nodes removed which were negative since the localization was not perfectly placed.  The left mastectomy showed no residual cancer and the right mastectomy was negative.  She did undergo a small wound revision of the left breast by Dr. Nava on May 23, 2018.  She is following up with Dr. Pacheco and finished Herceptin and Perjeta and underwent postmastectomy radiation therapy at [facility] which finished on 2018.  She developed some redness in the left breast and was admitted to [place] and placed on IV vancomycin for 3 days and the redness resolved.  She developed bilateral upper extremity lymphedema for which she wears compression sleeves.  She developed redness over the left implant and was placed on Levaquin in 2018 and the redness improved.  She again was placed on Cipro in 2019 for some cellulitis over the right implant.  She underwent fat grafting and removal of her port in 2019 and then had her implants removed and exchanged by Dr. Ye in 2019 for cosmesis.  She now follows up with Dr. Philippe since Dr. Holly Walker left and she is continuing on Zoladex as well as Aromasin and finished neratinib in May 2021.  She did have some neuropathy and follows up with Dr. Stephen.  She had a questionable lymph node felt in her lower right axilla in May 2021 but directed ultrasound just showed normal lymph nodes.  MRI on 2021 showed some enhancement in the upper inner aspect of the right breast but mammography and ultrasound on 2021 and follow-up MRI in 2022 just showed some areas of fat necrosis.  She comes in now for routine follow-up.

## 2024-05-13 NOTE — PHYSICAL EXAM
[Normocephalic] : normocephalic [Atraumatic] : atraumatic [EOMI] : extra ocular movement intact [Supple] : supple [No Supraclavicular Adenopathy] : no supraclavicular adenopathy [No Cervical Adenopathy] : no cervical adenopathy [Examined in the supine and seated position] : examined in the supine and seated position [No dominant masses] : no dominant masses in right breast  [No dominant masses] : no dominant masses left breast [Breast Mass Right Breast ___cm] : no masses [Breast Mass Left Breast ___cm] : no masses [___cm] : ~M [unfilled] ~Ucm upper inner quadrant mass [No Axillary Lymphadenopathy] : no left axillary lymphadenopathy [No Rashes] : no rashes [No Ulceration] : no ulceration [de-identified] : On exam, the patient has obvious bilateral mastectomies with implant reconstructions with a Nielsen pattern incision.  Left breast is noticeably smaller than the right secondary to the previous radiation therapy.  I cannot feel any suspicious densities over either implant.  She has no supraclavicular, or cervical adenopathy.  Her left skin redness has improved and the previous medial density is no longer palpable. [de-identified] : Status post mastectomy and implant reconstruction with Wise pattern approach [de-identified] : Status post mastectomy and implant reconstruction with Wise pattern approach and later postmastectomy radiation.  Improved redness over upper aspect of the left implant with resolution of her previous medial breast density [de-identified] : Soft slightly enlarged right axillary lymph node [de-identified] : Stable upper extremity mild lymphedema

## 2024-05-13 NOTE — REASON FOR VISIT
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  The cancer was ER positive, LA negative, and HER-2 3+ and she underwent neoadjuvant TCHP chemotherapy through Dr. Pacheco.  She had bilateral total mastectomies through a Wise reduction pattern on April 10, 2018 and had bilateral expander reconstructions with AlloDerm.  Her nodes were all negative after neoadjuvant chemotherapy.  She finished Herceptin and Perjeta and she did receive neratinib which finished in May 2021 and she had postmastectomy radiation and did develop some bouts of cellulitis and bilateral arm lymphedema.  She had her expanders exchanged for implants in 2019 and is currently on Zoladex and letrozole and is followed by Dr. Philippe, her medical oncologist.

## 2024-05-13 NOTE — ASSESSMENT
[FreeTextEntry1] : The patient is a 48-year-old  still premenopausal white female of Azeri, English, Sweedish, Colombian, Darlin, Bulgarian, and  descent.  She underwent menarche at age 12 and had her first child at age 32.  She has a strong family history of breast and ovarian cancer with her maternal great aunt who had ovarian cancer who passed away.  She has a maternal second cousin who had breast cancer in her late 40s.  Her paternal grandfather passed away from colorectal cancer at age 65.  Her maternal grandfather had colon cancer around age 75.  She has 4 maternal great uncles who had esophageal and throat cancer and a paternal great aunt who had uterine/cervical cancer.  A paternal great uncle had lung cancer.  Her mother developed a breast cancer at age 75.  The patient underwent bilateral augmentation implants in  requiring multiple replacements for ruptures last exchanged in .  She noticed a density towards the lateral aspect of the left breast and mammography showed suspicious left retroareolar calcifications.  Ultrasound showed multiple suspicious areas in the left breast 2:00, 3:00, as well as a suspicious left axillary lymph node.  Ultrasound-guided core biopsies on 2017 showed the left retroareolar density to be an intermediate grade invasive duct cancer with surrounding DCIS which was ER positive at 80%, WY negative, and HER-2 3+ by IHC.  A separate left breast 2:00 density 5 cm from the nipple turned out to be metastatic cancer in an intramammary lymph node which was ER/WY positive and HER-2 positive.  The left axillary suspicious lymph node was also positive.  She had clips placed in all the biopsied lesions as well as in a separate left breast 2:00 and 9:00 density which turned out to be a fibroadenoma and fibrocystic change.  An MRI performed on 2017 showed the right breast to be negative but the left breast showed extensive enhancement and PET scan showed no distant disease.  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  She underwent neoadjuvant TCHP chemotherapy through Dr. Pacheco.  MRI after neoadjuvant chemotherapy on 2018 showed no further enhancement.  I then performed bilateral total mastectomies through a Wise reduction pattern incision on April 10, 2018 and she had bilateral expander reconstructions with AlloDerm.  She had a sentinel lymph node biopsy with localization of the nodes preoperatively and both the intramammary as well as axillary lymph nodes were all negative.  She ended up with another 12 nodes removed which were negative since the localization was not perfectly placed.  The left mastectomy showed no residual cancer and the right mastectomy was negative.  She did undergo a small wound revision of the left breast by Dr. Nava on May 23, 2018.  She is following up with Dr. Pacheco and finished Herceptin and Perjeta and underwent postmastectomy radiation therapy at [facility] which finished on 2018.  She developed some redness in the left breast and was admitted to [place] and placed on IV vancomycin for 3 days and the redness resolved.  She developed bilateral upper extremity lymphedema for which she wears compression sleeves.  She developed redness over the left implant and was placed on Levaquin in 2018 and the redness improved.  She again was placed on Cipro in 2019 for some cellulitis over the right implant.  She underwent fat grafting and removal of her port in 2019 and then had her implants removed and exchanged by Dr. Ye in 2019 for cosmesis.  She now follows up with Dr. Philippe since Dr. Holly Walker has left and she is continuing on Zoladex as well as Aromasin and received neratinib which finished in May 2021.  She did have some neuropathy and follows up with Dr. Stephen. There was a small density felt over the upper inner aspect of the left implant and I sent her for directed left breast ultrasound which was performed on 3/22/2021 showing no suspicious findings in that area. She did have this soft palpable lymph node in the upper right axillary region but directed axillary ultrasound on May 7, 2021 only showed normal axillary lymph nodes.  She did have her 2nd COVID vaccination in 2021.  The patient then underwent a bilateral breast MRI ordered by medical oncology on 2021 which showed some enhancement in the upper inner aspect of the right breast and she was sent for targeted mammography and ultrasound of the right breast on 2021 just showing some developing fat necrosis which was eventually biopsied under ultrasound guidance on 2021 and pathology showed fat necrosis.  A follow-up diagnostic MRI and right breast mammography was performed on 2022 and the mammography just showed the "coil" clip in the right breast around a previously biopsied benign density and the MRI showed no suspicious findings.  She also had a follow-up diagnostic right breast ultrasound on 2022 performed here [facility] just showing this right breast 2:00 previously biopsied area of fat necrosis to have decreased in size.  Her last bilateral breast MRI was performed on 2023 which was reviewed from [facility] which showed no suspicious areas of enhancement.  She did undergo a PET CT scan performed by her ENT surgeon on 2024 performed at [facility] which showed some nasopharyngeal uptake which is being managed by her ENT, Dr. Muller.  On exam, I cannot feel any suspicious densities over either implant.  She has stable bilateral arm lymphedema. Otherwise, she should remain on Zoladex and Letrozole, through Dr. Philippe.  I would like to see her again in 1 year in May 2025 and she no longer requires routine diagnostic imaging given her bilateral mastectomies.

## 2024-05-28 ENCOUNTER — LABORATORY RESULT (OUTPATIENT)
Age: 49
End: 2024-05-28

## 2024-05-28 ENCOUNTER — RESULT REVIEW (OUTPATIENT)
Age: 49
End: 2024-05-28

## 2024-05-28 ENCOUNTER — APPOINTMENT (OUTPATIENT)
Dept: HEMATOLOGY ONCOLOGY | Facility: CLINIC | Age: 49
End: 2024-05-28
Payer: COMMERCIAL

## 2024-05-28 VITALS
BODY MASS INDEX: 36.32 KG/M2 | HEIGHT: 63 IN | HEART RATE: 83 BPM | SYSTOLIC BLOOD PRESSURE: 157 MMHG | DIASTOLIC BLOOD PRESSURE: 97 MMHG | RESPIRATION RATE: 18 BRPM | WEIGHT: 205 LBS | OXYGEN SATURATION: 98 % | TEMPERATURE: 98.6 F

## 2024-05-28 PROCEDURE — 99215 OFFICE O/P EST HI 40 MIN: CPT

## 2024-06-09 NOTE — REVIEW OF SYSTEMS
[Fatigue] : fatigue [Recent Change In Weight] : ~T recent weight change [Diarrhea: Grade 1 - Increase of <4 stools per day over baseline; mild increase in ostomy output compared to baseline] : Diarrhea: Grade 1 - Increase of <4 stools per day over baseline; mild increase in ostomy output compared to baseline [Joint Pain] : joint pain [Joint Stiffness] : joint stiffness [Muscle Pain] : muscle pain [Hot Flashes] : hot flashes [Negative] : Allergic/Immunologic [Palpitations] : no palpitations [Shortness Of Breath] : no shortness of breath [Wheezing] : no wheezing [Cough] : no cough [SOB on Exertion] : no shortness of breath during exertion [Abdominal Pain] : no abdominal pain [Anxiety] : no anxiety [FreeTextEntry2] : recently went for sleep study [FreeTextEntry7] : +persistent loose stools in the mornings takes Imodium s/p CNY  [FreeTextEntry8] : Taking prophylactic Macrobid for chronic UTI

## 2024-06-09 NOTE — PHYSICAL EXAM
[Fully active, able to carry on all pre-disease performance without restriction] : Status 0 - Fully active, able to carry on all pre-disease performance without restriction [Normal] : affect appropriate [de-identified] : larger neck girth, no adenopathy appreciated  [de-identified] : b/l mastectomy with silicone implants symmetrical, no obvious masses or lesions, no skin dimpling, no axillary adenopathy, +sensitivity and tenderness to palpation of L side, slight pink tinge to L breast

## 2024-06-09 NOTE — ASSESSMENT
[FreeTextEntry1] : #Breast Cancer - s/p neoadjuvant TCHP followed by b/l mastectomy with node dissection negative for residual disease, pCR - s/p 12 doses of Herceptin/Perjeta - PET/CT 1/2021 negative for any evidence of metastases - Completed 1 year of Nerlynx 5/2021 - s/p bx in 11/2021 revealing fat necrosis - Started AI 2019 and switched to Letrozole with monthly Zoladex. She continues to experience persistent stable joint pains diffusely. Patient declines switching to another AI. - s/p CT CAP 12/16/22 no evidence of malignancy with subcentimeter liver lesions - s/p MRI breast 1/2023 BIRADS 2 - s/p MRI Abd 5/2023 no suspicious liver lesions. several scattered T2 hyperintense foci representing cysts and hemangiomas. Largest 9mm. Reviewed. - 6/6/23 vs and CBC reviewed; WBC 4.84, hgb 13.8, plt 245. Stable complaints on Letrozole. Continues to decline switching AI. Continue Letrozole and monthly Zoladex. Due for Zoladex today; proceed. Continue follow up with Dr. Combs, last seen 5/2023, note reviewed. No need for breast imaging. Continue clinical breast exams. - 7/11/23 vs and CBC reviewed; WBC 5.46, hgb 14.1, plt 284. Continue Letrozole and Zoladex. Due for Zoladex today. Patient presents with similar complaints of joint pains, hair loss stable and does not wish to switch AIs or take a break. - 8/8/23 - vs reviewed. labs drawn in office today. wbc 4.32, hgb 13.3, plt 261. continue zoladex and letrozole. complaining of L sided pulling, concerned about implant - will dw Dr. Santana and call Dr. Combs if needed. If it continues will check US of L breast to check on implant. no palpable abnormality noted. - 9/5/23 - vs and labs reviewed. labs drawn in office today. wbc 5.11, hgb 13.9, plts 284.Continue Letrozole and Zoladex. Due for Zoladex today. Patient presents with similar complaints of joint pains, hair loss stable and does not wish to switch AIs or take a break. - 10/10/23 vs and CBC reviewed; WBC 4.59, hgb 13.8, plt 264. Continues on Letrozole and monthly Zoladex. 1 week delayed for Zoladex. She continues to note same systemic complaints. Again discussed switching AIs or taking drug holiday. Patient hesitant and prefers to stay on Letrozole. Has US L breast implant 10/18/23. -11/7/23 - vs and labs reviewed. labs drawn in office today. continue letrozole, calcium and vit D. US reviewed - no evidence of abnormality. If breast pain continues will do MRI - 12/5/23 CBC reviewed; WBC 4.97, hgb 13.7, plt 268. Remains on Letrozole. Stable complaints and does not want to switch. Due for Zoladex today. s/p US L breast with persistent pain and now pain to R side. Will get bilateral MRI breast to assess implants  - 1/30/24 - vs and labs reviewed. cbc reviewed. rescheduled for breast MRI 2/15/24. MRI brain - Unremarkable exam. 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 due for DEXA 3/7/24 - 2/27/24 vs and CBC reivewed; CBC wnl. Continue on Letrozole and Zoladex. Due today. She is s/p MRI brain negative. Since last visit she had 3 skin bx, to back, axilla R, and L breast with derm Dr. Joon Clay path reveals L inframammary combined lentiginous and melanocytic nevus and pigmented SK, Lower back juntional melanocytic nevus similar to clarks nevus, R axilla lentiginous melanocytic nevus. Follow up with derm. She states they then got inected and she had 2 rounds of doxy. She then got COVID s/p paxlovid and then flu s/p tamiflu, prednisone and albuterol and stopped letrozole during that time. She is scheduled for MRI breast 3/2024. Slight pink tinge to L breast, no open wounds, does not appear to look cellulitic. However she did have a needle bx to inframmary fold, ensure no leak to breast implant. MRI ASAP. She is scheduled 3/14/24,  Tricia was tasked to call and get appt sooner  - 4/42/24 vs and CBC reviewed; WBC 6.67, hgb 14.3, plt 300. Continue on Letrozole and Zoladex. Due today.  s/p MRI breast 3/2024 revealing Status post bilateral mastectomy with silicone implant reconstruction with complex radial folds and no MRI evidence of intra or extracapsular implant rupture. No MRI evidence of malignancy. Any further management of the patient's complaints of pain and a palpable lump, including decision to biopsy, should be based on clinical assessment. Please note, the patient reports right rib pain. For evaluation of a rib abnormality, plain film, bone scan and if needed CAT scan can be performed for further evaluation as clinically warranted. She also has followed with ENT Dr. Muller many US of cervical LN and then had PET/CT 3/2024 revealing focal hypermetabolic corresponding to soft tissue fullness about the nasopharyngeal soft tissues and tongue base/lingual soft tissues.  Unclear if this is inflammatory versus other process. Recommend direct visualization tissue sampling if indicated. Seeing Dr. Muller today for possible scope.  - 4/24 - vs and labs reviewed. wbc, hgb and plts wnl. saw Dr. Muller - put on zpack. seeing again 5/24. scoped the last time she was with him. continue zoladex and letrozole.  - 5/28/24 vs and CBC reviewed. Remains on Letrozole and Zoladex. Due today. s/p follow up with Dr. Combs s/p MRI breast notes reviewed and imaging reviewed. States after she saw Dr. Combs noticed "bubble/bump" to R 6:00 breast implant. On PE appears to be fold in implant and edge of implant. Will continue to monitor. All imaging negative. She is seeing us monthly per her preference.   #Nevi - Had 3 skin bx, to back, axilla R, and L breast with derm Dr. Joon Clay path reveals L inframammary combined lentiginous and melanocytic nevus and pigmented SK, Lower back junctional melanocytic nevus similar to clarks nevus, R axilla lentiginous melanocytic nevus. Follow up with derm. She states they then got infected and she had 2 rounds of doxy. She is scheduled for MRI breast 3/2024. Slight pink tinge to L breast, no open wounds, does not appear to look cellulitic. However she did have a needle bx to inframmary fold, ensure no leak to breast implant. MRI ASAP. She is scheduled 3/14/24,  Tricia was tasked to call and get appt sooner  - 4/2/24 Going  back to derm Dr. Clay for excision of back nevus - 4/30/24 - continue to follow with derm - 5/28/24 was told repeat was negative   #Back pain - XR no suspicious lytic or blastic lesions - MRI L spine 12/7/22 revealing disc protrusion - Again advised follow up with Neurology. Has not done so  #Lymphedema - Re-advised will benefit from PT - Patient has not gone  #Bone Density - DEXA 3/2024 L spine T score -1.3, L femoral neck T score -0.2, L femur T score -0.4. R hip -0.3, R fem neck -0.6. Reviewed compared to 3/2022 minor decrease to bone density to L spine  - Again reviewed monitoring of DEXA q2y with AI use and risk of bone thinning - Continue ca++, vit D and weight bearing exercises. She has not started ca++. Advised 1200mg daily  #Joint pains - 2/2 AI or Zoladex. Patient declines switching AI. - Continue follow up with Rheum/Neuro/PCP - 6/6/23 stable. Patient does not wish to switch AI or introduce other medications. - 7/11/23 stable pains. Again offered to switch AI or take drug holiday. Patient does not wish to. - 8/8/23 - Continue Letrozole. stable pains - 10/10/23 as above, continues to note systemic complaints does not want to switch AI or take drug holiday - 11/7/23 - stable - 12/5/23 stable, does not want to switch AI  - 1/30/24 - stable, does not want to switch AI  - 2/27/24 stable and does not want to switch AI  - 4/2/24 stable and does not want to switch AI  - 4/30/24 -  stable and does not want to switch AI  - 5/28/24 stable does not want to switch AI   #Fatigue - Likely 2/2 lingering s/s of recent viral symptoms and/or AI - Continue to monitor iron studies, B12/folate, TFTs  #Abd Pain/Liver Lesions - s/p CT CAP 12/2022 negative. Noted subcentimeter liver lesions. - Follow up MRI abd for assessment of liver lesions 5/2023 shows 9mm cyst and 2 hemangiomas.Otherwise, no liver lesions. LFT's/ ALK Phos normal. - 5/4/23 MRI abdomen ( [facility])- no suspicious liver lesions. several scattered T2 hyperintense foci representing cysts and hemangiomas. largest 9mm. consider repeat in 1 year to ensure stability. Due 5/2024.  - 4/2/24 of note did have PET/CT 3/2024 PET/CT 3/2024 revealing focal hypermetabolic corresponding to soft tissue fullness about the nasopharyngeal soft tissues and tongue base/lingual soft tissues.  Unclear if this is inflammatory versus other process. Recommend direct visualization tissue sampling if indicated. There was no hypermetabolic activity in chest abd or pelvis  - 5/28/24 last MRI abd 5/2023. Considr 1 yr repeat. Did have recent pet/CT no activity abd   #Intermittent HA - Endorses intermittent HA occipital and around eye - No associated neuro s/s - If persistent or worsens can consider MRI brain. Patient hesitant - Continue follow up with Neuro. Due for appt. Has not gone. - 12/5/23 again reviewed to follow up with neuro. HAs persistent and she states she is tracking them. Will order MRI Head - 1/30/24 - reviewed MRI brain from 1/22/24- Unremarkable exam. 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 - 5/28/24 seeing neuro 7/2024   #Diarrhea - Worse in morning - Patient attributes to Letrozole use - Imodium PRN - BRAT diet - s/p eval with Dr. Verdugo 5/24/23, note reviewed. Scheduled for CNY 10/2023. Celiac testing completed. - s/p CNY 10/2023 still needs to leave stool sample. Patient reports she was advised Dr. Verdugo will be leaving. Given names of other GIs in the practice - still needs to follow with another GI   #Hair thinning - 2/2 AI use with Letrozole - Offered Minoxidil 2.5mg PO. Patient declines.  #Weight Gain - 2/2 AI use. Encouraged walking and exercise. Patient does not wish to switch AI.  #Recurrent Infections - IgG wnl.  #Sleep Apnea - s/p sleep study confirmed sleep apnea, discussing CPAP  - follows with Dr Richard Fernandez   #Health Maintenance - UroGYN: Dr. Carranza continues on ppx nitrofurantoin on and off, currently off. Needs to make follow up. Also taking cranberry - CNY 10/2023 - needs to find GI - PCP Dr. Barrios. Continue follow up as instructed. - Neuro Dr. Cox. appt 7/2024  - Dr. Muller ENT has repeat scope scheduled today s/p abx and s/p eval with PET as above   RTC in 1 month with next monthly Zoladex with CBC with diff, CMP, UA, irons, b12/folate, vit D, TSH, FT4, Again reiterated and Discussed with patient spacing out appts for RPA q3mos. Patient wishes to come monthly for RPA with monthly Zoladex injections.  Case and mnagement discussed with Dr. desai

## 2024-06-09 NOTE — HISTORY OF PRESENT ILLNESS
[de-identified] : Ms Jonas is a 47 year old woman who was diagnosed with a pT1cN1 stage 2a , ER + [place]+ HER 2 nicole positive breast cancer in October 2017, after self palpating a lump in the left. She had breast imaging at [facility] in [place]. Left breast biopsy was done at [facility] [place] [facility].  She had neoadjuvant chemotherapy with Dr Pacheco, 11/2017 [facility] had pCR  followed by a year completion of HP.  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 On b12 shots .  Bilateral mastectomies with reconstruction with Drew at [facility] which revealed no evidence of residual disease. She underwent post-operative radiation therapy with Dr Purdy at [facility].  She underwent menopause during chemotherapy, and then menses resumed 2019.  She then started ovarian suppression with Zoladex in March of 2019. She  then began exemestane in 2019.   Hx of chest pain - [facility]   Began Nerlynx in January of 2020 .  + diarrhea, intermittent toe infections.  She had several breaks in Nerlyx treatments because of infection and respiratory illness.  Tested negative for COVID. Total of 13 weeks of breaks from Nerlynx.  She believes due to complete late April 2021. currently on 4 pills a day   There have been several post operative infections and complication requiring multiple reconstructive surgeries.  Chronic lymphedema of both arms, also has had several episodes of bilateral chest wall cellullitis. hx of uti on spressive nitrofuriton.  hx of high bp   She is here to transfer care.   Sees rheumatology. a lot of joint pains.   PETCT negative for any mets or lesions jan 2021   saw Dr. Combs- did R axilla US due to palpable lymph node ( was not sure if this was r/t J&J shot)  less gi symptoms now off nerlynx( may 2021)    mri breast mammo 1/22/22 [de-identified] : Patient seen and examined today for follow up for the management of history of breast cancer. She remains on Letrozole and Zoladex. Due today. She is s/p MRI brain negative. Patient's mother recently diagnosed with breast CA.   She also has followed with ENT Dr. Muller many US of cervical LN and then had PET/CT 3/2024 revealing focal hypermetabolic corresponding to soft tissue fullness about the nasopharyngeal soft tissues and tongue base/lingual soft tissues.  Unclear if this is inflammatory versus other process. Saw Dr. Muller and had scope and completed antibiotic course. THas follow up today for repeat scope after this appt. She is s/p follow up with Dr. Combs 5/2024. After noticed some bulging to the R lower implant. Did have sleep study +[facility] and discussing CPAP. On and off nitrofurantoin. no UTI s/s currently. +allergies. Seeing neuro 7/2024.   MRI Abd: no suspicious liver lesions. several scattered T2 hyperintense foci representing cysts and hemangiomas. largest 9mm.  MRI Breast: scheduled 3/2024 at [facility] results above  DEXA: 3/2024 osteopenia  PET/CT 3/2024 as above  MRI L spine: 12/2022 L2-3 disc protrusion MRI brain negative 1/2024  GYN: Dr. Delio Jean Baptiste remains on daily ppx nitrofurantoin for UTI s/s  Colonoscopy: has follow up with Dr Perry, now left and another MD in practice. Looking for another provider.  PCP: Dr. Barrios  Neurologist: Dr. Lynn and has upcoming follow up  Rhuem: needs to make appt

## 2024-06-27 ENCOUNTER — RESULT REVIEW (OUTPATIENT)
Age: 49
End: 2024-06-27

## 2024-06-27 ENCOUNTER — LABORATORY RESULT (OUTPATIENT)
Age: 49
End: 2024-06-27

## 2024-06-27 ENCOUNTER — APPOINTMENT (OUTPATIENT)
Dept: HEMATOLOGY ONCOLOGY | Facility: CLINIC | Age: 49
End: 2024-06-27
Payer: COMMERCIAL

## 2024-06-27 VITALS
BODY MASS INDEX: 36.68 KG/M2 | RESPIRATION RATE: 18 BRPM | DIASTOLIC BLOOD PRESSURE: 92 MMHG | SYSTOLIC BLOOD PRESSURE: 143 MMHG | OXYGEN SATURATION: 98 % | TEMPERATURE: 99.4 F | HEIGHT: 63 IN | WEIGHT: 207 LBS | HEART RATE: 80 BPM

## 2024-06-27 DIAGNOSIS — M06.9 RHEUMATOID ARTHRITIS, UNSPECIFIED: ICD-10-CM

## 2024-06-27 DIAGNOSIS — Z90.13 ACQUIRED ABSENCE OF BILATERAL BREASTS AND NIPPLES: ICD-10-CM

## 2024-06-27 DIAGNOSIS — C77.3 MALIGNANT NEOPLASM OF UNSPECIFIED SITE OF LEFT FEMALE BREAST: ICD-10-CM

## 2024-06-27 DIAGNOSIS — C50.912 MALIGNANT NEOPLASM OF UNSPECIFIED SITE OF LEFT FEMALE BREAST: ICD-10-CM

## 2024-06-27 PROCEDURE — 99214 OFFICE O/P EST MOD 30 MIN: CPT

## 2024-06-27 NOTE — HISTORY OF PRESENT ILLNESS
[de-identified] : Ms Jonas is a 47 year old woman who was diagnosed with a pT1cN1 stage 2a , ER + ID+ HER 2 nicole positive breast cancer in October 2017, after self palpating a lump in the left. She had breast imaging at [facility] in [place]. Left breast biopsy was done at [facility] in [facility]  She had neoadjuvant chemotherapy with Dr Pacheco, 11/2017 [facility] had pCR  followed by a year completion of HP.  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 On b12 shots .  Bilateral mastectomies with reconstruction with Drew at [facility] which revealed no evidence of residual disease. She underwent post-operative radiation therapy with Dr Purdy at [facility].  She underwent menopause during chemotherapy, and then menses resumed 2019.  She then started ovarian suppression with Zoladex in March of 2019. She  then began exemestane in 2019.   Hx of chest pain - [facility]   Began Nerlynx in January of 2020 .  + diarrhea, intermittent toe infections.  She had several breaks in Nerlyx treatments because of infection and respiratory illness.  Tested negative for COVID. Total of 13 weeks of breaks from Nerlynx.  She believes due to complete late April 2021. currently on 4 pills a day   There have been several post operative infections and complication requiring multiple reconstructive surgeries.  Chronic lymphedema of both arms, also has had several episodes of bilateral chest wall cellullitis. hx of uti on spressive nitrofuriton.  hx of high bp   She is here to transfer care.   Sees rheumatology. a lot of joint pains.   PETCT negative for any mets or lesions jan 2021   saw Dr. Combs- did R axilla US due to palpable lymph node ( was not sure if this was r/t J&J shot)  less gi symptoms now off nerlynx( may 2021)    mri breast mammo 1/22/22 [de-identified] : Patient seen and examined today for follow up for the management of history of breast cancer.  She remains on Letrozole and Zoladex. Patient continues to complain of diarrhea. Patient feels like her ROM in her neck has decreased. Complains of increased stiffness to L neck. Denies difficulty swallowing but feels muscles tighten when she is swallowing. Patient recently started on CPAP machine. Going to doctor today for follow up to adjust mask and settings.  Appt with neurologist 7/2024.  She is s/p follow up with Dr. Combs 5/2024.  She also has followed with ENT Dr. Muller many US of cervical LN and then had PET/CT 3/2024 revealing focal hypermetabolic corresponding to soft tissue fullness about the nasopharyngeal soft tissues and tongue base/lingual soft tissues.  Unclear if this is inflammatory versus other process. Saw Dr. Muller and had scope and completed antibiotic course.Had repeat scope and recommend Nasonex. Follow up october   MRI Abd: no suspicious liver lesions. several scattered T2 hyperintense foci representing cysts and hemangiomas. largest 9mm.  MRI Breast: scheduled 3/2024 at [facility] results above  DEXA: 3/2024 osteopenia  PET/CT 3/2024 as above  GYN: Dr. Kebede Uro GYN remains on daily ppx nitrofurantoin for UTI s/s  Colonoscopy: has follow up with Dr Perry, now left and another MD in practice. Looking for another provider.  PCP: Dr. Barrios  Rheum: needs to make appt

## 2024-06-27 NOTE — PHYSICAL EXAM
[Fully active, able to carry on all pre-disease performance without restriction] : Status 0 - Fully active, able to carry on all pre-disease performance without restriction [Normal] : affect appropriate [de-identified] : larger neck girth, no adenopathy appreciated  [de-identified] : b/l mastectomy with silicone implants symmetrical, no obvious masses or lesions, no skin dimpling, no axillary adenopathy, +sensitivity and tenderness to palpation of L side, slight pink tinge to L breast

## 2024-06-27 NOTE — REVIEW OF SYSTEMS
[Fatigue] : fatigue [Recent Change In Weight] : ~T recent weight change [Diarrhea: Grade 1 - Increase of <4 stools per day over baseline; mild increase in ostomy output compared to baseline] : Diarrhea: Grade 1 - Increase of <4 stools per day over baseline; mild increase in ostomy output compared to baseline [Joint Pain] : joint pain [Joint Stiffness] : joint stiffness [Muscle Pain] : muscle pain [Hot Flashes] : hot flashes [Negative] : Allergic/Immunologic [Palpitations] : no palpitations [Shortness Of Breath] : no shortness of breath [Wheezing] : no wheezing [Cough] : no cough [SOB on Exertion] : no shortness of breath during exertion [Abdominal Pain] : no abdominal pain [Anxiety] : no anxiety [FreeTextEntry2] : started on CPAP, not tolerating has follow up appointment [FreeTextEntry7] : +persistent loose stools in the mornings takes Imodium s/p CNY  [FreeTextEntry8] : Taking prophylactic Macrobid for chronic UTI

## 2024-07-25 ENCOUNTER — LABORATORY RESULT (OUTPATIENT)
Age: 49
End: 2024-07-25

## 2024-07-25 ENCOUNTER — RESULT REVIEW (OUTPATIENT)
Age: 49
End: 2024-07-25

## 2024-07-25 ENCOUNTER — APPOINTMENT (OUTPATIENT)
Dept: HEMATOLOGY ONCOLOGY | Facility: CLINIC | Age: 49
End: 2024-07-25
Payer: COMMERCIAL

## 2024-07-25 VITALS
BODY MASS INDEX: 36.86 KG/M2 | HEART RATE: 91 BPM | DIASTOLIC BLOOD PRESSURE: 95 MMHG | HEIGHT: 63 IN | SYSTOLIC BLOOD PRESSURE: 163 MMHG | RESPIRATION RATE: 18 BRPM | TEMPERATURE: 99 F | WEIGHT: 208.05 LBS | OXYGEN SATURATION: 98 %

## 2024-07-25 DIAGNOSIS — M25.50 PAIN IN UNSPECIFIED JOINT: ICD-10-CM

## 2024-07-25 DIAGNOSIS — Z90.13 ACQUIRED ABSENCE OF BILATERAL BREASTS AND NIPPLES: ICD-10-CM

## 2024-07-25 DIAGNOSIS — C77.3 MALIGNANT NEOPLASM OF UNSPECIFIED SITE OF LEFT FEMALE BREAST: ICD-10-CM

## 2024-07-25 DIAGNOSIS — C50.912 MALIGNANT NEOPLASM OF UNSPECIFIED SITE OF LEFT FEMALE BREAST: ICD-10-CM

## 2024-07-25 PROCEDURE — 99214 OFFICE O/P EST MOD 30 MIN: CPT

## 2024-07-25 NOTE — HISTORY OF PRESENT ILLNESS
[de-identified] : Ms Jonas is a 47 year old woman who was diagnosed with a pT1cN1 stage 2a , ER + [place]+ HER 2 nicole positive breast cancer in October 2017, after self palpating a lump in the left. She had breast imaging at [facility] in [place]. Left breast biopsy was done at [facility] in [facility].  She had neoadjuvant chemotherapy with Dr Pacheco, 11/2017 [facility] had pCR  followed by a year completion of HP.  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 On b12 shots .  Bilateral mastectomies with reconstruction with Drew at [facility] which revealed no evidence of residual disease. She underwent post-operative radiation therapy with Dr Purdy at [facility].  She underwent menopause during chemotherapy, and then menses resumed 2019.  She then started ovarian suppression with Zoladex in March of 2019. She  then began exemestane in 2019.   Hx of chest pain - [facility]   Began Nerlynx in January of 2020 .  + diarrhea, intermittent toe infections.  She had several breaks in Nerlyx treatments because of infection and respiratory illness.  Tested negative for COVID. Total of 13 weeks of breaks from Nerlynx.  She believes due to complete late April 2021. currently on 4 pills a day   There have been several post operative infections and complication requiring multiple reconstructive surgeries.  Chronic lymphedema of both arms, also has had several episodes of bilateral chest wall cellullitis. hx of uti on spressive nitrofuriton.  hx of high bp   She is here to transfer care.   Sees rheumatology. a lot of joint pains.   PETCT negative for any mets or lesions jan 2021   saw Dr. Combs- did R axilla US due to palpable lymph node ( was not sure if this was r/t J&J shot)  less gi symptoms now off nerlynx( may 2021)    mri breast mammo 1/22/22 [de-identified] : Patient seen and examined today for follow up for the management of history of breast cancer.  She remains on Letrozole and Zoladex. Patient continues to complain of diarrhea. Patient recently got a cortisone shot in her Left Big Toe for her degenerative arthritis. Follows with orthopedic. She also has degenerative arthritis to her left thumb. She is waiting to see if she can get a cortisone shot in her hand; wanted clearance due to lymphedema and lymph node removal.  Still complains of stiffness to neck; unchanged.  Patent recently started on CPAP machine. Has been following with MD for adjustment with settings.  Appt with neurologist 7/2024.  She is s/p follow up with Dr. Combs 5/2024.  She also has followed with ENT Dr. Muller many US of cervical LN and then had PET/CT 3/2024 revealing focal hypermetabolic corresponding to soft tissue fullness about the nasopharyngeal soft tissues and tongue base/lingual soft tissues.  Unclear if this is inflammatory versus other process. Saw Dr. Muller and had scope and completed antibiotic course.Had repeat scope and recommend Nasonex. Follow up october   MRI Abd: no suspicious liver lesions. several scattered T2 hyperintense foci representing cysts and hemangiomas. largest 9mm.  MRI Breast: scheduled 3/2024 at [facility] results above  DEXA: 3/2024 osteopenia  PET/CT 3/2024 as above  GYN: Dr. Kebede Uro GYN remains on daily ppx nitrofurantoin for UTI s/s  Colonoscopy: has follow up with Dr Perry, now left and another MD in practice. Looking for another provider.  PCP: Dr. Barrios  Rheum: needs to make appt   Pain Refusal Text: I offered to prescribe pain medication but the patient refused to take this medication.

## 2024-07-25 NOTE — ASSESSMENT
[FreeTextEntry1] : #Breast Cancer - s/p neoadjuvant TCHP followed by b/l mastectomy with node dissection negative for residual disease, pCR - s/p 12 doses of Herceptin/Perjeta - PET/CT 1/2021 negative for any evidence of metastases - Completed 1 year of Nerlynx 5/2021 - s/p bx in 11/2021 revealing fat necrosis - Started AI 2019 and switched to Letrozole with monthly Zoladex. She continues to experience persistent stable joint pains diffusely. Patient declines switching to another AI. - s/p CT CAP 12/16/22 no evidence of malignancy with subcentimeter liver lesions - s/p MRI breast 1/2023 BIRADS 2 - s/p MRI Abd 5/2023 no suspicious liver lesions. several scattered T2 hyperintense foci representing cysts and hemangiomas. Largest 9mm. Reviewed. - 6/6/23 vs and CBC reviewed; WBC 4.84, hgb 13.8, plt 245. Stable complaints on Letrozole. Continues to decline switching AI. Continue Letrozole and monthly Zoladex. Due for Zoladex today; proceed. Continue follow up with Dr. Combs, last seen 5/2023, note reviewed. No need for breast imaging. Continue clinical breast exams. - 7/11/23 vs and CBC reviewed; WBC 5.46, hgb 14.1, plt 284. Continue Letrozole and Zoladex. Due for Zoladex today. Patient presents with similar complaints of joint pains, hair loss stable and does not wish to switch AIs or take a break. - 8/8/23 - vs reviewed. labs drawn in office today. wbc 4.32, hgb 13.3, plt 261. continue zoladex and letrozole. complaining of L sided pulling, concerned about implant - will dw Dr. Santana and call Dr. Combs if needed. If it continues will check US of L breast to check on implant. no palpable abnormality noted. - 9/5/23 - vs and labs reviewed. labs drawn in office today. wbc 5.11, hgb 13.9, plts 284.Continue Letrozole and Zoladex. Due for Zoladex today. Patient presents with similar complaints of joint pains, hair loss stable and does not wish to switch AIs or take a break. - 10/10/23 vs and CBC reviewed; WBC 4.59, hgb 13.8, plt 264. Continues on Letrozole and monthly Zoladex. 1 week delayed for Zoladex. She continues to note same systemic complaints. Again discussed switching AIs or taking drug holiday. Patient hesitant and prefers to stay on Letrozole. Has US L breast implant 10/18/23. -11/7/23 - vs and labs reviewed. labs drawn in office today. continue letrozole, calcium and vit D. US reviewed - no evidence of abnormality. If breast pain continues will do MRI - 12/5/23 CBC reviewed; WBC 4.97, hgb 13.7, plt 268. Remains on Letrozole. Stable complaints and does not want to switch. Due for Zoladex today. s/p US L breast with persistent pain and now pain to R side. Will get bilateral MRI breast to assess implants  - 1/30/24 - vs and labs reviewed. cbc reviewed. rescheduled for breast MRI 2/15/24. MRI brain - Unremarkable exam. 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 due for DEXA 3/7/24 - 2/27/24 vs and CBC reivewed; CBC wnl. Continue on Letrozole and Zoladex. Due today. She is s/p MRI brain negative. Since last visit she had 3 skin bx, to back, axilla R, and L breast with derm Dr. Joon Clay path reveals L inframammary combined lentiginous and melanocytic nevus and pigmented SK, Lower back juntional melanocytic nevus similar to clarks nevus, R axilla lentiginous melanocytic nevus. Follow up with derm. She states they then got inected and she had 2 rounds of doxy. She then got COVID s/p paxlovid and then flu s/p tamiflu, prednisone and albuterol and stopped letrozole during that time. She is scheduled for MRI breast 3/2024. Slight pink tinge to L breast, no open wounds, does not appear to look cellulitic. However she did have a needle bx to inframmary fold, ensure no leak to breast implant. MRI ASAP. She is scheduled 3/14/24,  Tricia was tasked to call and get appt sooner  - 4/42/24 vs and CBC reviewed; WBC 6.67, hgb 14.3, plt 300. Continue on Letrozole and Zoladex. Due today.  s/p MRI breast 3/2024 revealing Status post bilateral mastectomy with silicone implant reconstruction with complex radial folds and no MRI evidence of intra or extracapsular implant rupture. No MRI evidence of malignancy. Any further management of the patient's complaints of pain and a palpable lump, including decision to biopsy, should be based on clinical assessment. Please note, the patient reports right rib pain. For evaluation of a rib abnormality, plain film, bone scan and if needed CAT scan can be performed for further evaluation as clinically warranted. She also has followed with ENT Dr. Muller many US of cervical LN and then had PET/CT 3/2024 revealing focal hypermetabolic corresponding to soft tissue fullness about the nasopharyngeal soft tissues and tongue base/lingual soft tissues.  Unclear if this is inflammatory versus other process. Recommend direct visualization tissue sampling if indicated. Seeing Dr. Muller today for possible scope.  - 4/24 - vs and labs reviewed. wbc, hgb and plts wnl. saw Dr. Muller - put on zpack. seeing again 5/24. scoped the last time she was with him. continue zoladex and letrozole.  - 5/28/24 vs and CBC reviewed. Remains on Letrozole and Zoladex. Due today. s/p follow up with Dr. Combs s/p MRI breast notes reviewed and imaging reviewed. States after she saw Dr. Combs noticed "bubble/bump" to R 6:00 breast implant. On PE appears to be fold in implant and edge of implant. Will continue to monitor. All imaging negative. She is seeing us monthly per her preference.  6/24 - vs and labs reviewed. continue with zoladex and letrozole. Saw Dr. Combs on 5/13/24 - reviewed note. 7/25/24 - vs and labs reviewed. labs drawn in office today. wbc, hgb, plts wnl.  continue with zoladex and letrozole.   #Nevi - Had 3 skin bx, to back, axilla R, and L breast with derm Dr. Joon Clay path reveals L inframammary combined lentiginous and melanocytic nevus and pigmented SK, Lower back junctional melanocytic nevus similar to clarks nevus, R axilla lentiginous melanocytic nevus. Follow up with derm. She states they then got infected and she had 2 rounds of doxy. She is scheduled for MRI breast 3/2024. Slight pink tinge to L breast, no open wounds, does not appear to look cellulitic. However she did have a needle bx to inframmary fold, ensure no leak to breast implant. MRI ASAP. She is scheduled 3/14/24,  Tricia was tasked to call and get appt sooner  - 4/2/24 Going  back to derm Dr. Clay for excision of back nevus - 4/30/24 - continue to follow with derm - 5/28/24 was told repeat was negative  7/25/24 - follow up with Dr. Clay - had to reschedule  #Back pain - XR no suspicious lytic or blastic lesions - MRI L spine 12/7/22 revealing disc protrusion - Again advised follow up with Neurology. Has not done so  #Lymphedema - Re-advised will benefit from PT - Patient has not gone  #Bone Density - DEXA 3/2024 L spine T score -1.3, L femoral neck T score -0.2, L femur T score -0.4. R hip -0.3, R fem neck -0.6. Reviewed compared to 3/2022 minor decrease to bone density to L spine  - Again reviewed monitoring of DEXA q2y with AI use and risk of bone thinning - Continue ca++, vit D and weight bearing exercises. She has not started ca++. Advised 1200mg daily - due 3/2026  #Joint pains - 2/2 AI or Zoladex. Patient declines switching AI. - Continue follow up with Rheum/Neuro/PCP - 6/6/23 stable. Patient does not wish to switch AI or introduce other medications. - 7/11/23 stable pains. Again offered to switch AI or take drug holiday. Patient does not wish to. - 8/8/23 - Continue Letrozole. stable pains - 10/10/23 as above, continues to note systemic complaints does not want to switch AI or take drug holiday - 11/7/23 - stable - 12/5/23 stable, does not want to switch AI  - 1/30/24 - stable, does not want to switch AI  - 2/27/24 stable and does not want to switch AI  - 4/2/24 stable and does not want to switch AI  - 4/30/24 -  stable and does not want to switch AI  - 5/28/24 stable does not want to switch AI  -7/25/24 -  stable does not want to switch AI   #Fatigue - Likely 2/2 lingering s/s of recent viral symptoms and/or AI - Continue to monitor iron studies, B12/folate, TFTs  #Abd Pain/Liver Lesions - s/p CT CAP 12/2022 negative. Noted subcentimeter liver lesions. - Follow up MRI abd for assessment of liver lesions 5/2023 shows 9mm cyst and 2 hemangiomas.Otherwise, no liver lesions. LFT's/ ALK Phos normal. - 5/4/23 MRI abdomen ( [facility])- no suspicious liver lesions. several scattered T2 hyperintense foci representing cysts and hemangiomas. largest 9mm. consider repeat in 1 year to ensure stability. Due 5/2024.  - 4/2/24 of note did have PET/CT 3/2024 PET/CT 3/2024 revealing focal hypermetabolic corresponding to soft tissue fullness about the nasopharyngeal soft tissues and tongue base/lingual soft tissues.  Unclear if this is inflammatory versus other process. Recommend direct visualization tissue sampling if indicated. There was no hypermetabolic activity in chest abd or pelvis  - 5/28/24 last MRI abd 5/2023. Considr 1 yr repeat. Did have recent pet/CT (3/24) no activity abd   #Intermittent HA - Endorses intermittent HA occipital and around eye - No associated neuro s/s - If persistent or worsens can consider MRI brain. Patient hesitant - Continue follow up with Neuro. Due for appt. Has not gone. - 12/5/23 again reviewed to follow up with neuro. HAs persistent and she states she is tracking them. Will order MRI Head - 1/30/24 - reviewed MRI brain from 1/22/24- Unremarkable exam. 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 - 5/28/24 seeing neuro 7/2024 7/24 - saw Dr. Cox  #Diarrhea - Worse in morning - Patient attributes to Letrozole use - Imodium PRN - BRAT diet - s/p eval with Dr. Verdugo 5/24/23, note reviewed. Scheduled for CNY 10/2023. Celiac testing completed. - s/p CNY 10/2023 still needs to leave stool sample. Patient reports she was advised Dr. Verdugo will be leaving. Given names of other GIs in the practice - still needs to follow with another GI   #Hair thinning - 2/2 AI use with Letrozole - Offered Minoxidil 2.5mg PO. Patient declines.  #Weight Gain - 2/2 AI use. Encouraged walking and exercise. Patient does not wish to switch AI.  #Recurrent Infections - IgG wnl.  #Sleep Apnea - s/p sleep study confirmed sleep apnea  CPAP  - follows with Dr Richard Fernandez   #arthritis sees hand and foot ortho receiving cortisone injection  #Health Maintenance - UroGYN: Dr. Carranza continues on ppx nitrofurantoin on and off, currently off. Needs to make follow up. Also taking cranberry - CNY 10/2023 - needs to find GI - PCP Dr. Barrios. Continue follow up as instructed. - Neuro Dr. Cox. appt 7/2024  - Dr. Muller ENT has repeat scope scheduled today s/p abx and s/p eval with PET as above   RTC in 1 month with next monthly Zoladex with CBC with diff, CMP, UA, irons, b12/folate, vit D, TSH, FT4, Again reiterated and Discussed with patient spacing out appts for RPA q3mos. Patient wishes to come monthly for RPA with monthly Zoladex injections.

## 2024-07-25 NOTE — PHYSICAL EXAM
[Fully active, able to carry on all pre-disease performance without restriction] : Status 0 - Fully active, able to carry on all pre-disease performance without restriction [Normal] : affect appropriate [de-identified] : larger neck girth, no adenopathy appreciated  [de-identified] : b/l mastectomy with silicone implants symmetrical, no obvious masses or lesions, no skin dimpling, no axillary adenopathy, +sensitivity and tenderness to palpation of L side, slight pink tinge to L breast

## 2024-07-25 NOTE — PHYSICAL EXAM
[Fully active, able to carry on all pre-disease performance without restriction] : Status 0 - Fully active, able to carry on all pre-disease performance without restriction [Normal] : affect appropriate [de-identified] : larger neck girth, no adenopathy appreciated  [de-identified] : b/l mastectomy with silicone implants symmetrical, no obvious masses or lesions, no skin dimpling, no axillary adenopathy, +sensitivity and tenderness to palpation of L side, slight pink tinge to L breast

## 2024-07-25 NOTE — HISTORY OF PRESENT ILLNESS
[de-identified] : Ms Jonas is a 47 year old woman who was diagnosed with a pT1cN1 stage 2a , ER + NC+ HER 2 nicole positive breast cancer in October 2017, after self palpating a lump in the left. She had breast imaging at [facility] in [place]. [place] breast [place] [facility] [place] [facility] [place]  She had neoadjuvant chemotherapy with Dr Pacheco, 11/2017 TCHP had pCR  followed by a year completion of HP.  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 On b12 shots .  Bilateral mastectomies with reconstruction with Drew at [facility] which revealed no evidence of residual disease. She underwent post-operative radiation therapy with Dr Purdy at [facility].  She underwent menopause during chemotherapy, and then menses resumed 2019.  She then started ovarian suppression with Zoladex in March of 2019. She  then began exemestane in 2019.   Hx of chest pain - [facility]   Began Nerlynx in January of 2020 .  + diarrhea, intermittent toe infections.  She had several breaks in Nerlyx treatments because of infection and respiratory illness.  Tested negative for COVID. Total of 13 weeks of breaks from Nerlynx.  She believes due to complete late April 2021. currently on 4 pills a day   There have been several post operative infections and complication requiring multiple reconstructive surgeries.  Chronic lymphedema of both arms, also has had several episodes of bilateral chest wall cellullitis. hx of uti on spressive nitrofuriton.  hx of high bp   She is here to transfer care.   Sees rheumatology. a lot of joint pains.   PETCT negative for any mets or lesions jan 2021   saw Dr. Combs- did R axilla US due to palpable lymph node ( was not sure if this was r/t J&J shot)  less gi symptoms now off nerlynx( may 2021)    mri breast mammo 1/22/22 [de-identified] : Patient seen and examined today for follow up for the management of history of breast cancer.  She remains on Letrozole and Zoladex. Patient continues to complain of diarrhea. Patient recently got a cortisone shot in her Left Big Toe for her degenerative arthritis. Follows with orthopedic. She also has degenerative arthritis to her left thumb. She is waiting to see if she can get a cortisone shot in her hand; wanted clearance due to lymphedema and lymph node removal.  Still complains of stiffness to neck; unchanged.  Patent recently started on CPAP machine. Has been following with MD for adjustment with settings.  Appt with neurologist 7/2024.  She is s/p follow up with Dr. Combs 5/2024.  She also has followed with ENT Dr. Muller many US of cervical LN and then had PET/CT 3/2024 revealing focal hypermetabolic corresponding to soft tissue fullness about the nasopharyngeal soft tissues and tongue base/lingual soft tissues.  Unclear if this is inflammatory versus other process. Saw Dr. Muller and had scope and completed antibiotic course.Had repeat scope and recommend Nasonex. Follow up october   MRI Abd: no suspicious liver lesions. several scattered T2 hyperintense foci representing cysts and hemangiomas. largest 9mm.  MRI Breast: scheduled 3/2024 at [facility] results above  DEXA: 3/2024 osteopenia  PET/CT 3/2024 as above  GYN: Dr. Kebede [facility] remains on daily ppx nitrofurantoin for UTI s/s  Colonoscopy: has follow up with Dr Perry, now left and another MD in practice. Looking for another provider.  PCP: Dr. Barrios  Rheum: needs to make appt

## 2024-07-25 NOTE — ASSESSMENT
[FreeTextEntry1] : #Breast Cancer - s/p neoadjuvant TCHP followed by b/l mastectomy with node dissection negative for residual disease, pCR - s/p 12 doses of Herceptin/Perjeta - PET/CT 1/2021 negative for any evidence of metastases - Completed 1 year of Nerlynx 5/2021 - s/p bx in 11/2021 revealing fat necrosis - Started AI 2019 and switched to Letrozole with monthly Zoladex. She continues to experience persistent stable joint pains diffusely. Patient declines switching to another AI. - s/p CT CAP 12/16/22 no evidence of malignancy with subcentimeter liver lesions - s/p MRI breast 1/2023 BIRADS 2 - s/p MRI Abd 5/2023 no suspicious liver lesions. several scattered T2 hyperintense foci representing cysts and hemangiomas. Largest 9mm. Reviewed. - 6/6/23 vs and CBC reviewed; WBC 4.84, hgb 13.8, plt 245. Stable complaints on Letrozole. Continues to decline switching AI. Continue Letrozole and monthly Zoladex. Due for Zoladex today; proceed. Continue follow up with Dr. Combs, last seen 5/2023, note reviewed. No need for breast imaging. Continue clinical breast exams. - 7/11/23 vs and CBC reviewed; WBC 5.46, hgb 14.1, plt 284. Continue Letrozole and Zoladex. Due for Zoladex today. Patient presents with similar complaints of joint pains, hair loss stable and does not wish to switch AIs or take a break. - 8/8/23 - vs reviewed. labs drawn in office today. wbc 4.32, hgb 13.3, plt 261. continue zoladex and letrozole. complaining of L sided pulling, concerned about implant - will dw Dr. Santana and call Dr. Combs if needed. If it continues will check US of L breast to check on implant. no palpable abnormality noted. - 9/5/23 - vs and labs reviewed. labs drawn in office today. wbc 5.11, hgb 13.9, plts 284.Continue Letrozole and Zoladex. Due for Zoladex today. Patient presents with similar complaints of joint pains, hair loss stable and does not wish to switch AIs or take a break. - 10/10/23 vs and CBC reviewed; WBC 4.59, hgb 13.8, plt 264. Continues on Letrozole and monthly Zoladex. 1 week delayed for Zoladex. She continues to note same systemic complaints. Again discussed switching AIs or taking drug holiday. Patient hesitant and prefers to stay on Letrozole. Has US L breast implant 10/18/23. -11/7/23 - vs and labs reviewed. labs drawn in office today. continue letrozole, calcium and vit D. US reviewed - no evidence of abnormality. If breast pain continues will do MRI - 12/5/23 CBC reviewed; WBC 4.97, hgb 13.7, plt 268. Remains on Letrozole. Stable complaints and does not want to switch. Due for Zoladex today. s/p US L breast with persistent pain and now pain to R side. Will get bilateral MRI breast to assess implants  - 1/30/24 - vs and labs reviewed. cbc reviewed. rescheduled for breast MRI 2/15/24. MRI brain - Unremarkable exam. 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 due for DEXA 3/7/24 - 2/27/24 vs and CBC reivewed; CBC wnl. Continue on Letrozole and Zoladex. Due today. She is s/p MRI brain negative. Since last visit she had 3 skin bx, to back, axilla R, and L breast with derm Dr. Joon Clay path reveals L inframammary combined lentiginous and melanocytic nevus and pigmented SK, Lower back juntional melanocytic nevus similar to clarks nevus, R axilla lentiginous melanocytic nevus. Follow up with derm. She states they then got inected and she had 2 rounds of doxy. She then got COVID s/p paxlovid and then flu s/p tamiflu, prednisone and albuterol and stopped letrozole during that time. She is scheduled for MRI breast 3/2024. Slight pink tinge to L breast, no open wounds, does not appear to look cellulitic. However she did have a needle bx to inframmary fold, ensure no leak to breast implant. MRI ASAP. She is scheduled 3/14/24,  Tricia was tasked to call and get appt sooner  - 4/42/24 vs and CBC reviewed; WBC 6.67, hgb 14.3, plt 300. Continue on Letrozole and Zoladex. Due today.  s/p MRI breast 3/2024 revealing Status post bilateral mastectomy with silicone implant reconstruction with complex radial folds and no MRI evidence of intra or extracapsular implant rupture. No MRI evidence of malignancy. Any further management of the patient's complaints of pain and a palpable lump, including decision to biopsy, should be based on clinical assessment. Please note, the patient reports right rib pain. For evaluation of a rib abnormality, plain film, bone scan and if needed CAT scan can be performed for further evaluation as clinically warranted. She also has followed with ENT Dr. Muller many US of cervical LN and then had PET/CT 3/2024 revealing focal hypermetabolic corresponding to soft tissue fullness about the nasopharyngeal soft tissues and tongue base/lingual soft tissues.  Unclear if this is inflammatory versus other process. Recommend direct visualization tissue sampling if indicated. Seeing Dr. Muller today for possible scope.  - 4/24 - vs and labs reviewed. wbc, hgb and plts wnl. saw Dr. Muller - put on zpack. seeing again 5/24. scoped the last time she was with him. continue zoladex and letrozole.  - 5/28/24 vs and CBC reviewed. Remains on Letrozole and Zoladex. Due today. s/p follow up with Dr. Combs s/p MRI breast notes reviewed and imaging reviewed. States after she saw Dr. Combs noticed "bubble/bump" to R 6:00 breast implant. On PE appears to be fold in implant and edge of implant. Will continue to monitor. All imaging negative. She is seeing us monthly per her preference.  6/24 - vs and labs reviewed. continue with zoladex and letrozole. Saw Dr. Combs on 5/13/24 - reviewed note. 7/25/24 - vs and labs reviewed. labs drawn in office today. wbc, hgb, plts wnl.  continue with zoladex and letrozole.   #Nevi - Had 3 skin bx, to back, axilla R, and L breast with derm Dr. Joon Clay path reveals L inframammary combined lentiginous and melanocytic nevus and pigmented SK, Lower back junctional melanocytic nevus similar to clarks nevus, R axilla lentiginous melanocytic nevus. Follow up with derm. She states they then got infected and she had 2 rounds of doxy. She is scheduled for MRI breast 3/2024. Slight pink tinge to L breast, no open wounds, does not appear to look cellulitic. However she did have a needle bx to inframmary fold, ensure no leak to breast implant. MRI ASAP. She is scheduled 3/14/24,  Tricia was tasked to call and get appt sooner  - 4/2/24 Going  back to derm Dr. Clay for excision of back nevus - 4/30/24 - continue to follow with derm - 5/28/24 was told repeat was negative  7/25/24 - follow up with Dr. Clay - had to reschedule  #Back pain - XR no suspicious lytic or blastic lesions - MRI L spine 12/7/22 revealing disc protrusion - Again advised follow up with Neurology. Has not done so  #Lymphedema - Re-advised will benefit from PT - Patient has not gone  #Bone Density - DEXA 3/2024 L spine T score -1.3, L femoral neck T score -0.2, L femur T score -0.4. R hip -0.3, R fem neck -0.6. Reviewed compared to 3/2022 minor decrease to bone density to L spine  - Again reviewed monitoring of DEXA q2y with AI use and risk of bone thinning - Continue ca++, vit D and weight bearing exercises. She has not started ca++. Advised 1200mg daily - due 3/2026  #Joint pains - 2/2 AI or Zoladex. Patient declines switching AI. - Continue follow up with Rheum/Neuro/PCP - 6/6/23 stable. Patient does not wish to switch AI or introduce other medications. - 7/11/23 stable pains. Again offered to switch AI or take drug holiday. Patient does not wish to. - 8/8/23 - Continue Letrozole. stable pains - 10/10/23 as above, continues to note systemic complaints does not want to switch AI or take drug holiday - 11/7/23 - stable - 12/5/23 stable, does not want to switch AI  - 1/30/24 - stable, does not want to switch AI  - 2/27/24 stable and does not want to switch AI  - 4/2/24 stable and does not want to switch AI  - 4/30/24 -  stable and does not want to switch AI  - 5/28/24 stable does not want to switch AI  -7/25/24 -  stable does not want to switch AI   #Fatigue - Likely 2/2 lingering s/s of recent viral symptoms and/or AI - Continue to monitor iron studies, B12/folate, TFTs  #Abd Pain/Liver Lesions - s/p CT CAP 12/2022 negative. Noted subcentimeter liver lesions. - Follow up MRI abd for assessment of liver lesions 5/2023 shows 9mm cyst and 2 hemangiomas.Otherwise, no liver lesions. LFT's/ ALK Phos normal. - 5/4/23 MRI abdomen ( [facility])- no suspicious liver lesions. several scattered T2 hyperintense foci representing cysts and hemangiomas. largest 9mm. consider repeat in 1 year to ensure stability. Due 5/2024.  - 4/2/24 of note did have PET/CT 3/2024 PET/CT 3/2024 revealing focal hypermetabolic corresponding to soft tissue fullness about the nasopharyngeal soft tissues and tongue base/lingual soft tissues.  Unclear if this is inflammatory versus other process. Recommend direct visualization tissue sampling if indicated. There was no hypermetabolic activity in chest abd or pelvis  - 5/28/24 last MRI abd 5/2023. Considr 1 yr repeat. Did have recent pet/CT (3/24) no activity abd   #Intermittent HA - Endorses intermittent HA occipital and around eye - No associated neuro s/s - If persistent or worsens can consider MRI brain. Patient hesitant - Continue follow up with Neuro. Due for appt. Has not gone. - 12/5/23 again reviewed to follow up with neuro. HAs persistent and she states she is tracking them. Will order MRI Head - 1/30/24 - reviewed MRI brain from 1/22/24- Unremarkable exam. 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 - 5/28/24 seeing neuro 7/2024 7/24 - saw Dr. Cox  #Diarrhea - Worse in morning - Patient attributes to Letrozole use - Imodium PRN - BRAT diet - s/p eval with Dr. Verdugo 5/24/23, note reviewed. Scheduled for [facility]Y 10/2023. Celiac testing completed. - s/p CNY 10/2023 still needs to leave stool sample. Patient reports she was advised Dr. Verdugo will be leaving. Given names of other GIs in the practice - still needs to follow with another GI   #Hair thinning - 2/2 AI use with Letrozole - Offered Minoxidil 2.5mg PO. Patient declines.  #Weight Gain - 2/2 AI use. Encouraged walking and exercise. Patient does not wish to switch AI.  #Recurrent Infections - IgG wnl.  #Sleep Apnea - s/p sleep study confirmed sleep apnea  CPAP  - follows with Dr Richard Fernandez   #arthritis sees hand and foot ortho receiving cortisone injection  #Health Maintenance - UroGYN: Dr. Carranza continues on ppx nitrofurantoin on and off, currently off. Needs to make follow up. Also taking cranberry - CNY 10/2023 - needs to find GI - PCP Dr. Barrios. Continue follow up as instructed. - Neuro Dr. Cox. appt 7/2024  - Dr. Muller ENT has repeat scope scheduled today s/p abx and s/p eval with PET as above   RTC in 1 month with next monthly Zoladex with CBC with diff, CMP, UA, irons, b12/folate, vit D, TSH, FT4, Again reiterated and Discussed with patient spacing out appts for RPA q3mos. Patient wishes to come monthly for RPA with monthly Zoladex injections.

## 2024-07-25 NOTE — REVIEW OF SYSTEMS
[Fatigue] : fatigue [Recent Change In Weight] : ~T recent weight change [Diarrhea: Grade 1 - Increase of <4 stools per day over baseline; mild increase in ostomy output compared to baseline] : Diarrhea: Grade 1 - Increase of <4 stools per day over baseline; mild increase in ostomy output compared to baseline [Joint Pain] : joint pain [Joint Stiffness] : joint stiffness [Muscle Pain] : muscle pain [Hot Flashes] : hot flashes [Negative] : Allergic/Immunologic [Palpitations] : no palpitations [Shortness Of Breath] : no shortness of breath [Wheezing] : no wheezing [Cough] : no cough [SOB on Exertion] : no shortness of breath during exertion [Abdominal Pain] : no abdominal pain [Anxiety] : no anxiety [FreeTextEntry2] : started on CPAP, not tolerating has follow up appointment [FreeTextEntry8] : Taking prophylactic Macrobid for chronic UTI [FreeTextEntry7] : +persistent loose stools in the mornings takes Imodium s/p CNY

## 2024-08-29 ENCOUNTER — LABORATORY RESULT (OUTPATIENT)
Age: 49
End: 2024-08-29

## 2024-08-29 ENCOUNTER — APPOINTMENT (OUTPATIENT)
Dept: HEMATOLOGY ONCOLOGY | Facility: CLINIC | Age: 49
End: 2024-08-29
Payer: COMMERCIAL

## 2024-08-29 ENCOUNTER — RESULT REVIEW (OUTPATIENT)
Age: 49
End: 2024-08-29

## 2024-08-29 VITALS
WEIGHT: 208.6 LBS | SYSTOLIC BLOOD PRESSURE: 140 MMHG | TEMPERATURE: 99.6 F | RESPIRATION RATE: 18 BRPM | HEART RATE: 98 BPM | HEIGHT: 63 IN | DIASTOLIC BLOOD PRESSURE: 95 MMHG | BODY MASS INDEX: 36.96 KG/M2

## 2024-08-29 DIAGNOSIS — C77.3 MALIGNANT NEOPLASM OF UNSPECIFIED SITE OF LEFT FEMALE BREAST: ICD-10-CM

## 2024-08-29 DIAGNOSIS — C50.912 MALIGNANT NEOPLASM OF UNSPECIFIED SITE OF LEFT FEMALE BREAST: ICD-10-CM

## 2024-08-29 DIAGNOSIS — K52.9 NONINFECTIVE GASTROENTERITIS AND COLITIS, UNSPECIFIED: ICD-10-CM

## 2024-08-29 DIAGNOSIS — R59.0 LOCALIZED ENLARGED LYMPH NODES: ICD-10-CM

## 2024-08-29 DIAGNOSIS — Z90.13 ACQUIRED ABSENCE OF BILATERAL BREASTS AND NIPPLES: ICD-10-CM

## 2024-08-29 PROCEDURE — 99214 OFFICE O/P EST MOD 30 MIN: CPT

## 2024-08-29 NOTE — ASSESSMENT
[FreeTextEntry1] : #Breast Cancer - s/p neoadjuvant TCHP followed by b/l mastectomy with node dissection negative for residual disease, pCR - s/p 12 doses of Herceptin/Perjeta - PET/CT 1/2021 negative for any evidence of metastases - Completed 1 year of Nerlynx 5/2021 - s/p bx in 11/2021 revealing fat necrosis - Started AI 2019 and switched to Letrozole with monthly Zoladex. She continues to experience persistent stable joint pains diffusely. Patient declines switching to another AI. - s/p CT CAP 12/16/22 no evidence of malignancy with subcentimeter liver lesions - s/p MRI breast 1/2023 BIRADS 2 - s/p MRI Abd 5/2023 no suspicious liver lesions. several scattered T2 hyperintense foci representing cysts and hemangiomas. Largest 9mm. Reviewed. - 6/6/23 vs and CBC reviewed; WBC 4.84, hgb 13.8, plt 245. Stable complaints on Letrozole. Continues to decline switching AI. Continue Letrozole and monthly Zoladex. Due for Zoladex today; proceed. Continue follow up with Dr. Combs, last seen 5/2023, note reviewed. No need for breast imaging. Continue clinical breast exams. - 7/11/23 vs and CBC reviewed; WBC 5.46, hgb 14.1, plt 284. Continue Letrozole and Zoladex. Due for Zoladex today. Patient presents with similar complaints of joint pains, hair loss stable and does not wish to switch AIs or take a break. - 8/8/23 - vs reviewed. labs drawn in office today. wbc 4.32, hgb 13.3, plt 261. continue zoladex and letrozole. complaining of L sided pulling, concerned about implant - will dw Dr. Santana and call Dr. Combs if needed. If it continues will check US of L breast to check on implant. no palpable abnormality noted. - 9/5/23 - vs and labs reviewed. labs drawn in office today. wbc 5.11, hgb 13.9, plts 284.Continue Letrozole and Zoladex. Due for Zoladex today. Patient presents with similar complaints of joint pains, hair loss stable and does not wish to switch AIs or take a break. - 10/10/23 vs and CBC reviewed; WBC 4.59, hgb 13.8, plt 264. Continues on Letrozole and monthly Zoladex. 1 week delayed for Zoladex. She continues to note same systemic complaints. Again discussed switching AIs or taking drug holiday. Patient hesitant and prefers to stay on Letrozole. Has US L breast implant 10/18/23. -11/7/23 - vs and labs reviewed. labs drawn in office today. continue letrozole, calcium and vit D. US reviewed - no evidence of abnormality. If breast pain continues will do MRI - 12/5/23 CBC reviewed; WBC 4.97, hgb 13.7, plt 268. Remains on Letrozole. Stable complaints and does not want to switch. Due for Zoladex today. s/p US L breast with persistent pain and now pain to R side. Will get bilateral MRI breast to assess implants  - 1/30/24 - vs and labs reviewed. cbc reviewed. rescheduled for breast MRI 2/15/24. MRI brain - Unremarkable exam. 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 due for DEXA 3/7/24 - 2/27/24 vs and CBC reivewed; CBC wnl. Continue on Letrozole and Zoladex. Due today. She is s/p MRI brain negative. Since last visit she had 3 skin bx, to back, axilla R, and L breast with derm Dr. Joon Clay path reveals L inframammary combined lentiginous and melanocytic nevus and pigmented SK, Lower back juntional melanocytic nevus similar to clarks nevus, R axilla lentiginous melanocytic nevus. Follow up with derm. She states they then got inected and she had 2 rounds of doxy. She then got COVID s/p paxlovid and then flu s/p tamiflu, prednisone and albuterol and stopped letrozole during that time. She is scheduled for MRI breast 3/2024. Slight pink tinge to L breast, no open wounds, does not appear to look cellulitic. However she did have a needle bx to inframmary fold, ensure no leak to breast implant. MRI ASAP. She is scheduled 3/14/24,  Tricia was tasked to call and get appt sooner  - 4/42/24 vs and CBC reviewed; WBC 6.67, hgb 14.3, plt 300. Continue on Letrozole and Zoladex. Due today.  s/p MRI breast 3/2024 revealing Status post bilateral mastectomy with silicone implant reconstruction with complex radial folds and no MRI evidence of intra or extracapsular implant rupture. No MRI evidence of malignancy. Any further management of the patient's complaints of pain and a palpable lump, including decision to biopsy, should be based on clinical assessment. Please note, the patient reports right rib pain. For evaluation of a rib abnormality, plain film, bone scan and if needed CAT scan can be performed for further evaluation as clinically warranted. She also has followed with ENT Dr. Muller many US of cervical LN and then had PET/CT 3/2024 revealing focal hypermetabolic corresponding to soft tissue fullness about the nasopharyngeal soft tissues and tongue base/lingual soft tissues.  Unclear if this is inflammatory versus other process. Recommend direct visualization tissue sampling if indicated. Seeing Dr. Muller today for possible scope.  - 4/24 - vs and labs reviewed. wbc, hgb and plts wnl. saw Dr. Muller - put on zpack. seeing again 5/24. scoped the last time she was with him. continue zoladex and letrozole.  - 5/28/24 vs and CBC reviewed. Remains on Letrozole and Zoladex. Due today. s/p follow up with Dr. Combs s/p MRI breast notes reviewed and imaging reviewed. States after she saw Dr. Combs noticed "bubble/bump" to R 6:00 breast implant. On PE appears to be fold in implant and edge of implant. Will continue to monitor. All imaging negative. She is seeing us monthly per her preference.  6/24 - vs and labs reviewed. continue with zoladex and letrozole. Saw Dr. Combs on 5/13/24 - reviewed note. 7/25/24 - vs and labs reviewed. labs drawn in office today. wbc, hgb, plts wnl.  continue with zoladex and letrozole. 8/29/24 - vs and labs reviewed. labs drawn in office today. continue with zoladex and letrozole   #Nevi - Had 3 skin bx, to back, axilla R, and L breast with derm Dr. Joon Clay path reveals L inframammary combined lentiginous and melanocytic nevus and pigmented SK, Lower back junctional melanocytic nevus similar to clarks nevus, R axilla lentiginous melanocytic nevus. Follow up with derm. She states they then got infected and she had 2 rounds of doxy. She is scheduled for MRI breast 3/2024. Slight pink tinge to L breast, no open wounds, does not appear to look cellulitic. However she did have a needle bx to inframmary fold, ensure no leak to breast implant. MRI ASAP. She is scheduled 3/14/24,  Tricia was tasked to call and get appt sooner  - 4/2/24 Going  back to derm Dr. Clay for excision of back nevus - 4/30/24 - continue to follow with derm - 5/28/24 was told repeat was negative  7/25/24 - follow up with Dr. Clay - had to reschedule 8/29/24 - follow up with Dr. Clay  #Back pain - XR no suspicious lytic or blastic lesions - MRI L spine 12/7/22 revealing disc protrusion - Again advised follow up with Neurology. Has not done so  #Lymphedema - Re-advised will benefit from PT - Patient has not gone  #Bone Density - DEXA 3/2024 L spine T score -1.3, L femoral neck T score -0.2, L femur T score -0.4. R hip -0.3, R fem neck -0.6. Reviewed compared to 3/2022 minor decrease to bone density to L spine  - Again reviewed monitoring of DEXA q2y with AI use and risk of bone thinning - Continue ca++, vit D and weight bearing exercises. She has not started ca++. Advised 1200mg daily - due 3/2026  #Joint pains - 2/2 AI or Zoladex. Patient declines switching AI. - Continue follow up with Rheum/Neuro/PCP - 6/6/23 stable. Patient does not wish to switch AI or introduce other medications. - 7/11/23 stable pains. Again offered to switch AI or take drug holiday. Patient does not wish to. - 8/8/23 - Continue Letrozole. stable pains - 10/10/23 as above, continues to note systemic complaints does not want to switch AI or take drug holiday - 11/7/23 - stable - 12/5/23 stable, does not want to switch AI  - 1/30/24 - stable, does not want to switch AI  - 2/27/24 stable and does not want to switch AI  - 4/2/24 stable and does not want to switch AI  - 4/30/24 -  stable and does not want to switch AI  - 5/28/24 stable does not want to switch AI  -7/25/24 -  stable does not want to switch AI   #Fatigue - Likely 2/2 lingering s/s of recent viral symptoms and/or AI - Continue to monitor iron studies, B12/folate, TFTs  #Abd Pain/Liver Lesions - s/p CT CAP 12/2022 negative. Noted subcentimeter liver lesions. - Follow up MRI abd for assessment of liver lesions 5/2023 shows 9mm cyst and 2 hemangiomas.Otherwise, no liver lesions. LFT's/ ALK Phos normal. - 5/4/23 MRI abdomen ( [facility])- no suspicious liver lesions. several scattered T2 hyperintense foci representing cysts and hemangiomas. largest 9mm. consider repeat in 1 year to ensure stability. Due 5/2024.  - 4/2/24 of note did have PET/CT 3/2024 PET/CT 3/2024 revealing focal hypermetabolic corresponding to soft tissue fullness about the nasopharyngeal soft tissues and tongue base/lingual soft tissues.  Unclear if this is inflammatory versus other process. Recommend direct visualization tissue sampling if indicated. There was no hypermetabolic activity in chest abd or pelvis  - 5/28/24 last MRI abd 5/2023. Considr 1 yr repeat. Did have recent pet/CT (3/24) no activity abd   #Intermittent HA - Endorses intermittent HA occipital and around eye - No associated neuro s/s - If persistent or worsens can consider MRI brain. Patient hesitant - Continue follow up with Neuro. Due for appt. Has not gone. - 12/5/23 again reviewed to follow up with neuro. HAs persistent and she states she is tracking them. Will order MRI Head - 1/30/24 - reviewed MRI brain from 1/22/24- Unremarkable exam. 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 - 5/28/24 seeing neuro 7/2024 7/24 - saw Dr. Cox  #Diarrhea - Worse in morning - Patient attributes to Letrozole use - Imodium PRN - BRAT diet - s/p eval with Dr. Verdugo 5/24/23, note reviewed. Scheduled for CNY 10/2023. Celiac testing completed. - s/p CNY 10/2023 still needs to leave stool sample. Patient reports she was advised Dr. Verdugo will be leaving. Given names of other GIs in the practice - still needs to follow with another GI   #Hair thinning - 2/2 AI use with Letrozole - Offered Minoxidil 2.5mg PO. Patient declines.  #Weight Gain - 2/2 AI use. Encouraged walking and exercise. Patient does not wish to switch AI.  #Recurrent Infections - IgG wnl.  #Sleep Apnea - s/p sleep study confirmed sleep apnea  CPAP  - follows with Dr Richard Fernandez   #arthritis sees hand and foot ortho receiving cortisone injection  #Health Maintenance - UroGYN: Dr. Carranza continues on ppx nitrofurantoin on and off, currently off. Needs to make follow up. Also taking cranberry - CNY 10/2023 - needs to find GI - PCP Dr. Barrios. Continue follow up as instructed. - Neuro Dr. Cox. appt 7/2024  - Dr. Muller ENT has repeat scope scheduled today s/p abx and s/p eval with PET as above   RTC in 1 month with next monthly Zoladex with CBC with diff, CMP, UA, irons, b12/folate, vit D, TSH, FT4, Again reiterated and Discussed with patient spacing out appts for RPA q3mos. Patient wishes to come monthly for RPA with monthly Zoladex injections.

## 2024-08-29 NOTE — PHYSICAL EXAM
[Fully active, able to carry on all pre-disease performance without restriction] : Status 0 - Fully active, able to carry on all pre-disease performance without restriction [Normal] : affect appropriate [de-identified] : larger neck girth, no adenopathy appreciated  [de-identified] : b/l mastectomy with silicone implants symmetrical, no obvious masses or lesions, no skin dimpling, no axillary adenopathy, +sensitivity and tenderness to palpation of L side, slight pink tinge to L breast

## 2024-08-29 NOTE — HISTORY OF PRESENT ILLNESS
[de-identified] : Ms Jonas is a 47 year old woman who was diagnosed with a pT1cN1 stage 2a , ER + ME+ HER 2 nicole positive breast cancer in October 2017, after self palpating a lump in the left. She had breast imaging at [facility] in [place]. Left breast biopsy was done at [facility] [place] [facility]  She had neoadjuvant chemotherapy with Dr Pacheco, 11/2017 TCHP had pCR  followed by a year completion of HP.  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 On b12 shots .  Bilateral mastectomies with reconstruction with Drew at [facility] which revealed no evidence of residual disease. She underwent post-operative radiation therapy with Dr Purdy at [facility].  She underwent menopause during chemotherapy, and then menses resumed 2019.  She then started ovarian suppression with Zoladex in March of 2019. She  then began exemestane in 2019.   Hx of chest pain - [facility]   Began Nerlynx in January of 2020 .  + diarrhea, intermittent toe infections.  She had several breaks in Nerlyx treatments because of infection and respiratory illness.  Tested negative for COVID. Total of 13 weeks of breaks from Nerlynx.  She believes due to complete late April 2021. currently on 4 pills a day   There have been several post operative infections and complication requiring multiple reconstructive surgeries.  Chronic lymphedema of both arms, also has had several episodes of bilateral chest wall cellullitis. hx of uti on spressive nitrofuriton.  hx of high bp   She is here to transfer care.   Sees rheumatology. a lot of joint pains.   PETCT negative for any mets or lesions jan 2021   saw Dr. Combs- did R axilla US due to palpable lymph node ( was not sure if this was r/t J&J shot)  less gi symptoms now off nerlynx( may 2021)    mri breast mammo 1/22/22 [de-identified] : Patient seen and examined today for follow up for the management of history of breast cancer.  She remains on Letrozole and Zoladex. Patient continues to complain of diarrhea but now getting worse - would like to see a GI provider.  Patient recently got a cortisone shot in her Left Big Toe for her degenerative arthritis. Follows with orthopedic. She also has degenerative arthritis to her left thumb.  Patent recently started on CPAP machine. Has been following with MD for adjustment with settings. getting new mask  Appt with neurologist 7/2024.  She is s/p follow up with Dr. Combs 5/2024. seeing again 5/24  She also has followed with ENT Dr. Muller many US of cervical LN and then had PET/CT 3/2024 revealing focal hypermetabolic corresponding to soft tissue fullness about the nasopharyngeal soft tissues and tongue base/lingual soft tissues.  Unclear if this is inflammatory versus other process. Saw Dr. Muller and had scope and completed antibiotic course.Had repeat scope and recommend Nasonex. Follow up october   MRI Abd: no suspicious liver lesions. several scattered T2 hyperintense foci representing cysts and hemangiomas. largest 9mm.  MRI Breast: scheduled 3/2024 at [facility] results above  DEXA: 3/2024 osteopenia  PET/CT 3/2024 as above  GYN: Dr. Kebede Uro GYN remains on daily ppx nitrofurantoin for UTI s/s  Colonoscopy: has follow up with Dr Perry, now left and another MD in practice. Looking for another provider.  PCP: Dr. Barrios  Rheum: needs to make appt

## 2024-08-29 NOTE — PHYSICAL EXAM
[Fully active, able to carry on all pre-disease performance without restriction] : Status 0 - Fully active, able to carry on all pre-disease performance without restriction [Normal] : affect appropriate [de-identified] : larger neck girth, no adenopathy appreciated  [de-identified] : b/l mastectomy with silicone implants symmetrical, no obvious masses or lesions, no skin dimpling, no axillary adenopathy, +sensitivity and tenderness to palpation of L side, slight pink tinge to L breast

## 2024-08-29 NOTE — REVIEW OF SYSTEMS
[Fatigue] : fatigue [Recent Change In Weight] : ~T recent weight change [Diarrhea: Grade 1 - Increase of <4 stools per day over baseline; mild increase in ostomy output compared to baseline] : Diarrhea: Grade 1 - Increase of <4 stools per day over baseline; mild increase in ostomy output compared to baseline [Joint Pain] : joint pain [Joint Stiffness] : joint stiffness [Muscle Pain] : muscle pain [Hot Flashes] : hot flashes [Negative] : Allergic/Immunologic [Palpitations] : no palpitations [Shortness Of Breath] : no shortness of breath [Wheezing] : no wheezing [Cough] : no cough [SOB on Exertion] : no shortness of breath during exertion [Abdominal Pain] : no abdominal pain [Anxiety] : no anxiety [FreeTextEntry7] : +persistent loose stools in the mornings takes Imodium s/p CNY  [FreeTextEntry2] : started on CPAP, not tolerating has follow up appointment [FreeTextEntry8] : Taking prophylactic Macrobid for chronic UTI

## 2024-08-29 NOTE — HISTORY OF PRESENT ILLNESS
[de-identified] : Ms Jonas is a 47 year old woman who was diagnosed with a pT1cN1 stage 2a , ER + MD+ HER 2 nicole positive breast cancer in October 2017, after self palpating a lump in the left. She had breast imaging at [facility] in [place]. Left breast biopsy was done at [facility] in [facility].  She had neoadjuvant chemotherapy with Dr Pacheco, 11/2017 TCHP had pCR  followed by a year completion of HP.  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 On b12 shots .  Bilateral mastectomies with reconstruction with Drew at [facility] which revealed no evidence of residual disease. She underwent post-operative radiation therapy with Dr Purdy at [facility].  She underwent menopause during chemotherapy, and then menses resumed 2019.  She then started ovarian suppression with Zoladex in March of 2019. She  then began exemestane in 2019.   Hx of chest pain - [facility]   Began Nerlynx in January of 2020 .  + diarrhea, intermittent toe infections.  She had several breaks in Nerlyx treatments because of infection and respiratory illness.  Tested negative for COVID. Total of 13 weeks of breaks from Nerlynx.  She believes due to complete late April 2021. currently on 4 pills a day   There have been several post operative infections and complication requiring multiple reconstructive surgeries.  Chronic lymphedema of both arms, also has had several episodes of bilateral chest wall cellullitis. hx of uti on spressive nitrofuriton.  hx of high bp   She is here to transfer care.   Sees rheumatology. a lot of joint pains.   PETCT negative for any mets or lesions jan 2021   saw Dr. Combs- did R axilla US due to palpable lymph node ( was not sure if this was r/t J&J shot)  less gi symptoms now off nerlynx( may 2021)    mri breast mammo 1/22/22 [de-identified] : Patient seen and examined today for follow up for the management of history of breast cancer.  She remains on Letrozole and Zoladex. Patient continues to complain of diarrhea but now getting worse - would like to see a GI provider.  Patient recently got a cortisone shot in her Left Big Toe for her degenerative arthritis. Follows with orthopedic. She also has degenerative arthritis to her left thumb.  Patent recently started on CPAP machine. Has been following with MD for adjustment with settings. getting new mask  Appt with neurologist 7/2024.  She is s/p follow up with Dr. Combs 5/2024. seeing again 5/24  She also has followed with ENT Dr. Muller many US of cervical LN and then had PET/CT 3/2024 revealing focal hypermetabolic corresponding to soft tissue fullness about the nasopharyngeal soft tissues and tongue base/lingual soft tissues.  Unclear if this is inflammatory versus other process. Saw Dr. Muller and had scope and completed antibiotic course.Had repeat scope and recommend Nasonex. Follow up october   MRI Abd: no suspicious liver lesions. several scattered T2 hyperintense foci representing cysts and hemangiomas. largest 9mm.  MRI Breast: scheduled 3/2024 at [facility] results above  DEXA: 3/2024 osteopenia  PET/CT 3/2024 as above  GYN: Dr. Kebede Uro GYN remains on daily ppx nitrofurantoin for UTI s/s  Colonoscopy: has follow up with Dr Perry, now left and another MD in practice. Looking for another provider.  PCP: Dr. Barrios  Rheum: needs to make appt

## 2024-08-29 NOTE — ASSESSMENT
[FreeTextEntry1] : #Breast Cancer - s/p neoadjuvant TCHP followed by b/l mastectomy with node dissection negative for residual disease, pCR - s/p 12 doses of Herceptin/Perjeta - PET/CT 1/2021 negative for any evidence of metastases - Completed 1 year of Nerlynx 5/2021 - s/p bx in 11/2021 revealing fat necrosis - Started AI 2019 and switched to Letrozole with monthly Zoladex. She continues to experience persistent stable joint pains diffusely. Patient declines switching to another AI. - s/p CT CAP 12/16/22 no evidence of malignancy with subcentimeter liver lesions - s/p MRI breast 1/2023 BIRADS 2 - s/p MRI Abd 5/2023 no suspicious liver lesions. several scattered T2 hyperintense foci representing cysts and hemangiomas. Largest 9mm. Reviewed. - 6/6/23 vs and CBC reviewed; WBC 4.84, hgb 13.8, plt 245. Stable complaints on Letrozole. Continues to decline switching AI. Continue Letrozole and monthly Zoladex. Due for Zoladex today; proceed. Continue follow up with Dr. Combs, last seen 5/2023, note reviewed. No need for breast imaging. Continue clinical breast exams. - 7/11/23 vs and CBC reviewed; WBC 5.46, hgb 14.1, plt 284. Continue Letrozole and Zoladex. Due for Zoladex today. Patient presents with similar complaints of joint pains, hair loss stable and does not wish to switch AIs or take a break. - 8/8/23 - vs reviewed. labs drawn in office today. wbc 4.32, hgb 13.3, plt 261. continue zoladex and letrozole. complaining of L sided pulling, concerned about implant - will dw Dr. Santana and call Dr. Combs if needed. If it continues will check US of L breast to check on implant. no palpable abnormality noted. - 9/5/23 - vs and labs reviewed. labs drawn in office today. wbc 5.11, hgb 13.9, plts 284.Continue Letrozole and Zoladex. Due for Zoladex today. Patient presents with similar complaints of joint pains, hair loss stable and does not wish to switch AIs or take a break. - 10/10/23 vs and CBC reviewed; WBC 4.59, hgb 13.8, plt 264. Continues on Letrozole and monthly Zoladex. 1 week delayed for Zoladex. She continues to note same systemic complaints. Again discussed switching AIs or taking drug holiday. Patient hesitant and prefers to stay on Letrozole. Has US L breast implant 10/18/23. -11/7/23 - vs and labs reviewed. labs drawn in office today. continue letrozole, calcium and vit D. US reviewed - no evidence of abnormality. If breast pain continues will do MRI - 12/5/23 CBC reviewed; WBC 4.97, hgb 13.7, plt 268. Remains on Letrozole. Stable complaints and does not want to switch. Due for Zoladex today. s/p US L breast with persistent pain and now pain to R side. Will get bilateral MRI breast to assess implants  - 1/30/24 - vs and labs reviewed. cbc reviewed. rescheduled for breast MRI 2/15/24. MRI brain - Unremarkable exam. 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 due for DEXA 3/7/24 - 2/27/24 vs and CBC reivewed; CBC wnl. Continue on Letrozole and Zoladex. Due today. She is s/p MRI brain negative. Since last visit she had 3 skin bx, to back, axilla R, and L breast with derm Dr. Joon Clay path reveals L inframammary combined lentiginous and melanocytic nevus and pigmented SK, Lower back juntional melanocytic nevus similar to clarks nevus, R axilla lentiginous melanocytic nevus. Follow up with derm. She states they then got inected and she had 2 rounds of doxy. She then got COVID s/p paxlovid and then flu s/p tamiflu, prednisone and albuterol and stopped letrozole during that time. She is scheduled for MRI breast 3/2024. Slight pink tinge to L breast, no open wounds, does not appear to look cellulitic. However she did have a needle bx to inframmary fold, ensure no leak to breast implant. MRI ASAP. She is scheduled 3/14/24,  Tricia was tasked to call and get appt sooner  - 4/42/24 vs and CBC reviewed; WBC 6.67, hgb 14.3, plt 300. Continue on Letrozole and Zoladex. Due today.  s/p MRI breast 3/2024 revealing Status post bilateral mastectomy with silicone implant reconstruction with complex radial folds and no MRI evidence of intra or extracapsular implant rupture. No MRI evidence of malignancy. Any further management of the patient's complaints of pain and a palpable lump, including decision to biopsy, should be based on clinical assessment. Please note, the patient reports right rib pain. For evaluation of a rib abnormality, plain film, bone scan and if needed CAT scan can be performed for further evaluation as clinically warranted. She also has followed with ENT Dr. Muller many US of cervical LN and then had PET/CT 3/2024 revealing focal hypermetabolic corresponding to soft tissue fullness about the nasopharyngeal soft tissues and tongue base/lingual soft tissues.  Unclear if this is inflammatory versus other process. Recommend direct visualization tissue sampling if indicated. Seeing Dr. Muller today for possible scope.  - 4/24 - vs and labs reviewed. wbc, hgb and plts wnl. saw Dr. Muller - put on zpack. seeing again 5/24. scoped the last time she was with him. continue zoladex and letrozole.  - 5/28/24 vs and CBC reviewed. Remains on Letrozole and Zoladex. Due today. s/p follow up with Dr. Combs s/p MRI breast notes reviewed and imaging reviewed. States after she saw Dr. Combs noticed "bubble/bump" to R 6:00 breast implant. On PE appears to be fold in implant and edge of implant. Will continue to monitor. All imaging negative. She is seeing us monthly per her preference.  6/24 - vs and labs reviewed. continue with zoladex and letrozole. Saw Dr. Combs on 5/13/24 - reviewed note. 7/25/24 - vs and labs reviewed. labs drawn in office today. wbc, hgb, plts wnl.  continue with zoladex and letrozole. 8/29/24 - vs and labs reviewed. labs drawn in office today. continue with zoladex and letrozole   #Nevi - Had 3 skin bx, to back, axilla R, and L breast with derm Dr. Joon Clay path reveals L inframammary combined lentiginous and melanocytic nevus and pigmented SK, Lower back junctional melanocytic nevus similar to clarks nevus, R axilla lentiginous melanocytic nevus. Follow up with derm. She states they then got infected and she had 2 rounds of doxy. She is scheduled for MRI breast 3/2024. Slight pink tinge to L breast, no open wounds, does not appear to look cellulitic. However she did have a needle bx to inframmary fold, ensure no leak to breast implant. MRI ASAP. She is scheduled 3/14/24,  Tricia was tasked to call and get appt sooner  - 4/2/24 Going  back to derm Dr. Clay for excision of back nevus - 4/30/24 - continue to follow with derm - 5/28/24 was told repeat was negative  7/25/24 - follow up with Dr. Clay - had to reschedule 8/29/24 - follow up with Dr. Clay  #Back pain - XR no suspicious lytic or blastic lesions - MRI L spine 12/7/22 revealing disc protrusion - Again advised follow up with Neurology. Has not done so  #Lymphedema - Re-advised will benefit from PT - Patient has not gone  #Bone Density - DEXA 3/2024 L spine T score -1.3, L femoral neck T score -0.2, L femur T score -0.4. R hip -0.3, R fem neck -0.6. Reviewed compared to 3/2022 minor decrease to bone density to L spine  - Again reviewed monitoring of DEXA q2y with AI use and risk of bone thinning - Continue ca++, vit D and weight bearing exercises. She has not started ca++. Advised 1200mg daily - due 3/2026  #Joint pains - 2/2 AI or Zoladex. Patient declines switching AI. - Continue follow up with Rheum/Neuro/PCP - 6/6/23 stable. Patient does not wish to switch AI or introduce other medications. - 7/11/23 stable pains. Again offered to switch AI or take drug holiday. Patient does not wish to. - 8/8/23 - Continue Letrozole. stable pains - 10/10/23 as above, continues to note systemic complaints does not want to switch AI or take drug holiday - 11/7/23 - stable - 12/5/23 stable, does not want to switch AI  - 1/30/24 - stable, does not want to switch AI  - 2/27/24 stable and does not want to switch AI  - 4/2/24 stable and does not want to switch AI  - 4/30/24 -  stable and does not want to switch AI  - 5/28/24 stable does not want to switch AI  -7/25/24 -  stable does not want to switch AI   #Fatigue - Likely 2/2 lingering s/s of recent viral symptoms and/or AI - Continue to monitor iron studies, B12/folate, TFTs  #Abd Pain/Liver Lesions - s/p CT CAP 12/2022 negative. Noted subcentimeter liver lesions. - Follow up MRI abd for assessment of liver lesions 5/2023 shows 9mm cyst and 2 hemangiomas.Otherwise, no liver lesions. LFT's/ ALK Phos normal. - 5/4/23 MRI abdomen ( [facility])- no suspicious liver lesions. several scattered T2 hyperintense foci representing cysts and hemangiomas. largest 9mm. consider repeat in 1 year to ensure stability. Due 5/2024.  - 4/2/24 of note did have PET/CT 3/2024 PET/CT 3/2024 revealing focal hypermetabolic corresponding to soft tissue fullness about the nasopharyngeal soft tissues and tongue base/lingual soft tissues.  Unclear if this is inflammatory versus other process. Recommend direct visualization tissue sampling if indicated. There was no hypermetabolic activity in chest abd or pelvis  - 5/28/24 last MRI abd 5/2023. Considr 1 yr repeat. Did have recent pet/CT (3/24) no activity abd   #Intermittent HA - Endorses intermittent HA occipital and around eye - No associated neuro s/s - If persistent or worsens can consider MRI brain. Patient hesitant - Continue follow up with Neuro. Due for appt. Has not gone. - 12/5/23 again reviewed to follow up with neuro. HAs persistent and she states she is tracking them. Will order MRI Head - 1/30/24 - reviewed MRI brain from 1/22/24- Unremarkable exam. 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 - 5/28/24 seeing neuro 7/2024 7/24 - saw Dr. Cox  #Diarrhea - Worse in morning - Patient attributes to Letrozole use - Imodium PRN - BRAT diet - s/p eval with Dr. Verdugo 5/24/23, note reviewed. Scheduled for [facility] 10/2023. Celiac testing completed. - s/p CNY 10/2023 still needs to leave stool sample. Patient reports she was advised Dr. Verdugo will be leaving. Given names of other GIs in the practice - still needs to follow with another GI   #Hair thinning - 2/2 AI use with Letrozole - Offered Minoxidil 2.5mg PO. Patient declines.  #Weight Gain - 2/2 AI use. Encouraged walking and exercise. Patient does not wish to switch AI.  #Recurrent Infections - IgG wnl.  #Sleep Apnea - s/p sleep study confirmed sleep apnea  CPAP  - follows with Dr Richard Fernandez   #arthritis sees hand and foot ortho receiving cortisone injection  #Health Maintenance - UroGYN: Dr. Carranza continues on ppx nitrofurantoin on and off, currently off. Needs to make follow up. Also taking cranberry - CNY 10/2023 - needs to find GI - PCP Dr. Barrios. Continue follow up as instructed. - Neuro Dr. Cox. appt 7/2024  - Dr. Muller ENT has repeat scope scheduled today s/p abx and s/p eval with PET as above   RTC in 1 month with next monthly Zoladex with CBC with diff, CMP, UA, irons, b12/folate, vit D, TSH, FT4, Again reiterated and Discussed with patient spacing out appts for RPA q3mos. Patient wishes to come monthly for RPA with monthly Zoladex injections.

## 2024-09-24 ENCOUNTER — RESULT REVIEW (OUTPATIENT)
Age: 49
End: 2024-09-24

## 2024-09-24 ENCOUNTER — LABORATORY RESULT (OUTPATIENT)
Age: 49
End: 2024-09-24

## 2024-09-24 ENCOUNTER — NON-APPOINTMENT (OUTPATIENT)
Age: 49
End: 2024-09-24

## 2024-09-24 ENCOUNTER — APPOINTMENT (OUTPATIENT)
Dept: HEMATOLOGY ONCOLOGY | Facility: CLINIC | Age: 49
End: 2024-09-24
Payer: COMMERCIAL

## 2024-09-24 VITALS
SYSTOLIC BLOOD PRESSURE: 142 MMHG | WEIGHT: 202.7 LBS | HEIGHT: 63 IN | DIASTOLIC BLOOD PRESSURE: 91 MMHG | HEART RATE: 78 BPM | RESPIRATION RATE: 18 BRPM | OXYGEN SATURATION: 98 % | TEMPERATURE: 98 F | BODY MASS INDEX: 35.91 KG/M2

## 2024-09-24 DIAGNOSIS — C77.3 MALIGNANT NEOPLASM OF UNSPECIFIED SITE OF LEFT FEMALE BREAST: ICD-10-CM

## 2024-09-24 DIAGNOSIS — C50.912 MALIGNANT NEOPLASM OF UNSPECIFIED SITE OF LEFT FEMALE BREAST: ICD-10-CM

## 2024-09-24 PROCEDURE — 99214 OFFICE O/P EST MOD 30 MIN: CPT

## 2024-09-27 ENCOUNTER — NON-APPOINTMENT (OUTPATIENT)
Age: 49
End: 2024-09-27

## 2024-09-27 NOTE — ASSESSMENT
[FreeTextEntry1] : #Breast Cancer - s/p neoadjuvant TCHP followed by b/l mastectomy with node dissection negative for residual disease, pCR - s/p 12 doses of Herceptin/Perjeta - PET/CT 1/2021 negative for any evidence of metastases - Completed 1 year of Nerlynx 5/2021 - s/p bx in 11/2021 revealing fat necrosis - Started AI 2019 and switched to Letrozole with monthly Zoladex. She continues to experience persistent stable joint pains diffusely. Patient declines switching to another AI. - s/p CT CAP 12/16/22 no evidence of malignancy with subcentimeter liver lesions - s/p MRI breast 1/2023 BIRADS 2 - s/p MRI Abd 5/2023 no suspicious liver lesions. several scattered T2 hyperintense foci representing cysts and hemangiomas. Largest 9mm. Reviewed. - 6/6/23 vs and CBC reviewed; WBC 4.84, hgb 13.8, plt 245. Stable complaints on Letrozole. Continues to decline switching AI. Continue Letrozole and monthly Zoladex. Due for Zoladex today; proceed. Continue follow up with Dr. Combs, last seen 5/2023, note reviewed. No need for breast imaging. Continue clinical breast exams. - 7/11/23 vs and CBC reviewed; WBC 5.46, hgb 14.1, plt 284. Continue Letrozole and Zoladex. Due for Zoladex today. Patient presents with similar complaints of joint pains, hair loss stable and does not wish to switch AIs or take a break. - 8/8/23 - vs reviewed. labs drawn in office today. wbc 4.32, hgb 13.3, plt 261. continue zoladex and letrozole. complaining of L sided pulling, concerned about implant - will dw Dr. Santana and call Dr. Combs if needed. If it continues will check US of L breast to check on implant. no palpable abnormality noted. - 9/5/23 - vs and labs reviewed. labs drawn in office today. wbc 5.11, hgb 13.9, plts 284.Continue Letrozole and Zoladex. Due for Zoladex today. Patient presents with similar complaints of joint pains, hair loss stable and does not wish to switch AIs or take a break. - 10/10/23 vs and CBC reviewed; WBC 4.59, hgb 13.8, plt 264. Continues on Letrozole and monthly Zoladex. 1 week delayed for Zoladex. She continues to note same systemic complaints. Again discussed switching AIs or taking drug holiday. Patient hesitant and prefers to stay on Letrozole. Has US L breast implant 10/18/23. -11/7/23 - vs and labs reviewed. labs drawn in office today. continue letrozole, calcium and vit D. US reviewed - no evidence of abnormality. If breast pain continues will do MRI - 12/5/23 CBC reviewed; WBC 4.97, hgb 13.7, plt 268. Remains on Letrozole. Stable complaints and does not want to switch. Due for Zoladex today. s/p US L breast with persistent pain and now pain to R side. Will get bilateral MRI breast to assess implants  - 1/30/24 - vs and labs reviewed. cbc reviewed. rescheduled for breast MRI 2/15/24. MRI brain - Unremarkable exam. 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 due for DEXA 3/7/24 - 2/27/24 vs and CBC reivewed; CBC wnl. Continue on Letrozole and Zoladex. Due today. She is s/p MRI brain negative. Since last visit she had 3 skin bx, to back, axilla R, and L breast with derm Dr. Joon Clay path reveals L inframammary combined lentiginous and melanocytic nevus and pigmented SK, Lower back juntional melanocytic nevus similar to clarks nevus, R axilla lentiginous melanocytic nevus. Follow up with derm. She states they then got inected and she had 2 rounds of doxy. She then got COVID s/p paxlovid and then flu s/p tamiflu, prednisone and albuterol and stopped letrozole during that time. She is scheduled for MRI breast 3/2024. Slight pink tinge to L breast, no open wounds, does not appear to look cellulitic. However she did have a needle bx to inframmary fold, ensure no leak to breast implant. MRI ASAP. She is scheduled 3/14/24,  Tricia was tasked to call and get appt sooner  - 4/42/24 vs and CBC reviewed; WBC 6.67, hgb 14.3, plt 300. Continue on Letrozole and Zoladex. Due today.  s/p MRI breast 3/2024 revealing Status post bilateral mastectomy with silicone implant reconstruction with complex radial folds and no MRI evidence of intra or extracapsular implant rupture. No MRI evidence of malignancy. Any further management of the patient's complaints of pain and a palpable lump, including decision to biopsy, should be based on clinical assessment. Please note, the patient reports right rib pain. For evaluation of a rib abnormality, plain film, bone scan and if needed CAT scan can be performed for further evaluation as clinically warranted. She also has followed with ENT Dr. Muller many US of cervical LN and then had PET/CT 3/2024 revealing focal hypermetabolic corresponding to soft tissue fullness about the nasopharyngeal soft tissues and tongue base/lingual soft tissues.  Unclear if this is inflammatory versus other process. Recommend direct visualization tissue sampling if indicated. Seeing Dr. Muller today for possible scope.  - 4/24 - vs and labs reviewed. wbc, hgb and plts wnl. saw Dr. Muller - put on zpack. seeing again 5/24. scoped the last time she was with him. continue zoladex and letrozole.  - 5/28/24 vs and CBC reviewed. Remains on Letrozole and Zoladex. Due today. s/p follow up with Dr. Combs s/p MRI breast notes reviewed and imaging reviewed. States after she saw Dr. Combs noticed "bubble/bump" to R 6:00 breast implant. On PE appears to be fold in implant and edge of implant. Will continue to monitor. All imaging negative. She is seeing us monthly per her preference.  - 6/24 - vs and labs reviewed. continue with zoladex and letrozole. Saw Dr. Combs on 5/13/24 - reviewed note. - 7/25/24 - vs and labs reviewed. labs drawn in office today. wbc, hgb, plts wnl.  continue with zoladex and letrozole. - 8/29/24 - vs and labs reviewed. labs drawn in office today. continue with zoladex and letrozole  - 9/2024 vs and labs reviewed. Proceed with letrozole and zoladex. Same s/s. Continues to feel ridge of implant. all breast imaging has been negative. she got a letter in mail regarding US?. Reached out to Dr. Combs's office.   #Nevi - Had 3 skin bx, to back, axilla R, and L breast with derm Dr. Joon Clay path reveals L inframammary combined lentiginous and melanocytic nevus and pigmented SK, Lower back junctional melanocytic nevus similar to clarks nevus, R axilla lentiginous melanocytic nevus. Follow up with derm. She states they then got infected and she had 2 rounds of doxy. She is scheduled for MRI breast 3/2024. Slight pink tinge to L breast, no open wounds, does not appear to look cellulitic. However she did have a needle bx to inframmary fold, ensure no leak to breast implant. MRI ASAP. She is scheduled 3/14/24,  Tricia was tasked to call and get appt sooner  - 4/2/24 Going  back to derm Dr. Clay for excision of back nevus - 4/30/24 - continue to follow with derm - 5/28/24 was told repeat was negative  - 7/25/24 - follow up with Dr. Clay - had to reschedule - 8/29/24 - follow up with Dr. Clay - 9/2024 has upcoming follow up  #Back pain - XR no suspicious lytic or blastic lesions - MRI L spine 12/7/22 revealing disc protrusion - Again advised follow up with Neurology. Has not done so has appt 11/2024   #Lymphedema - Re-advised will benefit from PT - Patient has not gone  #Bone Density - DEXA 3/2024 L spine T score -1.3, L femoral neck T score -0.2, L femur T score -0.4. R hip -0.3, R fem neck -0.6. Reviewed compared to 3/2022 minor decrease to bone density to L spine  - Again reviewed monitoring of DEXA q2y with AI use and risk of bone thinning - Continue ca++, vit D and weight bearing exercises. She has not started ca++. Advised 1200mg daily - due 3/2026  #Joint pains - 2/2 AI or Zoladex. Patient declines switching AI. - Continue follow up with Rheum/Neuro/PCP - 6/6/23 stable. Patient does not wish to switch AI or introduce other medications. - 7/11/23 stable pains. Again offered to switch AI or take drug holiday. Patient does not wish to. - 8/8/23 - Continue Letrozole. stable pains - 10/10/23 as above, continues to note systemic complaints does not want to switch AI or take drug holiday - 11/7/23 - stable - 12/5/23 stable, does not want to switch AI  - 1/30/24 - stable, does not want to switch AI  - 2/27/24 stable and does not want to switch AI  - 4/2/24 stable and does not want to switch AI  - 4/30/24 -  stable and does not want to switch AI  - 5/28/24 stable does not want to switch AI  -7/25/24 -  stable does not want to switch AI  - 9/2024 stable   #Fatigue - Likely 2/2 lingering s/s of recent viral symptoms and/or AI - Continue to monitor iron studies, B12/folate, TFTs  #Abd Pain/Liver Lesions - s/p CT CAP 12/2022 negative. Noted subcentimeter liver lesions. - Follow up MRI abd for assessment of liver lesions 5/2023 shows 9mm cyst and 2 hemangiomas.Otherwise, no liver lesions. LFT's/ ALK Phos normal. - 5/4/23 MRI abdomen ( [facility])- no suspicious liver lesions. several scattered T2 hyperintense foci representing cysts and hemangiomas. largest 9mm. consider repeat in 1 year to ensure stability. Due 5/2024.  - 4/2/24 of note did have PET/CT 3/2024 PET/CT 3/2024 revealing focal hypermetabolic corresponding to soft tissue fullness about the nasopharyngeal soft tissues and tongue base/lingual soft tissues.  Unclear if this is inflammatory versus other process. Recommend direct visualization tissue sampling if indicated. There was no hypermetabolic activity in chest abd or pelvis  - 5/28/24 last MRI abd 5/2023. Did have recent pet/CT (3/24) no activity abd   #Intermittent HA - Endorses intermittent HA occipital and around eye - No associated neuro s/s - If persistent or worsens can consider MRI brain. Patient hesitant - Continue follow up with Neuro. Due for appt. Has not gone. - 12/5/23 again reviewed to follow up with neuro. HAs persistent and she states she is tracking them. Will order MRI Head - 1/30/24 - reviewed MRI brain from 1/22/24- Unremarkable exam. 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 - 5/28/24 seeing neuro 7/2024  - 7/24 - saw Dr. Cox - has appt 11/2024   #Diarrhea - Worse in morning - Patient attributes to Letrozole use - Imodium PRN - BRAT diet - s/p eval with Dr. Verdugo 5/24/23, note reviewed. Scheduled for [facility] 10/2023. Celiac testing completed. - s/p CNY 10/2023 still needs to leave stool sample. Patient reports she was advised Dr. Verdugo will be leaving. Given names of other GIs in the practice - still needs to follow with another GI reminded   #Hair thinning - 2/2 AI use with Letrozole - Offered Minoxidil 2.5mg PO. Patient declines.  #Weight Gain - 2/2 AI use. Encouraged walking and exercise. Patient does not wish to switch AI.  #Recurrent Infections - IgG wnl.  #Sleep Apnea - s/p sleep study confirmed sleep apnea  - CPAP  - follows with Dr Richard Fernandez   #arthritis - sees hand and foot ortho - receiving cortisone injection  #Health Maintenance - UroGYN: Dr. Carranza continues on ppx nitrofurantoin on and off, currently off. Needs to make follow up. Also taking cranberry - CNY 10/2023 - needs to find GI - PCP Dr. Barrios. Continue follow up as instructed. - Neuro Dr. Cox. appt 7/2024  - Dr. Muller ENT has repeat scope scheduled today s/p abx and s/p eval with PET as above   RTC in 1 month with next monthly Zoladex with CBC with diff, CMP, UA, irons, b12/folate, vit D, TSH, FT4, Again reiterated and Discussed with patient spacing out appts for RPA q3mos. Patient wishes to come monthly for RPA with monthly Zoladex injections.  case and management discussed with dr. desai

## 2024-09-27 NOTE — HISTORY OF PRESENT ILLNESS
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 She had breast imaging at [facility] in [place]. [place] [facility] [place] [facility] [place]  She had neoadjuvant chemotherapy with Dr Pacheco, 11/2017 [facility] had pCR  followed by a year completion of HP.  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 On b12 shots .  Bilateral mastectomies with reconstruction with Drew at [facility] which revealed no evidence of residual disease. She underwent post-operative radiation therapy with Dr Purdy at [facility].  She underwent menopause during chemotherapy, and then menses resumed 2019.  She then started ovarian suppression with Zoladex in March of 2019. She  then began exemestane in 2019.   Hx of chest pain - [facility]   Began Nerlynx in January of 2020 .  + diarrhea, intermittent toe infections.  She had several breaks in Nerlyx treatments because of infection and respiratory illness.  Tested negative for COVID. Total of 13 weeks of breaks from Nerlynx.  She believes due to complete late April 2021. currently on 4 pills a day   There have been several post operative infections and complication requiring multiple reconstructive surgeries.  Chronic lymphedema of both arms, also has had several episodes of bilateral chest wall cellullitis. hx of uti on spressive nitrofuriton.  hx of high bp   She is here to transfer care.   Sees rheumatology. a lot of joint pains.   PETCT negative for any mets or lesions jan 2021   saw Dr. Combs- did R axilla US due to palpable lymph node ( was not sure if this was r/t J&J shot)  less gi symptoms now off nerlynx( may 2021)    mri breast mammo 1/22/22 [de-identified] : Patient seen and examined today for follow up for the management of history of breast cancer. She remains on Letrozole and Zoladex. Patient continues to complain of diarrhea but now getting worse - would like to see a GI provider. has not made appt. Continues to get cortisone injections now to hands with specialist. She is trying to coordinate her CPAP with Dr. Ramey has appt 11/2024. Has neuro follow up 11/2024. ENT next appt 11/2024.She is s/p follow up with Dr. Combs 5/2024. seeing again 5/25. Upcoming appt with derm. She states she still periodically feels the implant "bubble" at the edge of her implant that has been there for many months. No s/s of rupture.   MRI Abd: no suspicious liver lesions. several scattered T2 hyperintense foci representing cysts and hemangiomas. largest 9mm.  MRI Breast: 3/2024  DEXA: 3/2024 osteopenia  PET/CT 3/2024 as above  GYN: Dr. Kebede [facility] remains on daily ppx nitrofurantoin for UTI s/s  Colonoscopy: has follow up with Dr Perry, now left and another MD in practice. Looking for another provider.  PCP: Dr. Barrios  Rheum: needs to make appt

## 2024-09-27 NOTE — REVIEW OF SYSTEMS
[Fatigue] : fatigue [Recent Change In Weight] : ~T recent weight change [Diarrhea: Grade 1 - Increase of <4 stools per day over baseline; mild increase in ostomy output compared to baseline] : Diarrhea: Grade 1 - Increase of <4 stools per day over baseline; mild increase in ostomy output compared to baseline [Joint Pain] : joint pain [Joint Stiffness] : joint stiffness [Muscle Pain] : muscle pain [Hot Flashes] : hot flashes [Negative] : Allergic/Immunologic [Palpitations] : no palpitations [Shortness Of Breath] : no shortness of breath [Wheezing] : no wheezing [Cough] : no cough [SOB on Exertion] : no shortness of breath during exertion [Abdominal Pain] : no abdominal pain [Anxiety] : no anxiety [FreeTextEntry2] : started on CPAP, not tolerating has follow up appointment [FreeTextEntry7] : +persistent loose stools in the mornings takes Imodium s/p CNY  [FreeTextEntry8] : Taking prophylactic Macrobid for chronic UTI "I'm tired."

## 2024-09-27 NOTE — PHYSICAL EXAM
[Fully active, able to carry on all pre-disease performance without restriction] : Status 0 - Fully active, able to carry on all pre-disease performance without restriction [Normal] : affect appropriate [de-identified] : larger neck girth, no adenopathy appreciated  [de-identified] : b/l mastectomy with silicone implants symmetrical, no obvious masses or lesions, no skin dimpling, no axillary adenopathy, +ridges to implant felt

## 2024-09-27 NOTE — ASSESSMENT
[FreeTextEntry1] : #Breast Cancer - s/p neoadjuvant TCHP followed by b/l mastectomy with node dissection negative for residual disease, pCR - s/p 12 doses of Herceptin/Perjeta - PET/CT 1/2021 negative for any evidence of metastases - Completed 1 year of Nerlynx 5/2021 - s/p bx in 11/2021 revealing fat necrosis - Started AI 2019 and switched to Letrozole with monthly Zoladex. She continues to experience persistent stable joint pains diffusely. Patient declines switching to another AI. - s/p CT CAP 12/16/22 no evidence of malignancy with subcentimeter liver lesions - s/p MRI breast 1/2023 BIRADS 2 - s/p MRI Abd 5/2023 no suspicious liver lesions. several scattered T2 hyperintense foci representing cysts and hemangiomas. Largest 9mm. Reviewed. - 6/6/23 vs and CBC reviewed; WBC 4.84, hgb 13.8, plt 245. Stable complaints on Letrozole. Continues to decline switching AI. Continue Letrozole and monthly Zoladex. Due for Zoladex today; proceed. Continue follow up with Dr. Combs, last seen 5/2023, note reviewed. No need for breast imaging. Continue clinical breast exams. - 7/11/23 vs and CBC reviewed; WBC 5.46, hgb 14.1, plt 284. Continue Letrozole and Zoladex. Due for Zoladex today. Patient presents with similar complaints of joint pains, hair loss stable and does not wish to switch AIs or take a break. - 8/8/23 - vs reviewed. labs drawn in office today. wbc 4.32, hgb 13.3, plt 261. continue zoladex and letrozole. complaining of L sided pulling, concerned about implant - will dw Dr. Santana and call Dr. Combs if needed. If it continues will check US of L breast to check on implant. no palpable abnormality noted. - 9/5/23 - vs and labs reviewed. labs drawn in office today. wbc 5.11, hgb 13.9, plts 284.Continue Letrozole and Zoladex. Due for Zoladex today. Patient presents with similar complaints of joint pains, hair loss stable and does not wish to switch AIs or take a break. - 10/10/23 vs and CBC reviewed; WBC 4.59, hgb 13.8, plt 264. Continues on Letrozole and monthly Zoladex. 1 week delayed for Zoladex. She continues to note same systemic complaints. Again discussed switching AIs or taking drug holiday. Patient hesitant and prefers to stay on Letrozole. Has US L breast implant 10/18/23. -11/7/23 - vs and labs reviewed. labs drawn in office today. continue letrozole, calcium and vit D. US reviewed - no evidence of abnormality. If breast pain continues will do MRI - 12/5/23 CBC reviewed; WBC 4.97, hgb 13.7, plt 268. Remains on Letrozole. Stable complaints and does not want to switch. Due for Zoladex today. s/p US L breast with persistent pain and now pain to R side. Will get bilateral MRI breast to assess implants  - 1/30/24 - vs and labs reviewed. cbc reviewed. rescheduled for breast MRI 2/15/24. MRI brain - Unremarkable exam. 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 due for DEXA 3/7/24 - 2/27/24 vs and CBC reivewed; CBC wnl. Continue on Letrozole and Zoladex. Due today. She is s/p MRI brain negative. Since last visit she had 3 skin bx, to back, axilla R, and L breast with derm Dr. Joon Clay path reveals L inframammary combined lentiginous and melanocytic nevus and pigmented SK, Lower back juntional melanocytic nevus similar to clarks nevus, R axilla lentiginous melanocytic nevus. Follow up with derm. She states they then got inected and she had 2 rounds of doxy. She then got COVID s/p paxlovid and then flu s/p tamiflu, prednisone and albuterol and stopped letrozole during that time. She is scheduled for MRI breast 3/2024. Slight pink tinge to L breast, no open wounds, does not appear to look cellulitic. However she did have a needle bx to inframmary fold, ensure no leak to breast implant. MRI ASAP. She is scheduled 3/14/24,  Tricia was tasked to call and get appt sooner  - 4/42/24 vs and CBC reviewed; WBC 6.67, hgb 14.3, plt 300. Continue on Letrozole and Zoladex. Due today.  s/p MRI breast 3/2024 revealing Status post bilateral mastectomy with silicone implant reconstruction with complex radial folds and no MRI evidence of intra or extracapsular implant rupture. No MRI evidence of malignancy. Any further management of the patient's complaints of pain and a palpable lump, including decision to biopsy, should be based on clinical assessment. Please note, the patient reports right rib pain. For evaluation of a rib abnormality, plain film, bone scan and if needed CAT scan can be performed for further evaluation as clinically warranted. She also has followed with ENT Dr. Muller many US of cervical LN and then had PET/CT 3/2024 revealing focal hypermetabolic corresponding to soft tissue fullness about the nasopharyngeal soft tissues and tongue base/lingual soft tissues.  Unclear if this is inflammatory versus other process. Recommend direct visualization tissue sampling if indicated. Seeing Dr. Muller today for possible scope.  - 4/24 - vs and labs reviewed. wbc, hgb and plts wnl. saw Dr. Muller - put on zpack. seeing again 5/24. scoped the last time she was with him. continue zoladex and letrozole.  - 5/28/24 vs and CBC reviewed. Remains on Letrozole and Zoladex. Due today. s/p follow up with Dr. Combs s/p MRI breast notes reviewed and imaging reviewed. States after she saw Dr. Combs noticed "bubble/bump" to R 6:00 breast implant. On PE appears to be fold in implant and edge of implant. Will continue to monitor. All imaging negative. She is seeing us monthly per her preference.  - 6/24 - vs and labs reviewed. continue with zoladex and letrozole. Saw Dr. Combs on 5/13/24 - reviewed note. - 7/25/24 - vs and labs reviewed. labs drawn in office today. wbc, hgb, plts wnl.  continue with zoladex and letrozole. - 8/29/24 - vs and labs reviewed. labs drawn in office today. continue with zoladex and letrozole  - 9/2024 vs and labs reviewed. Proceed with letrozole and zoladex. Same s/s. Continues to feel ridge of implant. all breast imaging has been negative. she got a letter in mail regarding US?. Reached out to Dr. Combs's office.   #Nevi - Had 3 skin bx, to back, axilla R, and L breast with derm Dr. Joon Clay path reveals L inframammary combined lentiginous and melanocytic nevus and pigmented SK, Lower back junctional melanocytic nevus similar to clarks nevus, R axilla lentiginous melanocytic nevus. Follow up with derm. She states they then got infected and she had 2 rounds of doxy. She is scheduled for MRI breast 3/2024. Slight pink tinge to L breast, no open wounds, does not appear to look cellulitic. However she did have a needle bx to inframmary fold, ensure no leak to breast implant. MRI ASAP. She is scheduled 3/14/24,  Tricia was tasked to call and get appt sooner  - 4/2/24 Going  back to derm Dr. Clay for excision of back nevus - 4/30/24 - continue to follow with derm - 5/28/24 was told repeat was negative  - 7/25/24 - follow up with Dr. Clay - had to reschedule - 8/29/24 - follow up with Dr. Clay - 9/2024 has upcoming follow up  #Back pain - XR no suspicious lytic or blastic lesions - MRI L spine 12/7/22 revealing disc protrusion - Again advised follow up with Neurology. Has not done so has appt 11/2024   #Lymphedema - Re-advised will benefit from PT - Patient has not gone  #Bone Density - DEXA 3/2024 L spine T score -1.3, L femoral neck T score -0.2, L femur T score -0.4. R hip -0.3, R fem neck -0.6. Reviewed compared to 3/2022 minor decrease to bone density to L spine  - Again reviewed monitoring of DEXA q2y with AI use and risk of bone thinning - Continue ca++, vit D and weight bearing exercises. She has not started ca++. Advised 1200mg daily - due 3/2026  #Joint pains - 2/2 AI or Zoladex. Patient declines switching AI. - Continue follow up with Rheum/Neuro/PCP - 6/6/23 stable. Patient does not wish to switch AI or introduce other medications. - 7/11/23 stable pains. Again offered to switch AI or take drug holiday. Patient does not wish to. - 8/8/23 - Continue Letrozole. stable pains - 10/10/23 as above, continues to note systemic complaints does not want to switch AI or take drug holiday - 11/7/23 - stable - 12/5/23 stable, does not want to switch AI  - 1/30/24 - stable, does not want to switch AI  - 2/27/24 stable and does not want to switch AI  - 4/2/24 stable and does not want to switch AI  - 4/30/24 -  stable and does not want to switch AI  - 5/28/24 stable does not want to switch AI  -7/25/24 -  stable does not want to switch AI  - 9/2024 stable   #Fatigue - Likely 2/2 lingering s/s of recent viral symptoms and/or AI - Continue to monitor iron studies, B12/folate, TFTs  #Abd Pain/Liver Lesions - s/p CT CAP 12/2022 negative. Noted subcentimeter liver lesions. - Follow up MRI abd for assessment of liver lesions 5/2023 shows 9mm cyst and 2 hemangiomas.Otherwise, no liver lesions. LFT's/ ALK Phos normal. - 5/4/23 MRI abdomen ( [facility])- no suspicious liver lesions. several scattered T2 hyperintense foci representing cysts and hemangiomas. largest 9mm. consider repeat in 1 year to ensure stability. Due 5/2024.  - 4/2/24 of note did have PET/CT 3/2024 PET/CT 3/2024 revealing focal hypermetabolic corresponding to soft tissue fullness about the nasopharyngeal soft tissues and tongue base/lingual soft tissues.  Unclear if this is inflammatory versus other process. Recommend direct visualization tissue sampling if indicated. There was no hypermetabolic activity in chest abd or pelvis  - 5/28/24 last MRI abd 5/2023. Did have recent pet/CT (3/24) no activity abd   #Intermittent HA - Endorses intermittent HA occipital and around eye - No associated neuro s/s - If persistent or worsens can consider MRI brain. Patient hesitant - Continue follow up with Neuro. Due for appt. Has not gone. - 12/5/23 again reviewed to follow up with neuro. HAs persistent and she states she is tracking them. Will order MRI Head - 1/30/24 - reviewed MRI brain from 1/22/24- Unremarkable exam. 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 - 5/28/24 seeing neuro 7/2024  - 7/24 - saw Dr. Cox - has appt 11/2024   #Diarrhea - Worse in morning - Patient attributes to Letrozole use - Imodium PRN - BRAT diet - s/p eval with Dr. Verdugo 5/24/23, note reviewed. Scheduled for CNY 10/2023. Celiac testing completed. - s/p CNY 10/2023 still needs to leave stool sample. Patient reports she was advised Dr. Verdugo will be leaving. Given names of other GIs in the practice - still needs to follow with another GI reminded   #Hair thinning - 2/2 AI use with Letrozole - Offered Minoxidil 2.5mg PO. Patient declines.  #Weight Gain - 2/2 AI use. Encouraged walking and exercise. Patient does not wish to switch AI.  #Recurrent Infections - IgG wnl.  #Sleep Apnea - s/p sleep study confirmed sleep apnea  - CPAP  - follows with Dr Richard Fernandez   #arthritis - sees hand and foot ortho - receiving cortisone injection  #Health Maintenance - UroGYN: Dr. Carranza continues on ppx nitrofurantoin on and off, currently off. Needs to make follow up. Also taking cranberry - CNY 10/2023 - needs to find GI - PCP Dr. Barrios. Continue follow up as instructed. - Neuro Dr. Cox. appt 7/2024  - Dr. Muller ENT has repeat scope scheduled today s/p abx and s/p eval with PET as above   RTC in 1 month with next monthly Zoladex with CBC with diff, CMP, UA, irons, b12/folate, vit D, TSH, FT4, Again reiterated and Discussed with patient spacing out appts for RPA q3mos. Patient wishes to come monthly for RPA with monthly Zoladex injections.  case and management discussed with dr. desai

## 2024-09-27 NOTE — PHYSICAL EXAM
[Fully active, able to carry on all pre-disease performance without restriction] : Status 0 - Fully active, able to carry on all pre-disease performance without restriction [Normal] : affect appropriate [de-identified] : larger neck girth, no adenopathy appreciated  [de-identified] : b/l mastectomy with silicone implants symmetrical, no obvious masses or lesions, no skin dimpling, no axillary adenopathy, +ridges to implant felt

## 2024-09-27 NOTE — HISTORY OF PRESENT ILLNESS
[de-identified] : Ms Jonas is a 47 year old woman who was diagnosed with a pT1cN1 stage 2a , ER + [place]+ HER 2 nicole positive breast cancer in October 2017, after self palpating a lump in the left. She had breast imaging at [facility] in [place]. Left breast biopsy was done at [facility] in [facility].  She had neoadjuvant chemotherapy with Dr Pacheco, 11/2017 [facility] had pCR  followed by a year completion of HP.  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 On b12 shots .  Bilateral mastectomies with reconstruction with Drew at [facility] which revealed no evidence of residual disease. She underwent post-operative radiation therapy with Dr Purdy at [facility].  She underwent menopause during chemotherapy, and then menses resumed 2019.  She then started ovarian suppression with Zoladex in March of 2019. She  then began exemestane in 2019.   Hx of chest pain - [facility]   Began Nerlynx in January of 2020 .  + diarrhea, intermittent toe infections.  She had several breaks in Nerlyx treatments because of infection and respiratory illness.  Tested negative for COVID. Total of 13 weeks of breaks from Nerlynx.  She believes due to complete late April 2021. currently on 4 pills a day   There have been several post operative infections and complication requiring multiple reconstructive surgeries.  Chronic lymphedema of both arms, also has had several episodes of bilateral chest wall cellullitis. hx of uti on spressive nitrofuriton.  hx of high bp   She is here to transfer care.   Sees rheumatology. a lot of joint pains.   PETCT negative for any mets or lesions jan 2021   saw Dr. Combs- did R axilla US due to palpable lymph node ( was not sure if this was r/t J&J shot)  less gi symptoms now off nerlynx( may 2021)    mri breast mammo 1/22/22 [de-identified] : Patient seen and examined today for follow up for the management of history of breast cancer. She remains on Letrozole and Zoladex. Patient continues to complain of diarrhea but now getting worse - would like to see a GI provider. has not made appt. Continues to get cortisone injections now to hands with specialist. She is trying to coordinate her CPAP with Dr. Ramey has appt 11/2024. Has neuro follow up 11/2024. ENT next appt 11/2024.She is s/p follow up with Dr. Combs 5/2024. seeing again 5/25. Upcoming appt with derm. She states she still periodically feels the implant "bubble" at the edge of her implant that has been there for many months. No s/s of rupture.   MRI Abd: no suspicious liver lesions. several scattered T2 hyperintense foci representing cysts and hemangiomas. largest 9mm.  MRI Breast: 3/2024  DEXA: 3/2024 osteopenia  PET/CT 3/2024 as above  GYN: Dr. Kebede [facility]ro GYN remains on daily ppx nitrofurantoin for UTI s/s  Colonoscopy: has follow up with Dr Perry, now left and another MD in practice. Looking for another provider.  PCP: Dr. Barrios  Rheum: needs to make appt

## 2024-10-16 ENCOUNTER — APPOINTMENT (OUTPATIENT)
Dept: BREAST CENTER | Facility: CLINIC | Age: 49
End: 2024-10-16
Payer: COMMERCIAL

## 2024-10-16 DIAGNOSIS — I89.0 LYMPHEDEMA, NOT ELSEWHERE CLASSIFIED: ICD-10-CM

## 2024-10-16 DIAGNOSIS — Z12.39 ENCOUNTER FOR OTHER SCREENING FOR MALIGNANT NEOPLASM OF BREAST: ICD-10-CM

## 2024-10-16 DIAGNOSIS — Z85.3 PERSONAL HISTORY OF MALIGNANT NEOPLASM OF BREAST: ICD-10-CM

## 2024-10-16 PROCEDURE — 99213 OFFICE O/P EST LOW 20 MIN: CPT

## 2024-10-18 DIAGNOSIS — N64.1 FAT NECROSIS OF BREAST: ICD-10-CM

## 2024-10-18 DIAGNOSIS — C50.912 MALIGNANT NEOPLASM OF UNSPECIFIED SITE OF LEFT FEMALE BREAST: ICD-10-CM

## 2024-10-18 DIAGNOSIS — Z80.3 FAMILY HISTORY OF MALIGNANT NEOPLASM OF BREAST: ICD-10-CM

## 2024-10-18 DIAGNOSIS — E55.9 VITAMIN D DEFICIENCY, UNSPECIFIED: ICD-10-CM

## 2024-10-18 DIAGNOSIS — K52.9 NONINFECTIVE GASTROENTERITIS AND COLITIS, UNSPECIFIED: ICD-10-CM

## 2024-10-18 DIAGNOSIS — R53.83 OTHER FATIGUE: ICD-10-CM

## 2024-10-18 DIAGNOSIS — Z90.13 ACQUIRED ABSENCE OF BILATERAL BREASTS AND NIPPLES: ICD-10-CM

## 2024-10-18 DIAGNOSIS — R59.0 LOCALIZED ENLARGED LYMPH NODES: ICD-10-CM

## 2024-10-18 DIAGNOSIS — E53.8 DEFICIENCY OF OTHER SPECIFIED B GROUP VITAMINS: ICD-10-CM

## 2024-10-18 DIAGNOSIS — M06.9 RHEUMATOID ARTHRITIS, UNSPECIFIED: ICD-10-CM

## 2024-10-18 DIAGNOSIS — R39.9 UNSPECIFIED SYMPTOMS AND SIGNS INVOLVING THE GENITOURINARY SYSTEM: ICD-10-CM

## 2024-10-22 ENCOUNTER — LABORATORY RESULT (OUTPATIENT)
Age: 49
End: 2024-10-22

## 2024-10-22 ENCOUNTER — RESULT REVIEW (OUTPATIENT)
Age: 49
End: 2024-10-22

## 2024-10-22 ENCOUNTER — APPOINTMENT (OUTPATIENT)
Dept: HEMATOLOGY ONCOLOGY | Facility: CLINIC | Age: 49
End: 2024-10-22
Payer: COMMERCIAL

## 2024-10-22 VITALS
DIASTOLIC BLOOD PRESSURE: 94 MMHG | BODY MASS INDEX: 35.14 KG/M2 | OXYGEN SATURATION: 96 % | WEIGHT: 198.3 LBS | SYSTOLIC BLOOD PRESSURE: 141 MMHG | HEART RATE: 93 BPM | TEMPERATURE: 99 F | HEIGHT: 63 IN | RESPIRATION RATE: 18 BRPM

## 2024-10-22 DIAGNOSIS — C77.3 MALIGNANT NEOPLASM OF UNSPECIFIED SITE OF LEFT FEMALE BREAST: ICD-10-CM

## 2024-10-22 DIAGNOSIS — C50.912 MALIGNANT NEOPLASM OF UNSPECIFIED SITE OF LEFT FEMALE BREAST: ICD-10-CM

## 2024-10-22 PROCEDURE — 99214 OFFICE O/P EST MOD 30 MIN: CPT

## 2024-11-15 ENCOUNTER — NON-APPOINTMENT (OUTPATIENT)
Age: 49
End: 2024-11-15

## 2024-11-15 ENCOUNTER — APPOINTMENT (OUTPATIENT)
Dept: HEMATOLOGY ONCOLOGY | Facility: CLINIC | Age: 49
End: 2024-11-15

## 2024-11-19 ENCOUNTER — RESULT REVIEW (OUTPATIENT)
Age: 49
End: 2024-11-19

## 2024-11-19 ENCOUNTER — APPOINTMENT (OUTPATIENT)
Dept: HEMATOLOGY ONCOLOGY | Facility: CLINIC | Age: 49
End: 2024-11-19

## 2024-11-19 VITALS
RESPIRATION RATE: 18 BRPM | OXYGEN SATURATION: 97 % | BODY MASS INDEX: 36.73 KG/M2 | HEIGHT: 62 IN | TEMPERATURE: 98.9 F | SYSTOLIC BLOOD PRESSURE: 145 MMHG | HEART RATE: 99 BPM | DIASTOLIC BLOOD PRESSURE: 100 MMHG | WEIGHT: 199.6 LBS

## 2024-11-20 LAB
25(OH)D3 SERPL-MCNC: 45.3 NG/ML
FERRITIN SERPL-MCNC: 55 NG/ML
FOLATE SERPL-MCNC: >20 NG/ML
IRON SATN MFR SERPL: 51 %
IRON SERPL-MCNC: 138 UG/DL
TIBC SERPL-MCNC: 271 UG/DL
UIBC SERPL-MCNC: 133 UG/DL
VIT B12 SERPL-MCNC: 938 PG/ML

## 2024-12-17 ENCOUNTER — LABORATORY RESULT (OUTPATIENT)
Age: 49
End: 2024-12-17

## 2024-12-17 ENCOUNTER — RESULT REVIEW (OUTPATIENT)
Age: 49
End: 2024-12-17

## 2024-12-17 ENCOUNTER — APPOINTMENT (OUTPATIENT)
Dept: HEMATOLOGY ONCOLOGY | Facility: CLINIC | Age: 49
End: 2024-12-17

## 2025-01-14 ENCOUNTER — RESULT REVIEW (OUTPATIENT)
Age: 50
End: 2025-01-14

## 2025-01-14 ENCOUNTER — APPOINTMENT (OUTPATIENT)
Dept: HEMATOLOGY ONCOLOGY | Facility: CLINIC | Age: 50
End: 2025-01-14
Payer: COMMERCIAL

## 2025-01-14 VITALS
HEIGHT: 62 IN | WEIGHT: 198 LBS | SYSTOLIC BLOOD PRESSURE: 139 MMHG | BODY MASS INDEX: 36.44 KG/M2 | OXYGEN SATURATION: 96 % | TEMPERATURE: 97.4 F | RESPIRATION RATE: 17 BRPM | DIASTOLIC BLOOD PRESSURE: 94 MMHG | HEART RATE: 94 BPM

## 2025-01-14 DIAGNOSIS — C77.3 MALIGNANT NEOPLASM OF UNSPECIFIED SITE OF LEFT FEMALE BREAST: ICD-10-CM

## 2025-01-14 DIAGNOSIS — C50.912 MALIGNANT NEOPLASM OF UNSPECIFIED SITE OF LEFT FEMALE BREAST: ICD-10-CM

## 2025-01-14 PROCEDURE — 99204 OFFICE O/P NEW MOD 45 MIN: CPT

## 2025-01-14 PROCEDURE — 99214 OFFICE O/P EST MOD 30 MIN: CPT

## 2025-01-15 LAB
FERRITIN SERPL-MCNC: 66 NG/ML
FOLATE SERPL-MCNC: >20 NG/ML
IRON SATN MFR SERPL: 39 %
IRON SERPL-MCNC: 109 UG/DL
TIBC SERPL-MCNC: 278 UG/DL
UIBC SERPL-MCNC: 168 UG/DL
VIT B12 SERPL-MCNC: 1139 PG/ML

## 2025-02-11 ENCOUNTER — RESULT REVIEW (OUTPATIENT)
Age: 50
End: 2025-02-11

## 2025-02-11 ENCOUNTER — APPOINTMENT (OUTPATIENT)
Dept: HEMATOLOGY ONCOLOGY | Facility: CLINIC | Age: 50
End: 2025-02-11

## 2025-02-11 VITALS
HEART RATE: 96 BPM | HEIGHT: 62 IN | SYSTOLIC BLOOD PRESSURE: 138 MMHG | TEMPERATURE: 97.8 F | RESPIRATION RATE: 16 BRPM | OXYGEN SATURATION: 97 % | DIASTOLIC BLOOD PRESSURE: 95 MMHG | WEIGHT: 202.2 LBS | BODY MASS INDEX: 37.21 KG/M2

## 2025-03-03 ENCOUNTER — NON-APPOINTMENT (OUTPATIENT)
Age: 50
End: 2025-03-03

## 2025-03-11 ENCOUNTER — APPOINTMENT (OUTPATIENT)
Dept: HEMATOLOGY ONCOLOGY | Facility: CLINIC | Age: 50
End: 2025-03-11
Payer: COMMERCIAL

## 2025-03-18 ENCOUNTER — RESULT REVIEW (OUTPATIENT)
Age: 50
End: 2025-03-18

## 2025-03-18 ENCOUNTER — APPOINTMENT (OUTPATIENT)
Dept: HEMATOLOGY ONCOLOGY | Facility: CLINIC | Age: 50
End: 2025-03-18
Payer: COMMERCIAL

## 2025-03-18 VITALS
DIASTOLIC BLOOD PRESSURE: 88 MMHG | WEIGHT: 202 LBS | HEART RATE: 77 BPM | TEMPERATURE: 98.1 F | OXYGEN SATURATION: 98 % | SYSTOLIC BLOOD PRESSURE: 131 MMHG | RESPIRATION RATE: 16 BRPM | HEIGHT: 62 IN | BODY MASS INDEX: 37.17 KG/M2

## 2025-03-18 DIAGNOSIS — C50.912 MALIGNANT NEOPLASM OF UNSPECIFIED SITE OF LEFT FEMALE BREAST: ICD-10-CM

## 2025-03-18 DIAGNOSIS — C77.3 MALIGNANT NEOPLASM OF UNSPECIFIED SITE OF LEFT FEMALE BREAST: ICD-10-CM

## 2025-03-18 PROCEDURE — 99214 OFFICE O/P EST MOD 30 MIN: CPT

## 2025-03-31 ENCOUNTER — NON-APPOINTMENT (OUTPATIENT)
Age: 50
End: 2025-03-31

## 2025-04-15 ENCOUNTER — RESULT REVIEW (OUTPATIENT)
Age: 50
End: 2025-04-15

## 2025-04-15 ENCOUNTER — APPOINTMENT (OUTPATIENT)
Dept: HEMATOLOGY ONCOLOGY | Facility: CLINIC | Age: 50
End: 2025-04-15
Payer: COMMERCIAL

## 2025-04-15 VITALS
DIASTOLIC BLOOD PRESSURE: 89 MMHG | SYSTOLIC BLOOD PRESSURE: 146 MMHG | OXYGEN SATURATION: 96 % | HEART RATE: 82 BPM | RESPIRATION RATE: 16 BRPM | WEIGHT: 204 LBS | TEMPERATURE: 98.5 F | BODY MASS INDEX: 37.54 KG/M2 | HEIGHT: 62 IN

## 2025-04-15 DIAGNOSIS — C77.3 MALIGNANT NEOPLASM OF UNSPECIFIED SITE OF LEFT FEMALE BREAST: ICD-10-CM

## 2025-04-15 DIAGNOSIS — C50.912 MALIGNANT NEOPLASM OF UNSPECIFIED SITE OF LEFT FEMALE BREAST: ICD-10-CM

## 2025-04-15 PROCEDURE — 99214 OFFICE O/P EST MOD 30 MIN: CPT

## 2025-04-15 PROCEDURE — G2211 COMPLEX E/M VISIT ADD ON: CPT | Mod: NC

## 2025-05-13 ENCOUNTER — APPOINTMENT (OUTPATIENT)
Dept: HEMATOLOGY ONCOLOGY | Facility: CLINIC | Age: 50
End: 2025-05-13

## 2025-05-13 ENCOUNTER — RESULT REVIEW (OUTPATIENT)
Age: 50
End: 2025-05-13

## 2025-05-14 ENCOUNTER — NON-APPOINTMENT (OUTPATIENT)
Age: 50
End: 2025-05-14

## 2025-05-14 ENCOUNTER — APPOINTMENT (OUTPATIENT)
Dept: BREAST CENTER | Facility: CLINIC | Age: 50
End: 2025-05-14
Payer: COMMERCIAL

## 2025-05-14 VITALS
WEIGHT: 204 LBS | HEIGHT: 62 IN | DIASTOLIC BLOOD PRESSURE: 89 MMHG | OXYGEN SATURATION: 97 % | SYSTOLIC BLOOD PRESSURE: 136 MMHG | HEART RATE: 110 BPM | BODY MASS INDEX: 37.54 KG/M2

## 2025-05-14 DIAGNOSIS — Z85.3 PERSONAL HISTORY OF MALIGNANT NEOPLASM OF BREAST: ICD-10-CM

## 2025-05-14 DIAGNOSIS — Z90.13 ACQUIRED ABSENCE OF BILATERAL BREASTS AND NIPPLES: ICD-10-CM

## 2025-05-14 DIAGNOSIS — Z80.3 FAMILY HISTORY OF MALIGNANT NEOPLASM OF BREAST: ICD-10-CM

## 2025-05-14 DIAGNOSIS — Z12.39 ENCOUNTER FOR OTHER SCREENING FOR MALIGNANT NEOPLASM OF BREAST: ICD-10-CM

## 2025-05-14 DIAGNOSIS — I89.0 LYMPHEDEMA, NOT ELSEWHERE CLASSIFIED: ICD-10-CM

## 2025-05-14 PROCEDURE — 99213 OFFICE O/P EST LOW 20 MIN: CPT

## 2025-06-10 ENCOUNTER — RESULT REVIEW (OUTPATIENT)
Age: 50
End: 2025-06-10

## 2025-06-10 ENCOUNTER — APPOINTMENT (OUTPATIENT)
Dept: HEMATOLOGY ONCOLOGY | Facility: CLINIC | Age: 50
End: 2025-06-10
Payer: COMMERCIAL

## 2025-06-10 VITALS
HEART RATE: 96 BPM | TEMPERATURE: 98 F | HEIGHT: 62 IN | DIASTOLIC BLOOD PRESSURE: 89 MMHG | BODY MASS INDEX: 38.09 KG/M2 | RESPIRATION RATE: 17 BRPM | SYSTOLIC BLOOD PRESSURE: 135 MMHG | WEIGHT: 207 LBS | OXYGEN SATURATION: 98 %
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
## 2025-06-10 PROCEDURE — 99214 OFFICE O/P EST MOD 30 MIN: CPT

## 2025-07-08 ENCOUNTER — RESULT REVIEW (OUTPATIENT)
Age: 50
End: 2025-07-08

## 2025-07-08 ENCOUNTER — APPOINTMENT (OUTPATIENT)
Dept: HEMATOLOGY ONCOLOGY | Facility: CLINIC | Age: 50
End: 2025-07-08
Payer: COMMERCIAL

## 2025-07-08 VITALS
RESPIRATION RATE: 17 BRPM | BODY MASS INDEX: 37.36 KG/M2 | HEIGHT: 62 IN | DIASTOLIC BLOOD PRESSURE: 99 MMHG | TEMPERATURE: 97.7 F | WEIGHT: 203 LBS | OXYGEN SATURATION: 98 % | HEART RATE: 98 BPM | SYSTOLIC BLOOD PRESSURE: 141 MMHG

## 2025-07-08 PROCEDURE — G2211 COMPLEX E/M VISIT ADD ON: CPT | Mod: NC

## 2025-07-08 PROCEDURE — 99214 OFFICE O/P EST MOD 30 MIN: CPT

## 2025-07-08 RX ORDER — DOXYCYCLINE HYCLATE 50 MG/1
CAPSULE ORAL
Refills: 0 | Status: ACTIVE | COMMUNITY

## 2025-08-05 ENCOUNTER — RESULT REVIEW (OUTPATIENT)
Age: 50
End: 2025-08-05

## 2025-08-05 ENCOUNTER — APPOINTMENT (OUTPATIENT)
Dept: HEMATOLOGY ONCOLOGY | Facility: CLINIC | Age: 50
End: 2025-08-05

## 2025-08-05 VITALS
BODY MASS INDEX: 38.09 KG/M2 | HEART RATE: 87 BPM | DIASTOLIC BLOOD PRESSURE: 90 MMHG | HEIGHT: 62 IN | TEMPERATURE: 97.6 F | WEIGHT: 207 LBS | OXYGEN SATURATION: 98 % | RESPIRATION RATE: 16 BRPM | SYSTOLIC BLOOD PRESSURE: 142 MMHG

## 2025-08-05 DIAGNOSIS — C77.3 MALIGNANT NEOPLASM OF UNSPECIFIED SITE OF LEFT FEMALE BREAST: ICD-10-CM

## 2025-08-05 DIAGNOSIS — C50.912 MALIGNANT NEOPLASM OF UNSPECIFIED SITE OF LEFT FEMALE BREAST: ICD-10-CM

## 2025-08-05 DIAGNOSIS — Z90.13 ACQUIRED ABSENCE OF BILATERAL BREASTS AND NIPPLES: ICD-10-CM

## 2025-08-05 DIAGNOSIS — R39.9 UNSPECIFIED SYMPTOMS AND SIGNS INVOLVING THE GENITOURINARY SYSTEM: ICD-10-CM

## 2025-08-05 DIAGNOSIS — M85.80 OTHER SPECIFIED DISORDERS OF BONE DENSITY AND STRUCTURE, UNSPECIFIED SITE: ICD-10-CM

## 2025-08-05 DIAGNOSIS — Z80.3 FAMILY HISTORY OF MALIGNANT NEOPLASM OF BREAST: ICD-10-CM

## 2025-08-05 PROCEDURE — 99214 OFFICE O/P EST MOD 30 MIN: CPT

## 2025-08-05 RX ORDER — NITROFURANTOIN (MONOHYDRATE/MACROCRYSTALS) 25; 75 MG/1; MG/1
100 CAPSULE ORAL
Qty: 14 | Refills: 0 | Status: ACTIVE | COMMUNITY
Start: 2025-08-05 | End: 1900-01-01

## 2025-08-05 RX ORDER — EFINACONAZOLE 100 MG/ML
SOLUTION TOPICAL
Refills: 0 | Status: ACTIVE | COMMUNITY

## 2025-09-09 ENCOUNTER — APPOINTMENT (OUTPATIENT)
Dept: HEMATOLOGY ONCOLOGY | Facility: CLINIC | Age: 50
End: 2025-09-09
Payer: COMMERCIAL

## 2025-09-09 ENCOUNTER — RESULT REVIEW (OUTPATIENT)
Age: 50
End: 2025-09-09

## 2025-09-09 VITALS
DIASTOLIC BLOOD PRESSURE: 91 MMHG | HEIGHT: 62 IN | TEMPERATURE: 97.4 F | HEART RATE: 80 BPM | SYSTOLIC BLOOD PRESSURE: 128 MMHG | OXYGEN SATURATION: 99 % | RESPIRATION RATE: 17 BRPM | WEIGHT: 208 LBS | BODY MASS INDEX: 38.28 KG/M2

## 2025-09-09 DIAGNOSIS — C50.912 MALIGNANT NEOPLASM OF UNSPECIFIED SITE OF LEFT FEMALE BREAST: ICD-10-CM

## 2025-09-09 DIAGNOSIS — C77.3 MALIGNANT NEOPLASM OF UNSPECIFIED SITE OF LEFT FEMALE BREAST: ICD-10-CM

## 2025-09-09 PROCEDURE — 99214 OFFICE O/P EST MOD 30 MIN: CPT

## 2025-09-16 ENCOUNTER — RESULT REVIEW (OUTPATIENT)
Age: 50
End: 2025-09-16